# Patient Record
Sex: MALE | Race: WHITE | NOT HISPANIC OR LATINO | Employment: FULL TIME | URBAN - METROPOLITAN AREA
[De-identification: names, ages, dates, MRNs, and addresses within clinical notes are randomized per-mention and may not be internally consistent; named-entity substitution may affect disease eponyms.]

---

## 2017-11-13 ENCOUNTER — OFFICE VISIT (OUTPATIENT)
Dept: FAMILY MEDICINE | Facility: CLINIC | Age: 32
End: 2017-11-13
Payer: COMMERCIAL

## 2017-11-13 VITALS
BODY MASS INDEX: 28.65 KG/M2 | DIASTOLIC BLOOD PRESSURE: 80 MMHG | HEIGHT: 72 IN | TEMPERATURE: 99 F | RESPIRATION RATE: 16 BRPM | HEART RATE: 80 BPM | SYSTOLIC BLOOD PRESSURE: 112 MMHG | WEIGHT: 211.5 LBS

## 2017-11-13 DIAGNOSIS — E78.2 MIXED HYPERLIPIDEMIA: ICD-10-CM

## 2017-11-13 PROCEDURE — 99395 PREV VISIT EST AGE 18-39: CPT | Mod: ,,, | Performed by: FAMILY MEDICINE

## 2017-11-13 NOTE — PROGRESS NOTES
Subjective:       Patient ID: Iván Amaral is a 32 y.o. male.    Chief Complaint: Follow-up (labs done at work triglycerides high)    Had wellness screening at work, found to have high triglycerides- 519.  Does eat a lot of sugar. No fx diabetes.    tlcp=972, ldl=92, hdl=27  Review of Systems   Constitutional: Negative for activity change, appetite change, chills, diaphoresis, fatigue, fever and unexpected weight change.   HENT: Negative for congestion, dental problem, drooling, ear discharge, ear pain, facial swelling, hearing loss, mouth sores, nosebleeds, postnasal drip, rhinorrhea, sinus pain, sinus pressure, sneezing, sore throat, tinnitus, trouble swallowing and voice change.    Eyes: Negative for photophobia, pain, discharge, redness, itching and visual disturbance.   Respiratory: Negative for apnea, cough, choking, chest tightness, shortness of breath, wheezing and stridor.    Cardiovascular: Negative for chest pain, palpitations and leg swelling.   Gastrointestinal: Negative for abdominal distention, abdominal pain, anal bleeding, blood in stool, constipation, diarrhea, nausea, rectal pain and vomiting.   Endocrine: Negative for cold intolerance, heat intolerance, polydipsia, polyphagia and polyuria.   Genitourinary: Negative for decreased urine volume, difficulty urinating, discharge, dysuria, enuresis, flank pain, frequency, genital sores, hematuria, penile pain, penile swelling, scrotal swelling, testicular pain and urgency.   Musculoskeletal: Negative for arthralgias, back pain, gait problem, joint swelling, myalgias, neck pain and neck stiffness.   Skin: Negative for color change, pallor, rash and wound.   Allergic/Immunologic: Negative for environmental allergies, food allergies and immunocompromised state.   Neurological: Positive for headaches (rare.). Negative for dizziness, tremors, seizures, syncope, facial asymmetry, speech difficulty, weakness, light-headedness and numbness.    Hematological: Negative for adenopathy. Does not bruise/bleed easily.   Psychiatric/Behavioral: Negative for agitation, behavioral problems, confusion, decreased concentration, dysphoric mood, hallucinations, self-injury, sleep disturbance and suicidal ideas. The patient is not nervous/anxious and is not hyperactive.        Objective:      Physical Exam   Constitutional: He appears well-developed and well-nourished.   HENT:   Head: Normocephalic and atraumatic.   Eyes: Conjunctivae and EOM are normal. Pupils are equal, round, and reactive to light.   Neck: Normal range of motion. Neck supple.   Cardiovascular: Normal rate, regular rhythm, normal heart sounds and intact distal pulses.    Abdominal: Soft. Bowel sounds are normal. He exhibits no distension and no mass. There is no tenderness. There is no rebound and no guarding. No hernia.       Assessment:       1. Mixed hyperlipidemia        Plan:       Iván was seen today for follow-up.    Diagnoses and all orders for this visit:    Mixed hyperlipidemia  -     Hepatic function panel; Future  -     Lipid panel; Future  -     Hemoglobin A1c; Future  -     Lipase; Future  -     Hepatic function panel  -     Lipid panel  -     Hemoglobin A1c  -     Lipase         Return in about 6 months (around 5/13/2018).

## 2017-11-14 ENCOUNTER — TELEPHONE (OUTPATIENT)
Dept: FAMILY MEDICINE | Facility: CLINIC | Age: 32
End: 2017-11-14

## 2017-11-15 ENCOUNTER — TELEPHONE (OUTPATIENT)
Dept: FAMILY MEDICINE | Facility: CLINIC | Age: 32
End: 2017-11-15

## 2017-11-15 LAB
ALBUMIN SERPL-MCNC: 4.5 G/DL (ref 3.5–5.5)
ALP SERPL-CCNC: 127 IU/L (ref 39–117)
ALT SERPL-CCNC: 47 IU/L (ref 0–32)
AST SERPL-CCNC: 24 IU/L (ref 0–40)
BILIRUB DIRECT SERPL-MCNC: 0.09 MG/DL (ref 0–0.4)
BILIRUB SERPL-MCNC: 0.4 MG/DL (ref 0–1.2)
CHOLEST SERPL-MCNC: 208 MG/DL (ref 100–199)
HBA1C MFR BLD: 5.1 % (ref 4.8–5.6)
HDLC SERPL-MCNC: 35 MG/DL
LDLC SERPL CALC-MCNC: 126 MG/DL (ref 0–99)
LIPASE SERPL-CCNC: 25 U/L (ref 14–72)
PROT SERPL-MCNC: 7.1 G/DL (ref 6–8.5)
TRIGL SERPL-MCNC: 234 MG/DL (ref 0–149)
VLDLC SERPL CALC-MCNC: 47 MG/DL (ref 5–40)

## 2017-11-15 NOTE — TELEPHONE ENCOUNTER
----- Message from Hugo Moran MD sent at 11/15/2017  8:49 AM CST -----  Please notify patient:Cholesterol is high at 208 with high LDL is well. Considering family history and risk factors would recommend starting Pravachol 10 mg per day and recheckin 3 months. If this is okay with you at me know and I will send in to your pharmacy.  ----- Message -----  From: Dorina Anderson  Sent: 11/15/2017   8:18 AM  To: [facility] Staff    Lab 11/14/17

## 2017-12-06 ENCOUNTER — TELEPHONE (OUTPATIENT)
Dept: FAMILY MEDICINE | Facility: CLINIC | Age: 32
End: 2017-12-06

## 2017-12-06 NOTE — TELEPHONE ENCOUNTER
----- Message from Hugo Moran MD sent at 12/6/2017  4:57 PM CST -----  High cholesterol and HDL. Low-cholesterol diet and recheck in 6 weeks

## 2017-12-07 ENCOUNTER — TELEPHONE (OUTPATIENT)
Dept: FAMILY MEDICINE | Facility: CLINIC | Age: 32
End: 2017-12-07

## 2017-12-07 NOTE — TELEPHONE ENCOUNTER
Patient called in describing some muscle aches that started after he started Pravachol 10 mg per day he described in the right upper arm but is also having mild pain in both legs. He was advised this could be mild myalgias and notatrial myopathyfrom the medicine but was advised to come Q10 twice a day for 1 week and then once a day thereafter and to notify me if it doesn't improve over 2 weeks.

## 2018-01-02 ENCOUNTER — OFFICE VISIT (OUTPATIENT)
Dept: FAMILY MEDICINE | Facility: CLINIC | Age: 33
End: 2018-01-02
Payer: COMMERCIAL

## 2018-01-02 VITALS
SYSTOLIC BLOOD PRESSURE: 112 MMHG | DIASTOLIC BLOOD PRESSURE: 80 MMHG | HEIGHT: 72 IN | WEIGHT: 213 LBS | BODY MASS INDEX: 28.85 KG/M2 | HEART RATE: 76 BPM | TEMPERATURE: 98 F

## 2018-01-02 DIAGNOSIS — R10.11 RIGHT UPPER QUADRANT ABDOMINAL PAIN: ICD-10-CM

## 2018-01-02 PROCEDURE — 99213 OFFICE O/P EST LOW 20 MIN: CPT | Mod: ,,, | Performed by: FAMILY MEDICINE

## 2018-01-02 RX ORDER — UBIDECARENONE 30 MG
30 CAPSULE ORAL 3 TIMES DAILY
COMMUNITY
End: 2018-01-25

## 2018-01-02 RX ORDER — NAPROXEN 500 MG/1
TABLET ORAL
COMMUNITY
Start: 2017-12-27 | End: 2018-01-25

## 2018-01-02 RX ORDER — ONDANSETRON 4 MG/1
TABLET, FILM COATED ORAL
COMMUNITY
Start: 2017-12-11 | End: 2018-01-02

## 2018-01-02 RX ORDER — PRAVASTATIN SODIUM 10 MG/1
TABLET ORAL
COMMUNITY
Start: 2017-12-18 | End: 2018-01-02

## 2018-01-02 RX ORDER — MULTIVITAMIN
1 TABLET ORAL DAILY
COMMUNITY

## 2018-01-02 RX ORDER — OMEPRAZOLE 40 MG/1
40 CAPSULE, DELAYED RELEASE ORAL DAILY
Qty: 90 CAPSULE | Refills: 3 | Status: SHIPPED | OUTPATIENT
Start: 2018-01-02 | End: 2018-03-02 | Stop reason: SDUPTHER

## 2018-01-02 RX ORDER — HYDROCODONE BITARTRATE AND ACETAMINOPHEN 5; 325 MG/1; MG/1
1 TABLET ORAL EVERY 6 HOURS PRN
Qty: 40 TABLET | Refills: 0 | Status: SHIPPED | OUTPATIENT
Start: 2018-01-02 | End: 2018-01-16

## 2018-01-02 NOTE — PROGRESS NOTES
Subjective:       Patient ID: Iván Amaral is a 32 y.o. male.    Chief Complaint: Abdominal Pain (RUQ ) and Nausea (VOMITING OCCASIONALLY)      Suspected gallstones: Abdominal pain bloating and nausea after eating. Especially fatty foods , pizza .  Seen at Urgent care for right chest pain, given Naproxen. Has pain into the right scapula when he and shoulder  Wt Readings from Last 3 Encounters:  01/02/18 : 96.6 kg (213 lb)  11/13/17 : 95.9 kg (211 lb 8 oz)    Temp Readings from Last 3 Encounters:  01/02/18 : 97.7 °F (36.5 °C) (Tympanic)  11/13/17 : 98.5 °F (36.9 °C) (Tympanic)    BP Readings from Last 3 Encounters:  01/02/18 : 112/80  11/13/17 : 112/80    Pulse Readings from Last 3 Encounters:  01/02/18 : 76  11/13/17 : 80        Abdominal Pain   This is a new problem. The current episode started 1 to 4 weeks ago. The onset quality is gradual. The problem occurs intermittently. The problem has been gradually worsening. The pain is located in the RUQ and epigastric region. The pain is at a severity of 5/10. The pain is moderate. The quality of the pain is aching and dull. Associated symptoms include belching and diarrhea. Pertinent negatives include no hematochezia, hematuria or melena. The pain is aggravated by eating ( gaviscon). He has tried antacids for the symptoms.       Allergies and Medications:   Review of patient's allergies indicates:   Allergen Reactions    Pcn [penicillins]      Current Outpatient Prescriptions   Medication Sig Dispense Refill    co-enzyme Q-10 30 mg capsule Take 30 mg by mouth 3 (three) times daily.      multivitamin (ONE DAILY MULTIVITAMIN) per tablet Take 1 tablet by mouth once daily.      naproxen (NAPROSYN) 500 MG tablet       omeprazole (PRILOSEC) 40 MG capsule Take 1 capsule (40 mg total) by mouth once daily. 90 capsule 3     No current facility-administered medications for this visit.        Family History:   Family History   Problem Relation Age of Onset     Diabetes Mother     Hypothyroidism Mother     Diabetes Father     Gout Father     Heart failure Father     Heart disease Father     Hypoglycemic Brother     Cancer Maternal Grandfather        Social History:   Social History     Social History    Marital status: Single     Spouse name: N/A    Number of children: N/A    Years of education: N/A     Occupational History    Not on file.     Social History Main Topics    Smoking status: Never Smoker    Smokeless tobacco: Never Used    Alcohol use No    Drug use: No    Sexual activity: Not on file     Other Topics Concern    Not on file     Social History Narrative    No narrative on file       Review of Systems   Gastrointestinal: Positive for diarrhea. Negative for hematochezia and melena.   Genitourinary: Negative for hematuria.       Objective:     Vitals:    01/02/18 0924   BP: 112/80   Pulse: 76   Temp: 97.7 °F (36.5 °C)        Physical Exam   Pulmonary/Chest: Effort normal and breath sounds normal. No respiratory distress. He has no wheezes. He has no rales. He exhibits no tenderness.   Abdominal: Soft. Bowel sounds are normal. He exhibits distension. There is tenderness (tenderness right upper quadrant with posi).       Assessment:       1. Right upper quadrant abdominal pain        Plan:       Iván was seen today for abdominal pain and nausea.    Diagnoses and all orders for this visit:    Right upper quadrant abdominal pain  -     CBC auto differential; Future  -     Comprehensive metabolic panel; Future  -     Lipase; Future  -     US Abdomen Complete; Future  -     CBC auto differential  -     Comprehensive metabolic panel  -     Lipase    Other orders  -     omeprazole (PRILOSEC) 40 MG capsule; Take 1 capsule (40 mg total) by mouth once daily.         Return if symptoms worsen or fail to improve.

## 2018-01-03 ENCOUNTER — TELEPHONE (OUTPATIENT)
Dept: FAMILY MEDICINE | Facility: CLINIC | Age: 33
End: 2018-01-03

## 2018-01-03 LAB
ALBUMIN SERPL-MCNC: 4.5 G/DL (ref 3.5–5.5)
ALBUMIN/GLOB SERPL: 1.4 {RATIO} (ref 1.2–2.2)
ALP SERPL-CCNC: 135 IU/L (ref 39–117)
ALT SERPL-CCNC: 43 IU/L (ref 0–44)
AMBIG ABBREV CMP 14 DEFAULT: NORMAL
AST SERPL-CCNC: 21 IU/L (ref 0–40)
BASOPHILS # BLD AUTO: 0 X10E3/UL (ref 0–0.2)
BASOPHILS NFR BLD AUTO: 1 %
BILIRUB SERPL-MCNC: 0.3 MG/DL (ref 0–1.2)
BUN SERPL-MCNC: 16 MG/DL (ref 6–20)
BUN/CREAT SERPL: 15 (ref 9–20)
CALCIUM SERPL-MCNC: 10 MG/DL (ref 8.7–10.2)
CHLORIDE SERPL-SCNC: 100 MMOL/L (ref 96–106)
CO2 SERPL-SCNC: 24 MMOL/L (ref 18–29)
CREAT SERPL-MCNC: 1.06 MG/DL (ref 0.76–1.27)
EGFR IF AFRICAN AMERICAN: 106 ML/MIN/1.73
EOSINOPHIL # BLD AUTO: 0.4 X10E3/UL (ref 0–0.4)
EOSINOPHIL NFR BLD AUTO: 6 %
ERYTHROCYTE [DISTWIDTH] IN BLOOD BY AUTOMATED COUNT: 13.4 % (ref 12.3–15.4)
EST. GFR  (NON AFRICAN AMERICAN): 92 ML/MIN/1.73
GLOBULIN SER CALC-MCNC: 3.2 G/DL (ref 1.5–4.5)
GLUCOSE SERPL-MCNC: 105 MG/DL (ref 65–99)
HCT VFR BLD AUTO: 46.9 % (ref 37.5–51)
HGB BLD-MCNC: 16.6 G/DL (ref 13–17.7)
IMM GRANULOCYTES # BLD: 0 X10E3/UL (ref 0–0.1)
IMM GRANULOCYTES NFR BLD: 0 %
LIPASE SERPL-CCNC: 27 U/L (ref 13–78)
LYMPHOCYTES # BLD AUTO: 2.4 X10E3/UL (ref 0.7–3.1)
LYMPHOCYTES NFR BLD AUTO: 36 %
MCH RBC QN AUTO: 30.7 PG (ref 26.6–33)
MCHC RBC AUTO-ENTMCNC: 35.4 G/DL (ref 31.5–35.7)
MCV RBC AUTO: 87 FL (ref 79–97)
MONOCYTES # BLD AUTO: 0.5 X10E3/UL (ref 0.1–0.9)
MONOCYTES NFR BLD AUTO: 8 %
NEUTROPHILS # BLD AUTO: 3.4 X10E3/UL (ref 1.4–7)
NEUTROPHILS NFR BLD AUTO: 49 %
PLATELET # BLD AUTO: 368 X10E3/UL (ref 150–379)
POTASSIUM SERPL-SCNC: 4.8 MMOL/L (ref 3.5–5.2)
PROT SERPL-MCNC: 7.7 G/DL (ref 6–8.5)
RBC # BLD AUTO: 5.41 X10E6/UL (ref 4.14–5.8)
SODIUM SERPL-SCNC: 141 MMOL/L (ref 134–144)
WBC # BLD AUTO: 6.7 X10E3/UL (ref 3.4–10.8)

## 2018-01-03 NOTE — TELEPHONE ENCOUNTER
Returns patient call left a message on his antrum stain with extensive instructions. Also advised him he has no evidence of gallstones.

## 2018-01-03 NOTE — TELEPHONE ENCOUNTER
Advised patient of normal results of ultrasound Dr. Moran awaiting lab results continue Omeprazole

## 2018-01-03 NOTE — TELEPHONE ENCOUNTER
----- Message from Hugo Moran MD sent at 1/2/2018 12:42 PM CST -----  No evidence of gallstones. Probable duodenal this versus duodenal ulcer. Will await lab work and treat with the acid blocker as previously prescribed.

## 2018-01-03 NOTE — TELEPHONE ENCOUNTER
----- Message from Francesca Gallagher sent at 1/3/2018  3:10 PM CST -----  NEEDS TO KNOW IF HIS RESULTS TO HIS ULTRA SOUND CAME IN YET,PLEASE CALL

## 2018-01-11 ENCOUNTER — TELEPHONE (OUTPATIENT)
Dept: FAMILY MEDICINE | Facility: CLINIC | Age: 33
End: 2018-01-11

## 2018-01-15 ENCOUNTER — TELEPHONE (OUTPATIENT)
Dept: FAMILY MEDICINE | Facility: CLINIC | Age: 33
End: 2018-01-15

## 2018-01-15 NOTE — TELEPHONE ENCOUNTER
Patient called in regards to Pravastatin. You had stopped medication at his last visit pending lab results.  Should patient restart the Pravastatin.  His stomach issues are improving with Omeprazole but he is still having chest pressure Please advise .  [phone]

## 2018-01-15 NOTE — TELEPHONE ENCOUNTER
Patient notified to restart cholesterol medication and to rtc if the chest pressure continues RTC or to ER if shortness of breath

## 2018-01-24 ENCOUNTER — TELEPHONE (OUTPATIENT)
Dept: FAMILY MEDICINE | Facility: CLINIC | Age: 33
End: 2018-01-24

## 2018-01-24 NOTE — TELEPHONE ENCOUNTER
Patient had an appointment for Friday that had to be rescheduled series having chest pain because he has a father with congestive heart failure he's concerned about a cardiac problem is 32 years old and has no cardiac history personally does have a history of ulcers with reflux he is taking simethicone without relief in his omeprazole on a daily basis. We will try given appointment for an EKG and evaluation.

## 2018-01-24 NOTE — TELEPHONE ENCOUNTER
----- Message from Olivia Worthington sent at 2018  9:58 AM CST -----  Contact: We moved pt's appt from Friday due to  to tomorrow, but....see below  Pt asking to speak to Dr Moran today, says he's having pressure in his chest.

## 2018-01-25 ENCOUNTER — OFFICE VISIT (OUTPATIENT)
Dept: FAMILY MEDICINE | Facility: CLINIC | Age: 33
End: 2018-01-25
Payer: COMMERCIAL

## 2018-01-25 VITALS
HEART RATE: 76 BPM | TEMPERATURE: 98 F | SYSTOLIC BLOOD PRESSURE: 112 MMHG | RESPIRATION RATE: 16 BRPM | WEIGHT: 215 LBS | DIASTOLIC BLOOD PRESSURE: 70 MMHG | BODY MASS INDEX: 29.12 KG/M2 | HEIGHT: 72 IN

## 2018-01-25 DIAGNOSIS — R07.9 CHEST PAIN, UNSPECIFIED TYPE: Primary | ICD-10-CM

## 2018-01-25 DIAGNOSIS — R07.9 CHEST PAIN AT REST: ICD-10-CM

## 2018-01-25 DIAGNOSIS — F41.9 ANXIETY: ICD-10-CM

## 2018-01-25 PROCEDURE — 93000 ELECTROCARDIOGRAM COMPLETE: CPT | Mod: ,,, | Performed by: FAMILY MEDICINE

## 2018-01-25 PROCEDURE — 99214 OFFICE O/P EST MOD 30 MIN: CPT | Mod: 25,,, | Performed by: FAMILY MEDICINE

## 2018-01-25 NOTE — PATIENT INSTRUCTIONS
Rebreathing for anxiety attack:  Keep a #12 white paper bag in desk bedside, and glove compartment. During an attack hold the bag up to mouth and nose and rebreathe slowly until attack breaks.

## 2018-03-02 RX ORDER — OMEPRAZOLE 40 MG/1
40 CAPSULE, DELAYED RELEASE ORAL DAILY
Qty: 90 CAPSULE | Refills: 3 | Status: SHIPPED | OUTPATIENT
Start: 2018-03-02 | End: 2018-04-04 | Stop reason: SDUPTHER

## 2018-03-02 RX ORDER — PRAVASTATIN SODIUM 10 MG/1
10 TABLET ORAL DAILY
Qty: 90 TABLET | Refills: 3 | Status: SHIPPED | OUTPATIENT
Start: 2018-03-02 | End: 2018-04-04 | Stop reason: SDUPTHER

## 2018-04-04 RX ORDER — OMEPRAZOLE 40 MG/1
40 CAPSULE, DELAYED RELEASE ORAL DAILY
Qty: 90 CAPSULE | Refills: 3 | Status: SHIPPED | OUTPATIENT
Start: 2018-04-04 | End: 2018-05-14 | Stop reason: ALTCHOICE

## 2018-04-04 RX ORDER — PRAVASTATIN SODIUM 10 MG/1
10 TABLET ORAL DAILY
Qty: 90 TABLET | Refills: 3 | Status: SHIPPED | OUTPATIENT
Start: 2018-04-04 | End: 2019-10-23 | Stop reason: SDUPTHER

## 2018-05-14 ENCOUNTER — OFFICE VISIT (OUTPATIENT)
Dept: FAMILY MEDICINE | Facility: CLINIC | Age: 33
End: 2018-05-14
Payer: COMMERCIAL

## 2018-05-14 VITALS
OXYGEN SATURATION: 99 % | DIASTOLIC BLOOD PRESSURE: 82 MMHG | HEART RATE: 78 BPM | WEIGHT: 219 LBS | HEIGHT: 72 IN | RESPIRATION RATE: 16 BRPM | TEMPERATURE: 97 F | BODY MASS INDEX: 29.66 KG/M2 | SYSTOLIC BLOOD PRESSURE: 118 MMHG

## 2018-05-14 DIAGNOSIS — R10.12 LEFT UPPER QUADRANT PAIN: ICD-10-CM

## 2018-05-14 DIAGNOSIS — K25.9 GASTRIC ULCER, UNSPECIFIED CHRONICITY, UNSPECIFIED WHETHER GASTRIC ULCER HEMORRHAGE OR PERFORATION PRESENT: ICD-10-CM

## 2018-05-14 PROCEDURE — 99213 OFFICE O/P EST LOW 20 MIN: CPT | Mod: ,,, | Performed by: FAMILY MEDICINE

## 2018-05-14 PROCEDURE — 3008F BODY MASS INDEX DOCD: CPT | Mod: ,,, | Performed by: FAMILY MEDICINE

## 2018-05-14 RX ORDER — ESOMEPRAZOLE MAGNESIUM 40 MG/1
40 CAPSULE, DELAYED RELEASE ORAL
Qty: 30 CAPSULE | Refills: 6 | Status: SHIPPED | OUTPATIENT
Start: 2018-05-14 | End: 2020-12-07

## 2018-05-14 NOTE — PROGRESS NOTES
Subjective:       Patient ID: Iván Amaral is a 33 y.o. male.    Chief Complaint: Follow-up (with ulcer) and Abdominal Pain      Abdominal Pain   This is a new problem. Episode onset: 6 months. The onset quality is gradual. The problem has been unchanged. The pain is located in the LUQ. The pain is at a severity of 6/10. The pain is moderate. The quality of the pain is dull and aching. The abdominal pain does not radiate. Pertinent negatives include no anorexia, arthralgias, constipation, hematuria, melena, nausea, vomiting or weight loss. Nothing aggravates the pain. The pain is relieved by nothing. He has tried antacids and proton pump inhibitors for the symptoms. Prior workup: None yet. His past medical history is significant for PUD (6 months).       Allergies and Medications:   Review of patient's allergies indicates:   Allergen Reactions    Pcn [penicillins]      Current Outpatient Prescriptions   Medication Sig Dispense Refill    multivitamin (ONE DAILY MULTIVITAMIN) per tablet Take 1 tablet by mouth once daily.      pravastatin (PRAVACHOL) 10 MG tablet Take 1 tablet (10 mg total) by mouth once daily. 90 tablet 3    esomeprazole (NEXIUM) 40 MG capsule Take 1 capsule (40 mg total) by mouth before breakfast. Patient failed a six-month course of omeprazole 30 capsule 6     No current facility-administered medications for this visit.        Family History:   Family History   Problem Relation Age of Onset    Diabetes Mother     Hypothyroidism Mother     Diabetes Father     Gout Father     Heart failure Father     Heart disease Father     Hypoglycemic Brother     Cancer Maternal Grandfather        Social History:   Social History     Social History    Marital status: Single     Spouse name: N/A    Number of children: N/A    Years of education: N/A     Occupational History    Not on file.     Social History Main Topics    Smoking status: Never Smoker    Smokeless tobacco: Never Used     Alcohol use No    Drug use: No    Sexual activity: Not on file     Other Topics Concern    Not on file     Social History Narrative    No narrative on file       Review of Systems   Constitutional: Negative for weight loss.   Gastrointestinal: Positive for abdominal pain. Negative for anorexia, constipation, melena, nausea and vomiting.   Genitourinary: Negative for hematuria.   Musculoskeletal: Negative for arthralgias.       Objective:     Vitals:    05/14/18 0946   BP: 118/82   Pulse: 78   Resp: 16   Temp: 97.3 °F (36.3 °C)        Physical Exam   Abdominal: He exhibits no shifting dullness, no distension, no pulsatile liver, no fluid wave, no abdominal bruit, no ascites, no pulsatile midline mass and no mass. There is no hepatosplenomegaly, splenomegaly or hepatomegaly. There is tenderness in the epigastric area and left upper quadrant. There is no rebound, no guarding, no CVA tenderness, no tenderness at McBurney's point and negative Rankin's sign. No hernia. Hernia confirmed negative in the ventral area, confirmed negative in the right inguinal area and confirmed negative in the left inguinal area.           Assessment:       1. Left upper quadrant pain    2. Gastric ulcer, unspecified chronicity, unspecified whether gastric ulcer hemorrhage or perforation present        Plan:       Iván was seen today for follow-up and abdominal pain.    Diagnoses and all orders for this visit:    Left upper quadrant pain  -     esomeprazole (NEXIUM) 40 MG capsule; Take 1 capsule (40 mg total) by mouth before breakfast. Patient failed a six-month course of omeprazole  -     CBC auto differential; Future  -     Hepatic function panel; Future  -     Lipase; Future  -     Ambulatory referral to Gastroenterology  -     CBC auto differential  -     Hepatic function panel  -     Lipase    Gastric ulcer, unspecified chronicity, unspecified whether gastric ulcer hemorrhage or perforation present         Follow-up if symptoms  worsen or fail to improve.

## 2018-05-15 LAB
ALBUMIN SERPL-MCNC: 4.5 G/DL (ref 3.5–5.5)
ALP SERPL-CCNC: 186 IU/L (ref 39–117)
ALT SERPL-CCNC: 45 IU/L (ref 0–44)
AMBIG ABBREV LP DEFAULT: NORMAL
AST SERPL-CCNC: 21 IU/L (ref 0–40)
BASOPHILS # BLD AUTO: 0 X10E3/UL (ref 0–0.2)
BASOPHILS NFR BLD AUTO: 0 %
BILIRUB DIRECT SERPL-MCNC: 0.06 MG/DL (ref 0–0.4)
BILIRUB SERPL-MCNC: <0.2 MG/DL (ref 0–1.2)
EOSINOPHIL # BLD AUTO: 0.4 X10E3/UL (ref 0–0.4)
EOSINOPHIL NFR BLD AUTO: 4 %
ERYTHROCYTE [DISTWIDTH] IN BLOOD BY AUTOMATED COUNT: 13.8 % (ref 12.3–15.4)
HCT VFR BLD AUTO: 45.4 % (ref 37.5–51)
HGB BLD-MCNC: 15.2 G/DL (ref 13–17.7)
IMM GRANULOCYTES # BLD: 0 X10E3/UL (ref 0–0.1)
IMM GRANULOCYTES NFR BLD: 0 %
LIPASE SERPL-CCNC: 23 U/L (ref 13–78)
LYMPHOCYTES # BLD AUTO: 3 X10E3/UL (ref 0.7–3.1)
LYMPHOCYTES NFR BLD AUTO: 37 %
MCH RBC QN AUTO: 30.1 PG (ref 26.6–33)
MCHC RBC AUTO-ENTMCNC: 33.5 G/DL (ref 31.5–35.7)
MCV RBC AUTO: 90 FL (ref 79–97)
MONOCYTES # BLD AUTO: 0.7 X10E3/UL (ref 0.1–0.9)
MONOCYTES NFR BLD AUTO: 8 %
NEUTROPHILS # BLD AUTO: 4.1 X10E3/UL (ref 1.4–7)
NEUTROPHILS NFR BLD AUTO: 51 %
PROT SERPL-MCNC: 7.2 G/DL (ref 6–8.5)
RBC # BLD AUTO: 5.05 X10E6/UL (ref 4.14–5.8)
WBC # BLD AUTO: 8.2 X10E3/UL (ref 3.4–10.8)

## 2018-05-17 ENCOUNTER — TELEPHONE (OUTPATIENT)
Dept: FAMILY MEDICINE | Facility: CLINIC | Age: 33
End: 2018-05-17

## 2018-05-17 NOTE — TELEPHONE ENCOUNTER
----- Message from Hugo Moran MD sent at 5/16/2018  4:39 PM CDT -----  Results Ok, notify patient.

## 2018-07-26 ENCOUNTER — TELEPHONE (OUTPATIENT)
Dept: FAMILY MEDICINE | Facility: CLINIC | Age: 33
End: 2018-07-26

## 2018-07-26 NOTE — TELEPHONE ENCOUNTER
Patient called in regards to medication for an ulcer he wanted to speak with you about this Please call him

## 2019-01-14 NOTE — PROGRESS NOTES
Subjective:       Patient ID: Iván Amaral is a 32 y.o. male.    Chief Complaint: Bloated and Chest Pain      Has a long history of gastroesophageal reflux and gastritis but has had chest pains develop which has persisted over the last week or so.      Chest Pain    This is a new problem. The current episode started in the past 7 days. The quality of the pain is described as pressure and squeezing. The pain does not radiate. Associated symptoms include orthopnea and shortness of breath. Pertinent negatives include no cough, diaphoresis, dizziness, exertional chest pressure, nausea, near-syncope, palpitations or vomiting. The pain is aggravated by nothing. He has tried antacids for the symptoms. The treatment provided no relief.   His past medical history is significant for anxiety/panic attacks. Past medical history comments: GERD       Allergies and Medications:   Review of patient's allergies indicates:   Allergen Reactions    Pcn [penicillins]      Current Outpatient Prescriptions   Medication Sig Dispense Refill    multivitamin (ONE DAILY MULTIVITAMIN) per tablet Take 1 tablet by mouth once daily.      omeprazole (PRILOSEC) 40 MG capsule Take 1 capsule (40 mg total) by mouth once daily. 90 capsule 3     No current facility-administered medications for this visit.        Family History:   Family History   Problem Relation Age of Onset    Diabetes Mother     Hypothyroidism Mother     Diabetes Father     Gout Father     Heart failure Father     Heart disease Father     Hypoglycemic Brother     Cancer Maternal Grandfather        Social History:   Social History     Social History    Marital status: Single     Spouse name: N/A    Number of children: N/A    Years of education: N/A     Occupational History    Not on file.     Social History Main Topics    Smoking status: Never Smoker    Smokeless tobacco: Never Used    Alcohol use No    Drug use: No    Sexual activity: Not on file     Other  Topics Concern    Not on file     Social History Narrative    No narrative on file       Review of Systems   Constitutional: Negative for diaphoresis.   Respiratory: Positive for chest tightness and shortness of breath. Negative for apnea, cough, choking, wheezing and stridor.    Cardiovascular: Positive for chest pain and orthopnea. Negative for palpitations, leg swelling and near-syncope.   Gastrointestinal: Negative for nausea and vomiting.   Neurological: Negative for dizziness.       Objective:     Vitals:    01/25/18 1001   BP: 112/70   Pulse: 76   Resp: 16   Temp: 97.8 °F (36.6 °C)        Physical Exam   Cardiovascular: Normal rate, regular rhythm, normal heart sounds and intact distal pulses.  Exam reveals no gallop and no friction rub.    No murmur heard.  Pulmonary/Chest: Effort normal and breath sounds normal. No respiratory distress. He has no wheezes. He has no rales. He exhibits no tenderness.     EKG: normal EKG, normal sinus rhythm, unchanged from previous tracings, normal sinus rhythm.    Assessment:       1. Chest pain, unspecified type    2. Anxiety    3. Chest pain at rest        Plan:       Iván was seen today for bloated and chest pain.    Diagnoses and all orders for this visit:    Chest pain, unspecified type  -     EKG 12-lead    Anxiety    Chest pain at rest         Follow-up if symptoms worsen or fail to improve.   95

## 2019-04-03 ENCOUNTER — TELEPHONE (OUTPATIENT)
Dept: FAMILY MEDICINE | Facility: CLINIC | Age: 34
End: 2019-04-03

## 2019-04-17 ENCOUNTER — OFFICE VISIT (OUTPATIENT)
Dept: FAMILY MEDICINE | Facility: CLINIC | Age: 34
End: 2019-04-17
Payer: COMMERCIAL

## 2019-04-17 VITALS
HEIGHT: 72 IN | WEIGHT: 205.13 LBS | OXYGEN SATURATION: 99 % | HEART RATE: 89 BPM | BODY MASS INDEX: 27.79 KG/M2 | SYSTOLIC BLOOD PRESSURE: 128 MMHG | DIASTOLIC BLOOD PRESSURE: 88 MMHG | TEMPERATURE: 98 F

## 2019-04-17 DIAGNOSIS — F41.8 DEPRESSION WITH ANXIETY: ICD-10-CM

## 2019-04-17 DIAGNOSIS — E78.2 MIXED HYPERLIPIDEMIA: Primary | ICD-10-CM

## 2019-04-17 PROCEDURE — 99213 OFFICE O/P EST LOW 20 MIN: CPT | Mod: ,,, | Performed by: FAMILY MEDICINE

## 2019-04-17 PROCEDURE — 99213 PR OFFICE/OUTPT VISIT, EST, LEVL III, 20-29 MIN: ICD-10-PCS | Mod: ,,, | Performed by: FAMILY MEDICINE

## 2019-04-17 PROCEDURE — 3008F BODY MASS INDEX DOCD: CPT | Mod: ,,, | Performed by: FAMILY MEDICINE

## 2019-04-17 PROCEDURE — 3008F PR BODY MASS INDEX (BMI) DOCUMENTED: ICD-10-PCS | Mod: ,,, | Performed by: FAMILY MEDICINE

## 2019-04-17 RX ORDER — CITALOPRAM 20 MG/1
20 TABLET, FILM COATED ORAL DAILY
Qty: 30 TABLET | Refills: 11 | Status: SHIPPED | OUTPATIENT
Start: 2019-04-17 | End: 2019-07-09 | Stop reason: SDUPTHER

## 2019-04-17 NOTE — PROGRESS NOTES
Subjective:       Patient ID: Iván Amaral is a 34 y.o. male.    Chief Complaint: Anxiety (needs to discuss meds) and Hyperlipidemia      Patient comes in today for treatment of anxiety disorder. he was seen and has been seeing a clinical  and has been diagnosed with generalized anxiety disorder. He has rare panic attacks and does not have avoidance disorder. Has had some sleep problems.  Wt Readings from Last 3 Encounters:  04/17/19 : 93 kg (205 lb 1.6 oz)  05/14/18 : 99.3 kg (219 lb)  01/25/18 : 97.5 kg (215 lb) weight has been up and down since last year.        Anxiety   Presents for initial visit. Onset was 1 to 5 years ago. The problem has been gradually worsening. Symptoms include decreased concentration, depressed mood, excessive worry and nervous/anxious behavior. The symptoms are aggravated by family issues and social activities. The quality of sleep is poor. Nighttime awakenings: several.     His past medical history is significant for anxiety/panic attacks and depression. Past treatments include nothing. The treatment provided no relief.       Allergies and Medications:   Review of patient's allergies indicates:   Allergen Reactions    Pcn [penicillins]      Current Outpatient Medications   Medication Sig Dispense Refill    esomeprazole (NEXIUM) 40 MG capsule Take 1 capsule (40 mg total) by mouth before breakfast. Patient failed a six-month course of omeprazole 30 capsule 6    multivitamin (ONE DAILY MULTIVITAMIN) per tablet Take 1 tablet by mouth once daily.      pravastatin (PRAVACHOL) 10 MG tablet Take 1 tablet (10 mg total) by mouth once daily. 90 tablet 3    citalopram (CELEXA) 20 MG tablet Take 1 tablet (20 mg total) by mouth once daily. 30 tablet 11     No current facility-administered medications for this visit.        Family History:   Family History   Problem Relation Age of Onset    Diabetes Mother     Hypothyroidism Mother     Diabetes Father     Gout Father      Heart failure Father     Heart disease Father     Hypoglycemic Brother     Cancer Maternal Grandfather        Social History:   Social History     Socioeconomic History    Marital status: Single     Spouse name: Not on file    Number of children: Not on file    Years of education: Not on file    Highest education level: Not on file   Occupational History    Not on file   Social Needs    Financial resource strain: Not on file    Food insecurity:     Worry: Not on file     Inability: Not on file    Transportation needs:     Medical: Not on file     Non-medical: Not on file   Tobacco Use    Smoking status: Never Smoker    Smokeless tobacco: Never Used   Substance and Sexual Activity    Alcohol use: No    Drug use: No    Sexual activity: Not Currently   Lifestyle    Physical activity:     Days per week: Not on file     Minutes per session: Not on file    Stress: Not on file   Relationships    Social connections:     Talks on phone: Not on file     Gets together: Not on file     Attends [employer] service: Not on file     Active member of club or organization: Not on file     Attends meetings of clubs or organizations: Not on file     Relationship status: Not on file   Other Topics Concern    Not on file   Social History Narrative    Not on file       Review of Systems   Psychiatric/Behavioral: Positive for decreased concentration. The patient is nervous/anxious.        Objective:     Vitals:    04/17/19 0946   BP: 128/88   Pulse: 89   Temp: 97.9 °F (36.6 °C)        Physical Exam   Constitutional: He appears well-developed and well-nourished.   HENT:   Head: Normocephalic.   Eyes: Pupils are equal, round, and reactive to light. Conjunctivae and EOM are normal.   Neck: Normal range of motion. Neck supple. No thyromegaly present.   Cardiovascular: Normal rate, regular rhythm, normal heart sounds and intact distal pulses. Exam reveals no friction rub.   No murmur heard.  Nursing note and vitals  reviewed.      Assessment:       1. Mixed hyperlipidemia    2. Depression with anxiety        Plan:       Iván was seen today for anxiety and hyperlipidemia.    Diagnoses and all orders for this visit:    Mixed hyperlipidemia  -     Lipid panel; Future  -     Lipid panel    Depression with anxiety  -     Comprehensive metabolic panel; Future  -     TSH; Future  -     citalopram (CELEXA) 20 MG tablet; Take 1 tablet (20 mg total) by mouth once daily.  -     Comprehensive metabolic panel  -     TSH         Follow up in about 1 month (around 5/17/2019) for follow up, depression anxiety.

## 2019-04-18 LAB
ALBUMIN SERPL-MCNC: 5 G/DL (ref 3.5–5.5)
ALBUMIN/GLOB SERPL: 1.9 {RATIO} (ref 1.2–2.2)
ALP SERPL-CCNC: 115 IU/L (ref 39–117)
ALT SERPL-CCNC: 25 IU/L (ref 0–44)
AST SERPL-CCNC: 22 IU/L (ref 0–40)
BILIRUB SERPL-MCNC: 0.4 MG/DL (ref 0–1.2)
BUN SERPL-MCNC: 11 MG/DL (ref 6–20)
BUN/CREAT SERPL: 9 (ref 9–20)
CALCIUM SERPL-MCNC: 9.7 MG/DL (ref 8.7–10.2)
CHLORIDE SERPL-SCNC: 102 MMOL/L (ref 96–106)
CHOLEST SERPL-MCNC: 184 MG/DL (ref 100–199)
CO2 SERPL-SCNC: 25 MMOL/L (ref 20–29)
CREAT SERPL-MCNC: 1.16 MG/DL (ref 0.76–1.27)
GLOBULIN SER CALC-MCNC: 2.6 G/DL (ref 1.5–4.5)
GLUCOSE SERPL-MCNC: 96 MG/DL (ref 65–99)
HDLC SERPL-MCNC: 32 MG/DL
LDLC SERPL CALC-MCNC: 106 MG/DL (ref 0–99)
POTASSIUM SERPL-SCNC: 4.4 MMOL/L (ref 3.5–5.2)
PROT SERPL-MCNC: 7.6 G/DL (ref 6–8.5)
SODIUM SERPL-SCNC: 141 MMOL/L (ref 134–144)
TRIGL SERPL-MCNC: 230 MG/DL (ref 0–149)
TSH SERPL DL<=0.005 MIU/L-ACNC: 3.6 UIU/ML (ref 0.45–4.5)
VLDLC SERPL CALC-MCNC: 46 MG/DL (ref 5–40)

## 2019-05-06 ENCOUNTER — TELEPHONE (OUTPATIENT)
Dept: FAMILY MEDICINE | Facility: CLINIC | Age: 34
End: 2019-05-06

## 2019-05-06 NOTE — TELEPHONE ENCOUNTER
Celexa is one of the cleanest SSRIs there is these side effects may be transient and we should wait at least a month before changes but I'm willing to decrease the dose to 10 mg or change to Lexapro 10 mg on May 17

## 2019-05-06 NOTE — TELEPHONE ENCOUNTER
Celexa 20 mg daily.    Helping with mental state but S/E are frustrating.    Sleepy and difficulty reaching orgasm.    Is there an alternate med??????

## 2019-05-16 ENCOUNTER — TELEPHONE (OUTPATIENT)
Dept: FAMILY MEDICINE | Facility: CLINIC | Age: 34
End: 2019-05-16

## 2019-05-16 DIAGNOSIS — F41.8 DEPRESSION WITH ANXIETY: ICD-10-CM

## 2019-05-16 NOTE — TELEPHONE ENCOUNTER
Pt called.  30 days since starting anxiety med.  S/E are more tolerable.  Helped with anxiety a little.      Celexa 20 mg.    Should he continue same or change dosage?

## 2019-07-09 DIAGNOSIS — F41.8 DEPRESSION WITH ANXIETY: ICD-10-CM

## 2019-07-09 RX ORDER — CITALOPRAM 20 MG/1
20 TABLET, FILM COATED ORAL DAILY
Qty: 90 TABLET | Refills: 1 | Status: SHIPPED | OUTPATIENT
Start: 2019-07-09 | End: 2019-07-11 | Stop reason: SDUPTHER

## 2019-07-11 DIAGNOSIS — F41.8 DEPRESSION WITH ANXIETY: ICD-10-CM

## 2019-07-11 RX ORDER — CITALOPRAM 20 MG/1
20 TABLET, FILM COATED ORAL DAILY
Qty: 90 TABLET | Refills: 1 | Status: SHIPPED | OUTPATIENT
Start: 2019-07-11 | End: 2020-01-07

## 2019-07-19 ENCOUNTER — OFFICE VISIT (OUTPATIENT)
Dept: FAMILY MEDICINE | Facility: CLINIC | Age: 34
End: 2019-07-19
Payer: COMMERCIAL

## 2019-07-19 VITALS
RESPIRATION RATE: 16 BRPM | OXYGEN SATURATION: 97 % | WEIGHT: 204.81 LBS | SYSTOLIC BLOOD PRESSURE: 112 MMHG | TEMPERATURE: 98 F | HEIGHT: 72 IN | BODY MASS INDEX: 27.74 KG/M2 | HEART RATE: 72 BPM | DIASTOLIC BLOOD PRESSURE: 80 MMHG

## 2019-07-19 DIAGNOSIS — F41.8 DEPRESSION WITH ANXIETY: ICD-10-CM

## 2019-07-19 DIAGNOSIS — T56.0X1A ACUTE LEAD-INDUCED GOUT INVOLVING TOE OF LEFT FOOT, INITIAL ENCOUNTER: Primary | ICD-10-CM

## 2019-07-19 DIAGNOSIS — M10.172 ACUTE LEAD-INDUCED GOUT INVOLVING TOE OF LEFT FOOT, INITIAL ENCOUNTER: Primary | ICD-10-CM

## 2019-07-19 PROCEDURE — 3008F PR BODY MASS INDEX (BMI) DOCUMENTED: ICD-10-PCS | Mod: ,,, | Performed by: FAMILY MEDICINE

## 2019-07-19 PROCEDURE — 99213 OFFICE O/P EST LOW 20 MIN: CPT | Mod: ,,, | Performed by: FAMILY MEDICINE

## 2019-07-19 PROCEDURE — 99213 PR OFFICE/OUTPT VISIT, EST, LEVL III, 20-29 MIN: ICD-10-PCS | Mod: ,,, | Performed by: FAMILY MEDICINE

## 2019-07-19 PROCEDURE — 1111F PR DISCHARGE MEDS RECONCILED W/ CURRENT OUTPATIENT MED LIST: ICD-10-PCS | Mod: ,,, | Performed by: FAMILY MEDICINE

## 2019-07-19 PROCEDURE — 3008F BODY MASS INDEX DOCD: CPT | Mod: ,,, | Performed by: FAMILY MEDICINE

## 2019-07-19 PROCEDURE — 1111F DSCHRG MED/CURRENT MED MERGE: CPT | Mod: ,,, | Performed by: FAMILY MEDICINE

## 2019-07-19 RX ORDER — ALLOPURINOL 300 MG/1
300 TABLET ORAL DAILY
Qty: 90 TABLET | Refills: 3 | Status: SHIPPED | OUTPATIENT
Start: 2019-07-19 | End: 2019-10-09 | Stop reason: SDUPTHER

## 2019-07-19 RX ORDER — CITALOPRAM 40 MG/1
40 TABLET, FILM COATED ORAL DAILY
Qty: 90 TABLET | Refills: 3 | Status: SHIPPED | OUTPATIENT
Start: 2019-07-19 | End: 2019-10-23

## 2019-07-19 RX ORDER — COLCHICINE 0.6 MG/1
0.6 TABLET, FILM COATED ORAL
Refills: 0 | COMMUNITY
Start: 2019-07-11 | End: 2019-07-19 | Stop reason: ALTCHOICE

## 2019-07-19 RX ORDER — INDOMETHACIN 50 MG/1
50 CAPSULE ORAL DAILY
Refills: 0 | COMMUNITY
Start: 2019-07-11 | End: 2020-12-07

## 2019-10-09 DIAGNOSIS — T56.0X1A ACUTE LEAD-INDUCED GOUT INVOLVING TOE OF LEFT FOOT, INITIAL ENCOUNTER: ICD-10-CM

## 2019-10-09 DIAGNOSIS — M10.172 ACUTE LEAD-INDUCED GOUT INVOLVING TOE OF LEFT FOOT, INITIAL ENCOUNTER: ICD-10-CM

## 2019-10-09 RX ORDER — ALLOPURINOL 300 MG/1
300 TABLET ORAL DAILY
Qty: 90 TABLET | Refills: 1 | Status: SHIPPED | OUTPATIENT
Start: 2019-10-09 | End: 2020-10-21

## 2019-10-23 ENCOUNTER — OFFICE VISIT (OUTPATIENT)
Dept: FAMILY MEDICINE | Facility: CLINIC | Age: 34
End: 2019-10-23
Payer: COMMERCIAL

## 2019-10-23 VITALS
WEIGHT: 207.13 LBS | HEIGHT: 72 IN | HEART RATE: 68 BPM | DIASTOLIC BLOOD PRESSURE: 86 MMHG | OXYGEN SATURATION: 98 % | TEMPERATURE: 98 F | SYSTOLIC BLOOD PRESSURE: 128 MMHG | BODY MASS INDEX: 28.05 KG/M2

## 2019-10-23 DIAGNOSIS — E78.2 MIXED HYPERLIPIDEMIA: Primary | ICD-10-CM

## 2019-10-23 DIAGNOSIS — F41.8 DEPRESSION WITH ANXIETY: ICD-10-CM

## 2019-10-23 DIAGNOSIS — Z23 IMMUNIZATION DUE: ICD-10-CM

## 2019-10-23 PROCEDURE — 99213 PR OFFICE/OUTPT VISIT, EST, LEVL III, 20-29 MIN: ICD-10-PCS | Mod: S$GLB,,, | Performed by: FAMILY MEDICINE

## 2019-10-23 PROCEDURE — 3008F BODY MASS INDEX DOCD: CPT | Mod: S$GLB,,, | Performed by: FAMILY MEDICINE

## 2019-10-23 PROCEDURE — 3008F PR BODY MASS INDEX (BMI) DOCUMENTED: ICD-10-PCS | Mod: S$GLB,,, | Performed by: FAMILY MEDICINE

## 2019-10-23 PROCEDURE — 99213 OFFICE O/P EST LOW 20 MIN: CPT | Mod: S$GLB,,, | Performed by: FAMILY MEDICINE

## 2019-10-23 RX ORDER — CITALOPRAM 20 MG/1
20 TABLET, FILM COATED ORAL DAILY
Qty: 30 TABLET | Refills: 11 | Status: SHIPPED | OUTPATIENT
Start: 2019-10-23 | End: 2020-05-19 | Stop reason: DRUGHIGH

## 2019-10-23 RX ORDER — LEVOMEFOLATE CALCIUM 15 MG
1 TABLET ORAL DAILY
Qty: 30 TABLET | Refills: 11 | Status: SHIPPED | OUTPATIENT
Start: 2019-10-23 | End: 2020-12-07

## 2019-10-23 RX ORDER — PRAVASTATIN SODIUM 10 MG/1
10 TABLET ORAL DAILY
Qty: 90 TABLET | Refills: 3 | Status: SHIPPED | OUTPATIENT
Start: 2019-10-23 | End: 2020-02-11

## 2019-10-23 RX ORDER — PRAVASTATIN SODIUM 10 MG/1
10 TABLET ORAL DAILY
Qty: 90 TABLET | Refills: 1 | Status: SHIPPED | OUTPATIENT
Start: 2019-10-23 | End: 2019-10-23

## 2019-10-23 NOTE — PROGRESS NOTES
Subjective:       Patient ID: Iván Amaral is a 34 y.o. male.    Chief Complaint: Anxiety (3 month follow up medication) and Hyperlipidemia      Patient is here to follow-up on anxiety.  He did have lab work done through work total cholesterol was 199 .  Glucose was 99.  On Celexa and seems to be helping his anxiety issues.  He takes the Celexa at night but finds he is sleepy in the morning since he went up to 40 mg.      Anxiety   Presents for follow-up visit. Symptoms include nervous/anxious behavior ( decreased). Patient reports no chest pain, compulsions, confusion, decreased concentration, depressed mood, dizziness, dry mouth, excessive worry, palpitations, panic ( almost none), restlessness, shortness of breath or suicidal ideas. Symptoms occur rarely. The severity of symptoms is mild.       Hyperlipidemia   This is a chronic problem. The problem is controlled. Recent lipid tests were reviewed and are low. Pertinent negatives include no chest pain or shortness of breath.       Allergies and Medications:   Review of patient's allergies indicates:   Allergen Reactions    Pcn [penicillins]      Current Outpatient Medications   Medication Sig Dispense Refill    allopurinol (ZYLOPRIM) 300 MG tablet Take 1 tablet (300 mg total) by mouth once daily. 90 tablet 1    esomeprazole (NEXIUM) 40 MG capsule Take 1 capsule (40 mg total) by mouth before breakfast. Patient failed a six-month course of omeprazole 30 capsule 6    multivitamin (ONE DAILY MULTIVITAMIN) per tablet Take 1 tablet by mouth once daily.      citalopram (CELEXA) 20 MG tablet Take 1 tablet (20 mg total) by mouth once daily. (Patient not taking: Reported on 10/23/2019) 90 tablet 1    citalopram (CELEXA) 20 MG tablet Take 1 tablet (20 mg total) by mouth once daily. 30 tablet 11    indomethacin (INDOCIN) 50 MG capsule Take 50 mg by mouth once daily.   0    levomefolate calcium (ELFOLATE) 15 mg Tab Take 15 mg by mouth once daily. 30  tablet 11    pravastatin (PRAVACHOL) 10 MG tablet Take 1 tablet (10 mg total) by mouth once daily. 90 tablet 3     No current facility-administered medications for this visit.        Family History:   Family History   Problem Relation Age of Onset    Diabetes Mother     Hypothyroidism Mother     Diabetes Father     Gout Father     Heart failure Father     Heart disease Father     Hypoglycemic Brother     Cancer Maternal Grandfather        Social History:   Social History     Socioeconomic History    Marital status: Single     Spouse name: Not on file    Number of children: Not on file    Years of education: Not on file    Highest education level: Not on file   Occupational History    Not on file   Social Needs    Financial resource strain: Not on file    Food insecurity:     Worry: Not on file     Inability: Not on file    Transportation needs:     Medical: Not on file     Non-medical: Not on file   Tobacco Use    Smoking status: Never Smoker    Smokeless tobacco: Never Used   Substance and Sexual Activity    Alcohol use: No    Drug use: No    Sexual activity: Not Currently   Lifestyle    Physical activity:     Days per week: Not on file     Minutes per session: Not on file    Stress: To some extent   Relationships    Social connections:     Talks on phone: Not on file     Gets together: Not on file     Attends Orthodoxy service: Not on file     Active member of club or organization: Not on file     Attends meetings of clubs or organizations: Not on file     Relationship status: Not on file   Other Topics Concern    Not on file   Social History Narrative    Not on file       Review of Systems   Respiratory: Negative for shortness of breath.    Cardiovascular: Negative for chest pain and palpitations.   Neurological: Negative for dizziness.   Psychiatric/Behavioral: Negative for confusion, decreased concentration and suicidal ideas. The patient is nervous/anxious ( decreased).         Objective:     Vitals:    10/23/19 0939   BP: 128/86   Pulse: 68   Temp: 97.7 °F (36.5 °C)        Physical Exam    Assessment:       1. Mixed hyperlipidemia    2. Immunization due    3. Depression with anxiety        Plan:       Iván was seen today for anxiety and hyperlipidemia.    Diagnoses and all orders for this visit:    Mixed hyperlipidemia  -     Discontinue: pravastatin (PRAVACHOL) 10 MG tablet; Take 1 tablet (10 mg total) by mouth once daily.    Immunization due    Depression with anxiety  -     levomefolate calcium (ELFOLATE) 15 mg Tab; Take 15 mg by mouth once daily.  -     citalopram (CELEXA) 20 MG tablet; Take 1 tablet (20 mg total) by mouth once daily.  -     pravastatin (PRAVACHOL) 10 MG tablet; Take 1 tablet (10 mg total) by mouth once daily.    Other orders  -     Cancel: Influenza - Quadrivalent (PF)         Follow up in about 3 months (around 1/23/2020) for follow up cholesterol, follow up anxiety.

## 2019-11-01 ENCOUNTER — TELEPHONE (OUTPATIENT)
Dept: FAMILY MEDICINE | Facility: CLINIC | Age: 34
End: 2019-11-01

## 2019-11-01 NOTE — TELEPHONE ENCOUNTER
Patient called in regards to the Levomefolate Calcium cost 100.00.  Can you recommend another supplement that he can take with the Celexa to help increase energy.  The Celexa causes him to have low energy

## 2019-11-04 NOTE — TELEPHONE ENCOUNTER
Left message for patient to call.  Dr. Moran stated patients Options are plain  folic acid 1 mg per day, or gingko biloba to help with energy and focus 1 per day

## 2020-02-04 ENCOUNTER — OFFICE VISIT (OUTPATIENT)
Dept: FAMILY MEDICINE | Facility: CLINIC | Age: 35
End: 2020-02-04
Payer: COMMERCIAL

## 2020-02-04 VITALS
DIASTOLIC BLOOD PRESSURE: 82 MMHG | HEIGHT: 72 IN | OXYGEN SATURATION: 99 % | BODY MASS INDEX: 29.46 KG/M2 | HEART RATE: 84 BPM | TEMPERATURE: 98 F | WEIGHT: 217.5 LBS | SYSTOLIC BLOOD PRESSURE: 120 MMHG

## 2020-02-04 DIAGNOSIS — F41.8 DEPRESSION WITH ANXIETY: Primary | ICD-10-CM

## 2020-02-04 DIAGNOSIS — E78.2 MIXED HYPERLIPIDEMIA: ICD-10-CM

## 2020-02-04 DIAGNOSIS — E79.0 HYPERURICEMIA: ICD-10-CM

## 2020-02-04 PROBLEM — E78.5 HYPERLIPIDEMIA: Status: ACTIVE | Noted: 2020-02-04

## 2020-02-04 PROCEDURE — 3008F BODY MASS INDEX DOCD: CPT | Mod: S$GLB,,, | Performed by: FAMILY MEDICINE

## 2020-02-04 PROCEDURE — 99213 PR OFFICE/OUTPT VISIT, EST, LEVL III, 20-29 MIN: ICD-10-PCS | Mod: S$GLB,,, | Performed by: FAMILY MEDICINE

## 2020-02-04 PROCEDURE — 99213 OFFICE O/P EST LOW 20 MIN: CPT | Mod: S$GLB,,, | Performed by: FAMILY MEDICINE

## 2020-02-04 PROCEDURE — 3008F PR BODY MASS INDEX (BMI) DOCUMENTED: ICD-10-PCS | Mod: S$GLB,,, | Performed by: FAMILY MEDICINE

## 2020-02-04 RX ORDER — CITALOPRAM 40 MG/1
40 TABLET, FILM COATED ORAL DAILY
COMMUNITY
End: 2020-10-13

## 2020-02-04 NOTE — PROGRESS NOTES
Subjective:       Patient ID: Iván Amaral is a 34 y.o. male.    Chief Complaint: Hyperlipidemia (3 month follow up ) and Anxiety (3 month follow up )      Patient is here to follow-up on anxiety and hyperlipidemia.  Lab Results       Component                Value               Date                       WBC                      8.2                 05/14/2018                 HGB                      15.2                05/14/2018                 HCT                      45.4                05/14/2018                 PLT                      368                 01/02/2018                 CHOL                     184                 04/17/2019                 TRIG                     230 (H)             04/17/2019                 HDL                      32 (L)              04/17/2019                 ALT                      25                  04/17/2019                 AST                      22                  04/17/2019                 NA                       141                 04/17/2019                 K                        4.4                 04/17/2019                 CL                       102                 04/17/2019                 CREATININE               1.16                04/17/2019                 BUN                      11                  04/17/2019                 CO2                      25                  04/17/2019                 TSH                      3.600               04/17/2019                 HGBA1C                   5.1                 11/14/2017            Lab Results       Component                Value               Date                       CHOL                     184                 04/17/2019                 CHOL                     208 (H)             11/14/2017            Lab Results       Component                Value               Date                       HDL                      32 (L)              04/17/2019                 HDL                       35 (L)              11/14/2017            Lab Results       Component                Value               Date                       LDLCALC                  106 (H)             04/17/2019                 LDLCALC                  126 (H)             11/14/2017            Lab Results       Component                Value               Date                       TRIG                     230 (H)             04/17/2019                 TRIG                     234 (H)             11/14/2017            No results found for: AUSTYN  Continues to take pravastatin every day.    Hyperlipidemia   This is a chronic problem. The problem is controlled. Pertinent negatives include no chest pain, focal sensory loss, focal weakness, leg pain or shortness of breath.   Anxiety   Presents for follow-up visit. Patient reports no chest pain, compulsions, confusion, decreased concentration, depressed mood, insomnia ( celexa helping with sleep), malaise, muscle tension, obsessions, palpitations, panic, restlessness, shortness of breath or suicidal ideas.        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    Allergies and Medications:   Review of patient's allergies indicates:   Allergen Reactions    Pcn [penicillins]      Current Outpatient Medications   Medication Sig Dispense Refill    allopurinol (ZYLOPRIM) 300 MG tablet Take 1 tablet (300 mg total) by mouth once daily. 90 tablet 1    citalopram (CELEXA) 40 MG tablet Take 40 mg by mouth once daily.      pravastatin (PRAVACHOL) 10 MG tablet Take 1 tablet (10 mg total) by mouth once daily. 90 tablet 3    citalopram (CELEXA) 20 MG tablet Take 1 tablet (20 mg total) by mouth once daily. (Patient not taking: Reported on 2/4/2020) 30 tablet 11    esomeprazole (NEXIUM) 40 MG capsule Take 1 capsule (40 mg total) by mouth before breakfast. Patient failed a six-month course of omeprazole (Patient not taking: Reported on  2/4/2020) 30 capsule 6    indomethacin (INDOCIN) 50 MG capsule Take 50 mg by mouth once daily.   0    levomefolate calcium (ELFOLATE) 15 mg Tab Take 15 mg by mouth once daily. (Patient not taking: Reported on 2/4/2020) 30 tablet 11    multivitamin (ONE DAILY MULTIVITAMIN) per tablet Take 1 tablet by mouth once daily.       No current facility-administered medications for this visit.        Family History:   Family History   Problem Relation Age of Onset    Diabetes Mother     Hypothyroidism Mother     Diabetes Father     Gout Father     Heart failure Father     Heart disease Father     Hypoglycemic Brother     Cancer Maternal Grandfather        Social History:   Social History     Socioeconomic History    Marital status: Single     Spouse name: Not on file    Number of children: Not on file    Years of education: Not on file    Highest education level: Not on file   Occupational History    Not on file   Social Needs    Financial resource strain: Not on file    Food insecurity:     Worry: Not on file     Inability: Not on file    Transportation needs:     Medical: Not on file     Non-medical: Not on file   Tobacco Use    Smoking status: Never Smoker    Smokeless tobacco: Never Used   Substance and Sexual Activity    Alcohol use: No    Drug use: No    Sexual activity: Not Currently   Lifestyle    Physical activity:     Days per week: Not on file     Minutes per session: Not on file    Stress: To some extent   Relationships    Social connections:     Talks on phone: Not on file     Gets together: Not on file     Attends Sikhism service: Not on file     Active member of club or organization: Not on file     Attends meetings of clubs or organizations: Not on file     Relationship status: Not on file   Other Topics Concern    Not on file   Social History Narrative    Not on file       Review of Systems   Respiratory: Negative for shortness of breath.    Cardiovascular: Negative for chest  pain and palpitations.   Neurological: Negative for focal weakness.   Psychiatric/Behavioral: Negative for confusion, decreased concentration and suicidal ideas. The patient does not have insomnia ( celexa helping with sleep).        Objective:     Vitals:    02/04/20 1029   BP: 120/82   Pulse: 84   Temp: 98.4 °F (36.9 °C)        Physical Exam   Cardiovascular: Normal rate, regular rhythm, normal heart sounds and intact distal pulses. Exam reveals no gallop and no friction rub.   No murmur heard.      Assessment:       1. Depression with anxiety    2. Mixed hyperlipidemia    3. Hyperuricemia        Plan:       Iván was seen today for hyperlipidemia and anxiety.    Diagnoses and all orders for this visit:    Depression with anxiety  -     TSH; Future  -     TSH    Mixed hyperlipidemia  -     Comprehensive metabolic panel; Future  -     Lipid panel; Future  -     Comprehensive metabolic panel  -     Lipid panel    Hyperuricemia  -     Uric acid; Future  -     Uric acid         Follow up in about 6 months (around 8/4/2020) for follow up cholesterol.

## 2020-02-11 ENCOUNTER — TELEPHONE (OUTPATIENT)
Dept: FAMILY MEDICINE | Facility: CLINIC | Age: 35
End: 2020-02-11

## 2020-02-11 LAB
ALBUMIN SERPL-MCNC: 4.6 G/DL (ref 4–5)
ALBUMIN/GLOB SERPL: 1.8 {RATIO} (ref 1.2–2.2)
ALP SERPL-CCNC: 139 IU/L (ref 39–117)
ALT SERPL-CCNC: 34 IU/L (ref 0–44)
AST SERPL-CCNC: 20 IU/L (ref 0–40)
BILIRUB SERPL-MCNC: 0.4 MG/DL (ref 0–1.2)
BUN SERPL-MCNC: 13 MG/DL (ref 6–20)
BUN/CREAT SERPL: 12 (ref 9–20)
CALCIUM SERPL-MCNC: 9.2 MG/DL (ref 8.7–10.2)
CHLORIDE SERPL-SCNC: 106 MMOL/L (ref 96–106)
CHOLEST SERPL-MCNC: 218 MG/DL (ref 100–199)
CO2 SERPL-SCNC: 20 MMOL/L (ref 20–29)
CREAT SERPL-MCNC: 1.08 MG/DL (ref 0.76–1.27)
GLOBULIN SER CALC-MCNC: 2.5 G/DL (ref 1.5–4.5)
GLUCOSE SERPL-MCNC: 106 MG/DL (ref 65–99)
HDLC SERPL-MCNC: 34 MG/DL
LDLC SERPL CALC-MCNC: 143 MG/DL (ref 0–99)
POTASSIUM SERPL-SCNC: 4.5 MMOL/L (ref 3.5–5.2)
PROT SERPL-MCNC: 7.1 G/DL (ref 6–8.5)
SODIUM SERPL-SCNC: 141 MMOL/L (ref 134–144)
TRIGL SERPL-MCNC: 203 MG/DL (ref 0–149)
TSH SERPL DL<=0.005 MIU/L-ACNC: 3.63 UIU/ML (ref 0.45–4.5)
URATE SERPL-MCNC: 7.4 MG/DL (ref 3.7–8.6)
VLDLC SERPL CALC-MCNC: 41 MG/DL (ref 5–40)

## 2020-02-11 RX ORDER — PRAVASTATIN SODIUM 20 MG/1
20 TABLET ORAL DAILY
Qty: 90 TABLET | Refills: 3 | Status: SHIPPED | OUTPATIENT
Start: 2020-02-11 | End: 2021-02-10

## 2020-02-11 NOTE — PROGRESS NOTES
The cholesterol has risen back up to 214, LDL to 143.  Recommend increasing pravastatin to 20 mg more aggressive low-cholesterol diet recheck in 3 months.

## 2020-02-12 ENCOUNTER — TELEPHONE (OUTPATIENT)
Dept: FAMILY MEDICINE | Facility: CLINIC | Age: 35
End: 2020-02-12

## 2020-02-12 NOTE — TELEPHONE ENCOUNTER
----- Message from Hugo Moran MD sent at 2/11/2020  8:14 AM CST -----  The cholesterol has risen back up to 214, LDL to 143.  Recommend increasing pravastatin to 20 mg more aggressive low-cholesterol diet recheck in 3 months.

## 2020-03-23 ENCOUNTER — NURSE TRIAGE (OUTPATIENT)
Dept: ADMINISTRATIVE | Facility: CLINIC | Age: 35
End: 2020-03-23

## 2020-03-23 NOTE — TELEPHONE ENCOUNTER
The patient complains of dry cough, congestion, and intermittent sinus headaches onset 8 days ago, which has worsened to a productive cough with yellow/green sputum onset 1 day ago. The patient reports he has had post-nasal drip, increased salivation, sweats and body aches for the last 2 days. The patient denies fever, runny nose, shortness of breath, and chest pain.    The patient has been taking Dayquil for cold symptoms with mild relief and ibuprofen for headache with moderate relief.    Temperature was last taken prior to calling and was 97.9 F    Patient needs excuse note for work.    PMHx sinus infections     Reason for Disposition   Patient wants to be seen    Additional Information   Negative: Severe difficulty breathing (e.g., struggling for each breath, speaks in single words)   Negative: Very weak (can't stand)   Negative: Sounds like a life-threatening emergency to the triager   Negative: Runny nose is caused by pollen or other allergies   Negative: Sore throat is the main symptom   Negative: Patient sounds very sick or weak to the triager   Negative: Fever > 103 F (39.4 C)   Negative: Fever > 101 F (38.3 C) and over 60 years of age   Negative: Fever > 100.0 F (37.8 C) and has diabetes mellitus or a weak immune system (e.g., HIV positive, cancer chemotherapy, organ transplant, splenectomy, chronic steroids)   Negative: Fever > 100.0 F (37.8 C) and bedridden (e.g., nursing home patient, stroke, chronic illness, recovering from surgery)   Negative: Fever present > 3 days (72 hours)   Negative: Fever returns after gone for over 24 hours and symptoms worse or not improved   Negative: Sinus pain (not just congestion) and fever   Negative: Earache   Negative: Sinus congestion (pressure, fullness) present > 10 days   Negative: Nasal discharge present > 10 days   Negative: Using nasal washes and pain medicine > 24 hours and sinus pain (lower forehead, cheekbone, or eye) persists   Negative:  Sore throat present > 5 days    Protocols used: COMMON COLD-A-[place]

## 2020-05-19 ENCOUNTER — OFFICE VISIT (OUTPATIENT)
Dept: FAMILY MEDICINE | Facility: CLINIC | Age: 35
End: 2020-05-19
Payer: COMMERCIAL

## 2020-05-19 VITALS
SYSTOLIC BLOOD PRESSURE: 118 MMHG | HEIGHT: 72 IN | WEIGHT: 218.31 LBS | RESPIRATION RATE: 16 BRPM | OXYGEN SATURATION: 99 % | TEMPERATURE: 98 F | BODY MASS INDEX: 29.57 KG/M2 | DIASTOLIC BLOOD PRESSURE: 80 MMHG | HEART RATE: 84 BPM

## 2020-05-19 DIAGNOSIS — R19.5 CHANGE IN CONSISTENCY OF STOOL: ICD-10-CM

## 2020-05-19 DIAGNOSIS — K64.4 EXTERNAL HEMORRHOIDS: ICD-10-CM

## 2020-05-19 DIAGNOSIS — Z11.4 SCREENING FOR HIV (HUMAN IMMUNODEFICIENCY VIRUS): ICD-10-CM

## 2020-05-19 DIAGNOSIS — Z00.00 PREVENTATIVE HEALTH CARE: Primary | ICD-10-CM

## 2020-05-19 PROCEDURE — 99213 PR OFFICE/OUTPT VISIT, EST, LEVL III, 20-29 MIN: ICD-10-PCS | Mod: S$GLB,,, | Performed by: FAMILY MEDICINE

## 2020-05-19 PROCEDURE — 3008F PR BODY MASS INDEX (BMI) DOCUMENTED: ICD-10-PCS | Mod: S$GLB,,, | Performed by: FAMILY MEDICINE

## 2020-05-19 PROCEDURE — 99213 OFFICE O/P EST LOW 20 MIN: CPT | Mod: S$GLB,,, | Performed by: FAMILY MEDICINE

## 2020-05-19 PROCEDURE — 3008F BODY MASS INDEX DOCD: CPT | Mod: S$GLB,,, | Performed by: FAMILY MEDICINE

## 2020-05-19 NOTE — PROGRESS NOTES
Subjective:       Patient ID: Iván Amaral is a 35 y.o. male.    Chief Complaint: Fatigue (patient also concerned about bowel movement shape )      Patient originally had appointment for follow-up on anxiety depression he says that is stable were every he has new issues with fatigue and concerned about bowel movements.  Bowel movements S approximately 1 month ago started to become flat and ribbon shaped subsequent to that the become very dark.  No recent alcohol consumption no smoking.  Now that have become quite soft.  Wt Readings from Last 3 Encounters:  05/19/20 : 99 kg (218 lb 4.8 oz)  02/04/20 : 98.7 kg (217 lb 8 oz)  10/23/19 : 93.9 kg (207 lb 1.6 oz)  Appetite has been good thinking a lot of water.        Fatigue   This is a new problem. The current episode started more than 1 month ago. Associated symptoms include fatigue. Pertinent negatives include no arthralgias, change in bowel habit, chest pain, chills, congestion, coughing, diaphoresis, headaches, joint swelling, myalgias, neck pain, numbness, rash, sore throat, swollen glands, urinary symptoms, vertigo, visual change or weakness. Associated symptoms comments: Change in bowel movements- flat. Nothing aggravates the symptoms. He has tried nothing for the symptoms. The treatment provided moderate relief.       Allergies and Medications:   Review of patient's allergies indicates:   Allergen Reactions    Pcn [penicillins]      Current Outpatient Medications   Medication Sig Dispense Refill    allopurinol (ZYLOPRIM) 300 MG tablet Take 1 tablet (300 mg total) by mouth once daily. 90 tablet 1    citalopram (CELEXA) 40 MG tablet Take 40 mg by mouth once daily.      esomeprazole (NEXIUM) 40 MG capsule Take 1 capsule (40 mg total) by mouth before breakfast. Patient failed a six-month course of omeprazole 30 capsule 6    multivitamin (ONE DAILY MULTIVITAMIN) per tablet Take 1 tablet by mouth once daily.      pravastatin (PRAVACHOL) 20 MG tablet  Take 1 tablet (20 mg total) by mouth once daily. 90 tablet 3    indomethacin (INDOCIN) 50 MG capsule Take 50 mg by mouth once daily.   0    levomefolate calcium (ELFOLATE) 15 mg Tab Take 15 mg by mouth once daily. (Patient not taking: Reported on 2/4/2020) 30 tablet 11     No current facility-administered medications for this visit.        Family History:   Family History   Problem Relation Age of Onset    Diabetes Mother     Hypothyroidism Mother     Diabetes Father     Gout Father     Heart failure Father     Heart disease Father     Hypoglycemic Brother     Cancer Maternal Grandfather        Social History:   Social History     Socioeconomic History    Marital status: Single     Spouse name: Not on file    Number of children: Not on file    Years of education: Not on file    Highest education level: Not on file   Occupational History    Not on file   Social Needs    Financial resource strain: Not on file    Food insecurity:     Worry: Not on file     Inability: Not on file    Transportation needs:     Medical: Not on file     Non-medical: Not on file   Tobacco Use    Smoking status: Never Smoker    Smokeless tobacco: Never Used   Substance and Sexual Activity    Alcohol use: No    Drug use: No    Sexual activity: Not Currently   Lifestyle    Physical activity:     Days per week: Not on file     Minutes per session: Not on file    Stress: Only a little   Relationships    Social connections:     Talks on phone: Not on file     Gets together: Not on file     Attends Yazdanism service: Not on file     Active member of club or organization: Not on file     Attends meetings of clubs or organizations: Not on file     Relationship status: Not on file   Other Topics Concern    Not on file   Social History Narrative    Not on file       Review of Systems   Constitutional: Positive for fatigue. Negative for chills and diaphoresis.   HENT: Negative for congestion and sore throat.    Respiratory:  Negative for cough.    Cardiovascular: Negative for chest pain.   Gastrointestinal: Negative for change in bowel habit.   Musculoskeletal: Negative for arthralgias, joint swelling, myalgias and neck pain.   Skin: Negative for rash.   Neurological: Negative for vertigo, weakness, numbness and headaches.       Objective:     Vitals:    05/19/20 0948   BP: 118/80   Pulse: 84   Resp: 16   Temp: 98.4 °F (36.9 °C)        Physical Exam   Constitutional: He appears well-developed and well-nourished.   Abdominal: Soft. Bowel sounds are normal. He exhibits no distension and no mass. There is no tenderness. There is no rebound and no guarding. No hernia.   Genitourinary: Prostate normal, testes normal and penis normal. Rectal exam shows external hemorrhoid. Rectal exam shows no internal hemorrhoid, no fissure, no mass and no tenderness.       Cremasteric reflex is present. Right testis shows no mass, no swelling and no tenderness. Right testis is descended. Cremasteric reflex is not absent on the right side. Left testis shows no mass, no swelling and no tenderness. Left testis is descended. Cremasteric reflex is not absent on the left side. Circumcised. No phimosis, paraphimosis, hypospadias, penile erythema or penile tenderness. No discharge found.   Nursing note and vitals reviewed.      Assessment:       1. stool changes.  There has some been some dark stools suggestive of a high GI bleed at some point in the past       Plan:       Iván was seen today for fatigue.    Diagnoses and all orders for this visit:    Preventative health care    stool  -     Occult blood x 1, stool; Future  -     Stool Exam-Ova,Cysts,Parasites; Future  -     WBC, Stool; Future  -     Ambulatory referral/consult to Gastroenterology; Future  -     CBC auto differential; Future  -     C-Reactive Protein; Future    External hemorrhoids    Screening for HIV (human immunodeficiency virus)  -     HIV 1/2 Ag/Ab (4th Gen); Future         Follow up in  about 6 months (around 11/19/2020) for follow up anxiety.

## 2020-05-20 ENCOUNTER — TELEPHONE (OUTPATIENT)
Dept: FAMILY MEDICINE | Facility: CLINIC | Age: 35
End: 2020-05-20

## 2020-05-20 LAB
BASOPHILS # BLD AUTO: 0 X10E3/UL (ref 0–0.2)
BASOPHILS NFR BLD AUTO: 0 %
CRP SERPL-MCNC: 2 MG/L (ref 0–10)
EOSINOPHIL # BLD AUTO: 0.3 X10E3/UL (ref 0–0.4)
EOSINOPHIL NFR BLD AUTO: 5 %
ERYTHROCYTE [DISTWIDTH] IN BLOOD BY AUTOMATED COUNT: 14.1 % (ref 11.6–15.4)
HCT VFR BLD AUTO: 45.7 % (ref 37.5–51)
HGB BLD-MCNC: 15.6 G/DL (ref 13–17.7)
HIV 1+2 AB+HIV1 P24 AG SERPL QL IA: NON REACTIVE
IMM GRANULOCYTES # BLD AUTO: 0 X10E3/UL (ref 0–0.1)
IMM GRANULOCYTES NFR BLD AUTO: 0 %
LYMPHOCYTES # BLD AUTO: 2.8 X10E3/UL (ref 0.7–3.1)
LYMPHOCYTES NFR BLD AUTO: 42 %
MCH RBC QN AUTO: 30.2 PG (ref 26.6–33)
MCHC RBC AUTO-ENTMCNC: 34.1 G/DL (ref 31.5–35.7)
MCV RBC AUTO: 89 FL (ref 79–97)
MONOCYTES # BLD AUTO: 0.4 X10E3/UL (ref 0.1–0.9)
MONOCYTES NFR BLD AUTO: 6 %
NEUTROPHILS # BLD AUTO: 3.2 X10E3/UL (ref 1.4–7)
NEUTROPHILS NFR BLD AUTO: 47 %
PLATELET # BLD AUTO: 296 X10E3/UL (ref 150–450)
RBC # BLD AUTO: 5.16 X10E6/UL (ref 4.14–5.8)
WBC # BLD AUTO: 6.7 X10E3/UL (ref 3.4–10.8)

## 2020-05-20 NOTE — TELEPHONE ENCOUNTER
Left VM about results    ----- Message from Hugo Moran MD sent at 5/20/2020  8:08 AM CDT -----  Results Ok, notify patient.

## 2020-05-24 LAB
WBC STL QL MICRO: NORMAL
WBC STL QL MICRO: NORMAL

## 2020-05-26 LAB
O+P SPEC MICRO: NORMAL
O+P STL CONC: NORMAL

## 2020-11-03 ENCOUNTER — TELEMEDICINE (OUTPATIENT)
Dept: TELEMEDICINE | Facility: CLINIC | Age: 35
End: 2020-11-03

## 2020-11-03 NOTE — OUTSIDE NOTE - [employer]
Visit Summary for JOHN SORIA - Gender: Male - Date of Birth: 1985  Date: [id number] - Duration: 11 minutes  Patient: JOHN SORIA  Provider: Teresa Chance    Patient Contact Information  Address  [address] [place] [address]  [phone]    Visit Topics  flu symptoms with extreme nausea [Added By: Self - 2020-11-03]  Health History  Diastolic Blood Pressure: N/A  Systolic Blood Pressure: N/A  Body Weight: 215.0 [lb_av] [Added By: Self - 2020-11-03]  Body Temperature: N/A  Allergies: [Penicillins] [Added By: Self - 2020-11-03]    Conversation Transcripts       [Notification] You are connected with Asha Robinson-Parks, [facility].  [Notification] JOHN SORIA is located in [place].  [Notification] JOHN SORIA has shared health history...  [Notification] Teresa Chance has extended this visit by 120 minutes, to   continue to address your care  [Notification] Teresa Chance has added a diagnosis/procedure code.    Diagnosis  Other specified viral diseases  Value: B33.8  Code: ICD-10-CM    Procedures  Value: [id number] Code: CPT-4 OL DIG E/M SVC 11-20 MIN    Medications Prescribed  ondansetron HCl  Dose : 2 tablets  Strength : 4 mg  Route : oral  Frequency : 3 times a day. As needed.   Patient Instructions : As needed for nausea and vomiting  Refills : 0  Instructions to the Pharmacist : Substitutions allowed    Medications Entered by the Patient during intake  citalopram [Added By: Self - 2020-11-03]  allopurinol [Added By: Self - 2020-11-03]  pravastatin [Added By: Self - 2020-11-03]    Provider Notes         Contact phone number: As noted        Mode of Communication: Phone, Video does not connect        HPI:34 yo male with flu like symptoms with extreme nausea. Is requesting   promethazine. Experienced dizziness earlier today. His Flu swab today was   negative.    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  Denies any recent international or domestic travel.  Got a rapid COVID19 test today at an urgent care center that was negative.        PMH: HTN, Gout, Anxiety, Hypercholesterolemia    PSH: Denies    Meds: allopurinol, citalopram, pravastatin    Allergies: Penicillin  ROS:  Positive for chills  Positive for nausea and vomiting  Positive for diarrhea  Positive for myalgias  Positive for fatigue  Positive for malaise  Positive for cough  Positive for feeling winded  Positive for fatigue  Negative for chest pain  Negative for abdominal pain        Exam: Temp: Patient denies, says it is normal    Gen: Patient sounds ill, video does not connect  Patient sounds congested  HEENT:  Denies neck swelilng or tenderness  Pulmonary: Witnessed cough on interview                  No wheezing audible    Assessment: Viral syndrome        Plan:      1. I am prescribing Ondansetron for your nausea which is Ondansetron. Your   symptoms are suggestive of a viral illness such as COVID19. You should obtain a   standard test for COVID19.       2. Discussed precautions. If you develop shortness of breath, chest pain,   confusion, dizziness, change in vision, or worsening fatigue/weakness you are   advised to report to your nearest emergency department immediately for   evaluaion.        Follow up:    1. If there are any questions or problems with the prescription, call   [phone] anytime for assistance.       2. Please re-connect for another online visit or see an in-person provider   should your symptoms worsen or persist.      3. Taking a probiotic (either in pill form or by eating yogurt that contains   probiotics) while using antibiotics can help prevent some of the troublesome   side effects that antibiotics can sometimes cause.    4. Please print a copy of this note and send it to your regular doctor, or take   it to your next visit so it may be included in your medical record.         Patient  voiced understanding and agrees to plan.        Please see your PCP on an annual basis.    Electronically signed by: Teresa Chance(NPI [id number])

## 2020-11-05 ENCOUNTER — TELEPHONE (OUTPATIENT)
Dept: FAMILY MEDICINE | Facility: CLINIC | Age: 35
End: 2020-11-05

## 2020-11-05 NOTE — TELEPHONE ENCOUNTER
Patient had visit at [facility] for nausea covid like symptoms.  See visit in chart. Patient stated he is still having the symptoms just waiting on covid test results.  Advised in symptoms worsen and his covid test is negative he needs to return to clinic for a follow up per Dr. Moran see note in chart

## 2020-12-03 ENCOUNTER — PATIENT MESSAGE (OUTPATIENT)
Dept: FAMILY MEDICINE | Facility: CLINIC | Age: 35
End: 2020-12-03

## 2020-12-03 DIAGNOSIS — M10.9 GOUT OF ANKLE, UNSPECIFIED CAUSE, UNSPECIFIED CHRONICITY, UNSPECIFIED LATERALITY: Primary | ICD-10-CM

## 2020-12-04 ENCOUNTER — PATIENT MESSAGE (OUTPATIENT)
Dept: FAMILY MEDICINE | Facility: CLINIC | Age: 35
End: 2020-12-04

## 2020-12-04 DIAGNOSIS — M10.9 GOUT OF ANKLE, UNSPECIFIED CAUSE, UNSPECIFIED CHRONICITY, UNSPECIFIED LATERALITY: ICD-10-CM

## 2020-12-04 RX ORDER — COLCHICINE 0.6 MG/1
0.6 TABLET ORAL 2 TIMES DAILY
Qty: 28 TABLET | Refills: 0 | Status: SHIPPED | OUTPATIENT
Start: 2020-12-04 | End: 2020-12-04 | Stop reason: SDUPTHER

## 2020-12-04 RX ORDER — COLCHICINE 0.6 MG/1
0.6 TABLET ORAL 2 TIMES DAILY
Qty: 28 TABLET | Refills: 0 | Status: SHIPPED | OUTPATIENT
Start: 2020-12-04 | End: 2022-01-19 | Stop reason: SDUPTHER

## 2020-12-07 ENCOUNTER — OFFICE VISIT (OUTPATIENT)
Dept: FAMILY MEDICINE | Facility: CLINIC | Age: 35
End: 2020-12-07
Payer: COMMERCIAL

## 2020-12-07 VITALS
WEIGHT: 214.5 LBS | OXYGEN SATURATION: 98 % | RESPIRATION RATE: 16 BRPM | SYSTOLIC BLOOD PRESSURE: 130 MMHG | HEIGHT: 72 IN | HEART RATE: 64 BPM | DIASTOLIC BLOOD PRESSURE: 80 MMHG | TEMPERATURE: 97 F | BODY MASS INDEX: 29.05 KG/M2

## 2020-12-07 DIAGNOSIS — F41.8 DEPRESSION WITH ANXIETY: Primary | ICD-10-CM

## 2020-12-07 DIAGNOSIS — M10.9 ACUTE GOUT OF LEFT FOOT, UNSPECIFIED CAUSE: ICD-10-CM

## 2020-12-07 PROCEDURE — 99214 OFFICE O/P EST MOD 30 MIN: CPT | Mod: S$GLB,,, | Performed by: FAMILY MEDICINE

## 2020-12-07 PROCEDURE — 3008F PR BODY MASS INDEX (BMI) DOCUMENTED: ICD-10-PCS | Mod: S$GLB,,, | Performed by: FAMILY MEDICINE

## 2020-12-07 PROCEDURE — 3008F BODY MASS INDEX DOCD: CPT | Mod: S$GLB,,, | Performed by: FAMILY MEDICINE

## 2020-12-07 PROCEDURE — 1125F AMNT PAIN NOTED PAIN PRSNT: CPT | Mod: S$GLB,,, | Performed by: FAMILY MEDICINE

## 2020-12-07 PROCEDURE — 99214 PR OFFICE/OUTPT VISIT, EST, LEVL IV, 30-39 MIN: ICD-10-PCS | Mod: S$GLB,,, | Performed by: FAMILY MEDICINE

## 2020-12-07 PROCEDURE — 1125F PR PAIN SEVERITY QUANTIFIED, PAIN PRESENT: ICD-10-PCS | Mod: S$GLB,,, | Performed by: FAMILY MEDICINE

## 2020-12-07 NOTE — PROGRESS NOTES
Subjective:       Patient ID: Iván Amaral is a 35 y.o. male.    Chief Complaint: Anxiety (6 month follow up ) and Gout      Patient is here to follow-up on anxiety from last visit.  Patient has in the interim had a gout flare up, was started on colchicine b.i.d. and is getting some relief.  Patient is definitely getting some relief with the Celexa.  Lab Results       Component                Value               Date                       WBC                      6.7                 05/19/2020                 HGB                      15.6                05/19/2020                 HCT                      45.7                05/19/2020                 PLT                      296                 05/19/2020                 CHOL                     218 (H)             02/10/2020                 TRIG                     203 (H)             02/10/2020                 HDL                      34 (L)              02/10/2020                 ALT                      34                  02/10/2020                 AST                      20                  02/10/2020                 NA                       141                 02/10/2020                 K                        4.5                 02/10/2020                 CL                       106                 02/10/2020                 CREATININE               1.08                02/10/2020                 BUN                      13                  02/10/2020                 CO2                      20                  02/10/2020                 TSH                      3.630               02/10/2020                 HGBA1C                   5.1                 11/14/2017                Anxiety  Presents for follow-up visit. Patient reports no chest pain, compulsions, confusion, decreased concentration, depressed mood, dizziness, dry mouth, excessive worry, feeling of choking, hyperventilation, impotence, insomnia, irritability, malaise, muscle tension,  nausea, nervous/anxious behavior, obsessions, palpitations, panic, shortness of breath or suicidal ideas. Primary symptoms comment: Doing his it considerably better did lose a PET recently but is getting through it.. Symptoms occur most days. The quality of sleep is good. Nighttime awakenings: occasional, one to two.           Allergies and Medications:   Review of patient's allergies indicates:   Allergen Reactions    Pcn [penicillins]      Current Outpatient Medications   Medication Sig Dispense Refill    allopurinoL (ZYLOPRIM) 300 MG tablet TAKE 1 TABLET DAILY 90 tablet 3    citalopram (CELEXA) 40 MG tablet TAKE 1 TABLET DAILY 90 tablet 1    colchicine (COLCRYS) 0.6 mg tablet Take 1 tablet (0.6 mg total) by mouth 2 (two) times daily. for 14 days 28 tablet 0    multivitamin (ONE DAILY MULTIVITAMIN) per tablet Take 1 tablet by mouth once daily.      pravastatin (PRAVACHOL) 20 MG tablet Take 1 tablet (20 mg total) by mouth once daily. 90 tablet 3     No current facility-administered medications for this visit.        Family History:   Family History   Problem Relation Age of Onset    Diabetes Mother     Hypothyroidism Mother     Diabetes Father     Gout Father     Heart failure Father     Heart disease Father     Hypoglycemic Brother     Cancer Maternal Grandfather        Social History:   Social History     Socioeconomic History    Marital status: Single     Spouse name: Not on file    Number of children: Not on file    Years of education: Not on file    Highest education level: Not on file   Occupational History    Not on file   Social Needs    Financial resource strain: Not hard at all    Food insecurity     Worry: Never true     Inability: Never true    Transportation needs     Medical: No     Non-medical: No   Tobacco Use    Smoking status: Never Smoker    Smokeless tobacco: Never Used   Substance and Sexual Activity    Alcohol use: No     Frequency: Monthly or less     Drinks per  session: 1 or 2     Binge frequency: Never    Drug use: No    Sexual activity: Not Currently   Lifestyle    Physical activity     Days per week: 5 days     Minutes per session: 150+ min    Stress: Rather much   Relationships    Social connections     Talks on phone: Twice a week     Gets together: Once a week     Attends Yazdanism service: Not on file     Active member of club or organization: No     Attends meetings of clubs or organizations: Never     Relationship status: Never    Other Topics Concern    Not on file   Social History Narrative    Not on file       Review of Systems   Constitutional: Negative for irritability.   Respiratory: Negative for shortness of breath.    Cardiovascular: Negative for chest pain and palpitations.   Gastrointestinal: Negative for nausea.   Genitourinary: Negative for impotence.   Neurological: Negative for dizziness.   Psychiatric/Behavioral: Negative for confusion, decreased concentration and suicidal ideas. The patient is not nervous/anxious and does not have insomnia.        Objective:     Vitals:    12/07/20 1105   BP: 130/80   Pulse: 64   Resp: 16   Temp: 97.3 °F (36.3 °C)        Physical Exam  Vitals signs and nursing note reviewed.   Constitutional:       Appearance: He is well-developed.   HENT:      Head: Normocephalic and atraumatic.   Eyes:      Conjunctiva/sclera: Conjunctivae normal.      Pupils: Pupils are equal, round, and reactive to light.   Neck:      Musculoskeletal: Normal range of motion.   Pulmonary:      Effort: Pulmonary effort is normal.      Breath sounds: Normal breath sounds.   Musculoskeletal: Normal range of motion.      Comments: There is almost no tenderness to the 2nd through 4th toes of the left foot at the previous site of gout.   Skin:     General: Skin is dry.      Findings: No erythema.   Psychiatric:         Mood and Affect: Mood normal.         Behavior: Behavior normal.         Thought Content: Thought content normal.          Judgment: Judgment normal.         Assessment:       1. Depression with anxiety    2. Acute gout of left foot, unspecified cause        Plan:       Iván was seen today for anxiety and gout.    Diagnoses and all orders for this visit:    Depression with anxiety    Acute gout of left foot, unspecified cause         Follow up in about 6 months (around 6/7/2021) for annual.

## 2021-03-01 ENCOUNTER — OFFICE VISIT (OUTPATIENT)
Dept: URGENT CARE | Facility: CLINIC | Age: 36
End: 2021-03-01
Payer: COMMERCIAL

## 2021-03-01 VITALS
DIASTOLIC BLOOD PRESSURE: 89 MMHG | RESPIRATION RATE: 19 BRPM | OXYGEN SATURATION: 99 % | BODY MASS INDEX: 30.38 KG/M2 | WEIGHT: 217 LBS | TEMPERATURE: 98 F | HEART RATE: 110 BPM | HEIGHT: 71 IN | SYSTOLIC BLOOD PRESSURE: 129 MMHG

## 2021-03-01 DIAGNOSIS — R11.10 VOMITING AND DIARRHEA: ICD-10-CM

## 2021-03-01 DIAGNOSIS — K52.9 GASTROENTERITIS: Primary | ICD-10-CM

## 2021-03-01 DIAGNOSIS — R19.7 VOMITING AND DIARRHEA: ICD-10-CM

## 2021-03-01 LAB
CTP QC/QA: YES
SARS-COV-2 RDRP RESP QL NAA+PROBE: NEGATIVE

## 2021-03-01 PROCEDURE — U0002 COVID-19 LAB TEST NON-CDC: HCPCS | Mod: QW,S$GLB,, | Performed by: NURSE PRACTITIONER

## 2021-03-01 PROCEDURE — 3008F BODY MASS INDEX DOCD: CPT | Mod: CPTII,S$GLB,, | Performed by: NURSE PRACTITIONER

## 2021-03-01 PROCEDURE — 99204 PR OFFICE/OUTPT VISIT, NEW, LEVL IV, 45-59 MIN: ICD-10-PCS | Mod: S$GLB,,, | Performed by: NURSE PRACTITIONER

## 2021-03-01 PROCEDURE — 3008F PR BODY MASS INDEX (BMI) DOCUMENTED: ICD-10-PCS | Mod: CPTII,S$GLB,, | Performed by: NURSE PRACTITIONER

## 2021-03-01 PROCEDURE — U0002: ICD-10-PCS | Mod: QW,S$GLB,, | Performed by: NURSE PRACTITIONER

## 2021-03-01 PROCEDURE — 99204 OFFICE O/P NEW MOD 45 MIN: CPT | Mod: S$GLB,,, | Performed by: NURSE PRACTITIONER

## 2021-03-01 RX ORDER — ONDANSETRON 4 MG/1
4 TABLET, ORALLY DISINTEGRATING ORAL EVERY 8 HOURS PRN
Qty: 15 TABLET | Refills: 0 | Status: SHIPPED | OUTPATIENT
Start: 2021-03-01 | End: 2021-06-11

## 2021-03-01 RX ORDER — ONDANSETRON 4 MG/1
4 TABLET, ORALLY DISINTEGRATING ORAL
Status: COMPLETED | OUTPATIENT
Start: 2021-03-01 | End: 2021-03-01

## 2021-03-01 RX ADMIN — ONDANSETRON 4 MG: 4 TABLET, ORALLY DISINTEGRATING ORAL at 09:03

## 2021-06-11 ENCOUNTER — OFFICE VISIT (OUTPATIENT)
Dept: FAMILY MEDICINE | Facility: CLINIC | Age: 36
End: 2021-06-11
Payer: COMMERCIAL

## 2021-06-11 VITALS
DIASTOLIC BLOOD PRESSURE: 88 MMHG | BODY MASS INDEX: 30.56 KG/M2 | OXYGEN SATURATION: 98 % | HEART RATE: 93 BPM | TEMPERATURE: 98 F | HEIGHT: 71 IN | RESPIRATION RATE: 16 BRPM | WEIGHT: 218.31 LBS | SYSTOLIC BLOOD PRESSURE: 118 MMHG

## 2021-06-11 DIAGNOSIS — G25.81 RESTLESS LEG: Primary | ICD-10-CM

## 2021-06-11 DIAGNOSIS — E66.9 CLASS 1 OBESITY WITH BODY MASS INDEX (BMI) OF 30.0 TO 30.9 IN ADULT, UNSPECIFIED OBESITY TYPE, UNSPECIFIED WHETHER SERIOUS COMORBIDITY PRESENT: ICD-10-CM

## 2021-06-11 DIAGNOSIS — E78.2 MIXED HYPERLIPIDEMIA: ICD-10-CM

## 2021-06-11 PROCEDURE — 1126F PR PAIN SEVERITY QUANTIFIED, NO PAIN PRESENT: ICD-10-PCS | Mod: S$GLB,,, | Performed by: FAMILY MEDICINE

## 2021-06-11 PROCEDURE — 1126F AMNT PAIN NOTED NONE PRSNT: CPT | Mod: S$GLB,,, | Performed by: FAMILY MEDICINE

## 2021-06-11 PROCEDURE — 3008F BODY MASS INDEX DOCD: CPT | Mod: S$GLB,,, | Performed by: FAMILY MEDICINE

## 2021-06-11 PROCEDURE — 99214 PR OFFICE/OUTPT VISIT, EST, LEVL IV, 30-39 MIN: ICD-10-PCS | Mod: S$GLB,,, | Performed by: FAMILY MEDICINE

## 2021-06-11 PROCEDURE — 99214 OFFICE O/P EST MOD 30 MIN: CPT | Mod: S$GLB,,, | Performed by: FAMILY MEDICINE

## 2021-06-11 PROCEDURE — 3008F PR BODY MASS INDEX (BMI) DOCUMENTED: ICD-10-PCS | Mod: S$GLB,,, | Performed by: FAMILY MEDICINE

## 2021-06-11 RX ORDER — GABAPENTIN 300 MG/1
300 CAPSULE ORAL NIGHTLY
Qty: 30 CAPSULE | Refills: 11 | Status: SHIPPED | OUTPATIENT
Start: 2021-06-11 | End: 2021-07-07 | Stop reason: SDUPTHER

## 2021-07-02 ENCOUNTER — PATIENT MESSAGE (OUTPATIENT)
Dept: FAMILY MEDICINE | Facility: CLINIC | Age: 36
End: 2021-07-02

## 2021-07-07 DIAGNOSIS — G25.81 RESTLESS LEG: ICD-10-CM

## 2021-07-07 RX ORDER — GABAPENTIN 300 MG/1
300 CAPSULE ORAL NIGHTLY
Qty: 30 CAPSULE | Refills: 5 | Status: SHIPPED | OUTPATIENT
Start: 2021-07-07 | End: 2022-03-22

## 2021-07-08 ENCOUNTER — TELEPHONE (OUTPATIENT)
Dept: FAMILY MEDICINE | Facility: CLINIC | Age: 36
End: 2021-07-08

## 2021-07-08 LAB
25(OH)D3+25(OH)D2 SERPL-MCNC: 6.2 NG/ML (ref 30–100)
ALBUMIN SERPL-MCNC: 4.7 G/DL (ref 4–5)
ALBUMIN/GLOB SERPL: 1.9 {RATIO} (ref 1.2–2.2)
ALP SERPL-CCNC: 166 IU/L (ref 48–121)
ALT SERPL-CCNC: 30 IU/L (ref 0–44)
AST SERPL-CCNC: 24 IU/L (ref 0–40)
BILIRUB SERPL-MCNC: 0.5 MG/DL (ref 0–1.2)
BUN SERPL-MCNC: 17 MG/DL (ref 6–20)
BUN/CREAT SERPL: 16 (ref 9–20)
CALCIUM SERPL-MCNC: 9.3 MG/DL (ref 8.7–10.2)
CHLORIDE SERPL-SCNC: 105 MMOL/L (ref 96–106)
CHOLEST SERPL-MCNC: 192 MG/DL (ref 100–199)
CO2 SERPL-SCNC: 22 MMOL/L (ref 20–29)
CREAT SERPL-MCNC: 1.07 MG/DL (ref 0.76–1.27)
GLOBULIN SER CALC-MCNC: 2.5 G/DL (ref 1.5–4.5)
GLUCOSE SERPL-MCNC: 98 MG/DL (ref 65–99)
HDLC SERPL-MCNC: 30 MG/DL
LDLC SERPL CALC-MCNC: 110 MG/DL (ref 0–99)
POTASSIUM SERPL-SCNC: 4.3 MMOL/L (ref 3.5–5.2)
PROT SERPL-MCNC: 7.2 G/DL (ref 6–8.5)
SODIUM SERPL-SCNC: 138 MMOL/L (ref 134–144)
TRIGL SERPL-MCNC: 303 MG/DL (ref 0–149)
VLDLC SERPL CALC-MCNC: 52 MG/DL (ref 5–40)

## 2021-09-16 ENCOUNTER — OCCUPATIONAL HEALTH (OUTPATIENT)
Dept: URGENT CARE | Facility: CLINIC | Age: 36
End: 2021-09-16

## 2021-09-16 DIAGNOSIS — Z02.89 ENCOUNTER FOR PHYSICAL EXAMINATION RELATED TO EMPLOYMENT: ICD-10-CM

## 2021-09-16 PROCEDURE — 97750 PHYSICAL PERFORMANCE TEST: CPT | Mod: S$GLB,,, | Performed by: PREVENTIVE MEDICINE

## 2021-09-16 PROCEDURE — 97750 PR IPCS BIODEX: ICD-10-PCS | Mod: S$GLB,,, | Performed by: PREVENTIVE MEDICINE

## 2021-11-09 ENCOUNTER — OCCUPATIONAL HEALTH (OUTPATIENT)
Dept: URGENT CARE | Facility: CLINIC | Age: 36
End: 2021-11-09

## 2021-11-09 PROCEDURE — 80305 PR DRUG SCREEN - 1: ICD-10-PCS | Mod: S$GLB,,, | Performed by: EMERGENCY MEDICINE

## 2021-11-09 PROCEDURE — 80305 DRUG TEST PRSMV DIR OPT OBS: CPT | Mod: S$GLB,,, | Performed by: EMERGENCY MEDICINE

## 2021-12-30 ENCOUNTER — IMMUNIZATION (OUTPATIENT)
Dept: FAMILY MEDICINE | Facility: CLINIC | Age: 36
End: 2021-12-30

## 2021-12-30 DIAGNOSIS — Z23 NEED FOR VACCINATION: Primary | ICD-10-CM

## 2021-12-30 PROCEDURE — 0004A COVID-19, MRNA, LNP-S, PF, 30 MCG/0.3 ML DOSE VACCINE: ICD-10-PCS | Mod: ,,, | Performed by: FAMILY MEDICINE

## 2021-12-30 PROCEDURE — 91300 COVID-19, MRNA, LNP-S, PF, 30 MCG/0.3 ML DOSE VACCINE: ICD-10-PCS | Mod: ,,, | Performed by: FAMILY MEDICINE

## 2021-12-30 PROCEDURE — 0004A COVID-19, MRNA, LNP-S, PF, 30 MCG/0.3 ML DOSE VACCINE: CPT | Mod: ,,, | Performed by: FAMILY MEDICINE

## 2021-12-30 PROCEDURE — 91300 COVID-19, MRNA, LNP-S, PF, 30 MCG/0.3 ML DOSE VACCINE: CPT | Mod: ,,, | Performed by: FAMILY MEDICINE

## 2022-01-14 ENCOUNTER — OFFICE VISIT (OUTPATIENT)
Dept: FAMILY MEDICINE | Facility: CLINIC | Age: 37
End: 2022-01-14
Payer: COMMERCIAL

## 2022-01-14 VITALS
BODY MASS INDEX: 32.73 KG/M2 | HEIGHT: 71 IN | TEMPERATURE: 98 F | HEART RATE: 81 BPM | OXYGEN SATURATION: 96 % | SYSTOLIC BLOOD PRESSURE: 126 MMHG | DIASTOLIC BLOOD PRESSURE: 78 MMHG | RESPIRATION RATE: 16 BRPM | WEIGHT: 233.81 LBS

## 2022-01-14 DIAGNOSIS — E78.2 MIXED HYPERLIPIDEMIA: ICD-10-CM

## 2022-01-14 DIAGNOSIS — F41.8 DEPRESSION WITH ANXIETY: ICD-10-CM

## 2022-01-14 DIAGNOSIS — M10.9 ACUTE GOUT OF LEFT FOOT, UNSPECIFIED CAUSE: ICD-10-CM

## 2022-01-14 DIAGNOSIS — H69.92 DYSFUNCTION OF LEFT EUSTACHIAN TUBE: ICD-10-CM

## 2022-01-14 DIAGNOSIS — T56.0X1A ACUTE LEAD-INDUCED GOUT INVOLVING TOE OF LEFT FOOT, INITIAL ENCOUNTER: ICD-10-CM

## 2022-01-14 DIAGNOSIS — Z23 NEED FOR IMMUNIZATION AGAINST INFLUENZA: Primary | ICD-10-CM

## 2022-01-14 DIAGNOSIS — Z00.00 PREVENTATIVE HEALTH CARE: ICD-10-CM

## 2022-01-14 DIAGNOSIS — M10.172 ACUTE LEAD-INDUCED GOUT INVOLVING TOE OF LEFT FOOT, INITIAL ENCOUNTER: ICD-10-CM

## 2022-01-14 DIAGNOSIS — E79.0 HYPERURICEMIA: ICD-10-CM

## 2022-01-14 PROCEDURE — 90471 FLU VACCINE (QUAD) GREATER THAN OR EQUAL TO 3YO PRESERVATIVE FREE IM: ICD-10-PCS | Mod: S$GLB,,, | Performed by: FAMILY MEDICINE

## 2022-01-14 PROCEDURE — 99214 OFFICE O/P EST MOD 30 MIN: CPT | Mod: 25,S$GLB,, | Performed by: FAMILY MEDICINE

## 2022-01-14 PROCEDURE — 3008F BODY MASS INDEX DOCD: CPT | Mod: S$GLB,,, | Performed by: FAMILY MEDICINE

## 2022-01-14 PROCEDURE — 90686 IIV4 VACC NO PRSV 0.5 ML IM: CPT | Mod: S$GLB,,, | Performed by: FAMILY MEDICINE

## 2022-01-14 PROCEDURE — 90471 IMMUNIZATION ADMIN: CPT | Mod: S$GLB,,, | Performed by: FAMILY MEDICINE

## 2022-01-14 PROCEDURE — 90686 FLU VACCINE (QUAD) GREATER THAN OR EQUAL TO 3YO PRESERVATIVE FREE IM: ICD-10-PCS | Mod: S$GLB,,, | Performed by: FAMILY MEDICINE

## 2022-01-14 PROCEDURE — 99214 PR OFFICE/OUTPT VISIT, EST, LEVL IV, 30-39 MIN: ICD-10-PCS | Mod: 25,S$GLB,, | Performed by: FAMILY MEDICINE

## 2022-01-14 PROCEDURE — 3008F PR BODY MASS INDEX (BMI) DOCUMENTED: ICD-10-PCS | Mod: S$GLB,,, | Performed by: FAMILY MEDICINE

## 2022-01-14 RX ORDER — BUPROPION HYDROCHLORIDE 150 MG/1
150 TABLET, EXTENDED RELEASE ORAL 2 TIMES DAILY
Qty: 60 TABLET | Refills: 11 | Status: ON HOLD | OUTPATIENT
Start: 2022-01-14 | End: 2022-04-29 | Stop reason: HOSPADM

## 2022-01-14 RX ORDER — CITALOPRAM 40 MG/1
40 TABLET, FILM COATED ORAL DAILY
Qty: 90 TABLET | Refills: 1 | Status: SHIPPED | OUTPATIENT
Start: 2022-01-14 | End: 2022-01-14

## 2022-01-14 RX ORDER — BUPROPION HYDROCHLORIDE 150 MG/1
150 TABLET, EXTENDED RELEASE ORAL 2 TIMES DAILY
Qty: 60 TABLET | Refills: 11 | Status: SHIPPED | OUTPATIENT
Start: 2022-01-14 | End: 2022-01-14 | Stop reason: SDUPTHER

## 2022-01-14 RX ORDER — PRAVASTATIN SODIUM 10 MG/1
10 TABLET ORAL DAILY
Qty: 90 TABLET | Refills: 3 | Status: SHIPPED | OUTPATIENT
Start: 2022-01-14 | End: 2022-01-18

## 2022-01-14 RX ORDER — ALLOPURINOL 300 MG/1
300 TABLET ORAL DAILY
Qty: 90 TABLET | Refills: 3 | Status: ON HOLD | OUTPATIENT
Start: 2022-01-14 | End: 2022-04-29 | Stop reason: HOSPADM

## 2022-01-14 RX ORDER — PRAVASTATIN SODIUM 10 MG/1
10 TABLET ORAL DAILY
Qty: 90 TABLET | Refills: 3 | Status: SHIPPED | OUTPATIENT
Start: 2022-01-14 | End: 2022-01-14

## 2022-01-14 NOTE — PROGRESS NOTES
Subjective:       Patient ID: Iván Amaral is a 36 y.o. male.    Chief Complaint: Anxiety, Hyperlipidemia, Gout, Otitis Media, and genetic testing (Alpha 1 antitrypsin/)      Patient is here for re-evaluation of his citalopram he is having breakthrough anxiety, he is here is experiencing some occasional episodes of withdrawal symptoms although he is not stop taking the medicine.      Anxiety  Presents for follow-up visit. Symptoms include dizziness (episodic.) and restlessness. Patient reports no chest pain, compulsions, confusion, decreased concentration, depressed mood, dry mouth, excessive worry, feeling of choking, hyperventilation, impotence or insomnia (Rare).       Hyperlipidemia  Pertinent negatives include no chest pain.   Otitis Media:   Chronicity:  New  Onset:  In the past 7 days  Progression since onset:  Gradually worsening  Frequency:  Every few hours  Pain scale:  0/10  Severity:  Mild  Duration:  1 minute   Associated symptoms: dizziness (episodic.), ear congestion and congestion.        Allergies and Medications:   Review of patient's allergies indicates:   Allergen Reactions    Pcn [penicillins]      Current Outpatient Medications   Medication Sig Dispense Refill    allopurinoL (ZYLOPRIM) 300 MG tablet Take 1 tablet (300 mg total) by mouth once daily. 90 tablet 3    buPROPion (WELLBUTRIN SR) 150 MG TBSR 12 hr tablet Take 1 tablet (150 mg total) by mouth 2 (two) times daily. 60 tablet 11    colchicine (COLCRYS) 0.6 mg tablet Take 1 tablet (0.6 mg total) by mouth 2 (two) times daily. for 14 days 28 tablet 0    gabapentin (NEURONTIN) 300 MG capsule Take 1 capsule (300 mg total) by mouth every evening. (Patient not taking: Reported on 1/14/2022) 30 capsule 5    multivitamin (THERAGRAN) per tablet Take 1 tablet by mouth once daily.      pravastatin (PRAVACHOL) 10 MG tablet Take 1 tablet (10 mg total) by mouth once daily. 90 tablet 3     No current facility-administered medications for  this visit.       Family History:   Family History   Problem Relation Age of Onset    Diabetes Mother     Hypothyroidism Mother     Diabetes Father     Gout Father     Heart failure Father     Heart disease Father     Hypoglycemic Brother     Cancer Maternal Grandfather        Social History:   Social History     Socioeconomic History    Marital status: Single   Tobacco Use    Smoking status: Never Smoker    Smokeless tobacco: Never Used   Substance and Sexual Activity    Alcohol use: No    Drug use: No    Sexual activity: Not Currently       Review of Systems   HENT: Positive for congestion.    Cardiovascular: Negative for chest pain.   Genitourinary: Negative for impotence.   Neurological: Positive for dizziness (episodic.).   Psychiatric/Behavioral: Negative for confusion and decreased concentration. The patient does not have insomnia (Rare).        Objective:     Vitals:    01/14/22 1436   BP: 126/78   Pulse: 81   Resp: 16   Temp: 98.4 °F (36.9 °C)        Physical Exam  Vitals and nursing note reviewed.   Constitutional:       Appearance: He is well-developed and well-nourished. He is not diaphoretic.   HENT:      Head: Normocephalic.      Right Ear: Tympanic membrane and external ear normal. There is no impacted cerumen.      Left Ear: Tympanic membrane, ear canal and external ear normal. There is no impacted cerumen.   Eyes:      Extraocular Movements: EOM normal.      Conjunctiva/sclera: Conjunctivae normal.      Pupils: Pupils are equal, round, and reactive to light.   Cardiovascular:      Rate and Rhythm: Normal rate and regular rhythm.      Pulses: Intact distal pulses.      Heart sounds: Normal heart sounds. No murmur heard.  No friction rub. No gallop.    Pulmonary:      Effort: Pulmonary effort is normal. No respiratory distress.      Breath sounds: Normal breath sounds. No stridor. No wheezing, rhonchi or rales.   Chest:      Chest wall: No tenderness.   Skin:     General: Skin is warm  and dry.   Psychiatric:         Mood and Affect: Mood and affect normal.         Behavior: Behavior normal.         Thought Content: Thought content normal.         Judgment: Judgment normal.         Assessment:       1. Need for immunization against influenza    2. Acute lead-induced gout involving toe of left foot, initial encounter    3. Hyperuricemia    4. Acute gout of left foot, unspecified cause    5. Depression with anxiety    6. Mixed hyperlipidemia    7. Preventative health care    8. Dysfunction of left eustachian tube        Plan:       Iván was seen today for anxiety, hyperlipidemia, gout, otitis media and genetic testing.    Diagnoses and all orders for this visit:    Need for immunization against influenza  -     Influenza - Quadrivalent *Preferred* (6 months+) (PF)    Acute lead-induced gout involving toe of left foot, initial encounter  -     allopurinoL (ZYLOPRIM) 300 MG tablet; Take 1 tablet (300 mg total) by mouth once daily.    Hyperuricemia  -     Uric acid; Future    Acute gout of left foot, unspecified cause  -     Uric acid; Future  -     CBC Auto Differential; Future  -     X-Ray Foot Complete Left; Future    Depression with anxiety  -     Discontinue: citalopram (CELEXA) 40 MG tablet; Take 1 tablet (40 mg total) by mouth once daily.  -     buPROPion (WELLBUTRIN SR) 150 MG TBSR 12 hr tablet; Take 1 tablet (150 mg total) by mouth 2 (two) times daily.    Mixed hyperlipidemia  -     pravastatin (PRAVACHOL) 10 MG tablet; Take 1 tablet (10 mg total) by mouth once daily.  -     Lipid Panel; Future  -     Comprehensive Metabolic Panel; Future    Preventative health care  -     Hepatitis C Antibody; Future    Dysfunction of left eustachian tube         Follow up in about 6 months (around 7/14/2022).

## 2022-01-17 ENCOUNTER — HOSPITAL ENCOUNTER (OUTPATIENT)
Dept: RADIOLOGY | Facility: HOSPITAL | Age: 37
Discharge: HOME | End: 2022-01-17
Attending: FAMILY MEDICINE
Payer: COMMERCIAL

## 2022-01-17 ENCOUNTER — PATIENT MESSAGE (OUTPATIENT)
Dept: FAMILY MEDICINE | Facility: CLINIC | Age: 37
End: 2022-01-17
Payer: COMMERCIAL

## 2022-01-17 DIAGNOSIS — M10.9 ACUTE GOUT OF LEFT FOOT, UNSPECIFIED CAUSE: ICD-10-CM

## 2022-01-17 PROCEDURE — 73630 X-RAY EXAM OF FOOT: CPT | Mod: TC,LT

## 2022-01-18 DIAGNOSIS — E78.2 MIXED HYPERLIPIDEMIA: Primary | ICD-10-CM

## 2022-01-18 RX ORDER — PRAVASTATIN SODIUM 20 MG/1
20 TABLET ORAL DAILY
Qty: 90 TABLET | Refills: 3 | Status: ON HOLD | OUTPATIENT
Start: 2022-01-18 | End: 2022-04-19 | Stop reason: HOSPADM

## 2022-01-18 RX ORDER — INDOMETHACIN 50 MG/1
50 CAPSULE ORAL 2 TIMES DAILY WITH MEALS
Qty: 28 CAPSULE | OUTPATIENT
Start: 2022-01-18 | End: 2022-02-01

## 2022-01-18 NOTE — TELEPHONE ENCOUNTER
Is withdrawal effects are usually transient, but you can try taking the Celexa 1/2 tablet a day for 3 days and then stop should not last longer than 2 weeks.  The Wellbutrin has and also tear defect to compensate for this but will have to see how it does.

## 2022-01-19 ENCOUNTER — PATIENT MESSAGE (OUTPATIENT)
Dept: FAMILY MEDICINE | Facility: CLINIC | Age: 37
End: 2022-01-19
Payer: COMMERCIAL

## 2022-03-22 ENCOUNTER — OFFICE VISIT (OUTPATIENT)
Dept: FAMILY MEDICINE | Facility: CLINIC | Age: 37
End: 2022-03-22
Payer: COMMERCIAL

## 2022-03-22 VITALS
RESPIRATION RATE: 20 BRPM | HEIGHT: 71 IN | WEIGHT: 234.13 LBS | TEMPERATURE: 99 F | OXYGEN SATURATION: 98 % | SYSTOLIC BLOOD PRESSURE: 128 MMHG | HEART RATE: 81 BPM | DIASTOLIC BLOOD PRESSURE: 93 MMHG | BODY MASS INDEX: 32.78 KG/M2

## 2022-03-22 DIAGNOSIS — J30.1 SEASONAL ALLERGIC RHINITIS DUE TO POLLEN: Primary | ICD-10-CM

## 2022-03-22 PROCEDURE — 3074F PR MOST RECENT SYSTOLIC BLOOD PRESSURE < 130 MM HG: ICD-10-PCS | Mod: S$GLB,,, | Performed by: FAMILY MEDICINE

## 2022-03-22 PROCEDURE — 3008F BODY MASS INDEX DOCD: CPT | Mod: S$GLB,,, | Performed by: FAMILY MEDICINE

## 2022-03-22 PROCEDURE — 3080F DIAST BP >= 90 MM HG: CPT | Mod: S$GLB,,, | Performed by: FAMILY MEDICINE

## 2022-03-22 PROCEDURE — 99213 OFFICE O/P EST LOW 20 MIN: CPT | Mod: 25,S$GLB,, | Performed by: FAMILY MEDICINE

## 2022-03-22 PROCEDURE — 99213 PR OFFICE/OUTPT VISIT, EST, LEVL III, 20-29 MIN: ICD-10-PCS | Mod: 25,S$GLB,, | Performed by: FAMILY MEDICINE

## 2022-03-22 PROCEDURE — 3074F SYST BP LT 130 MM HG: CPT | Mod: S$GLB,,, | Performed by: FAMILY MEDICINE

## 2022-03-22 PROCEDURE — 3080F PR MOST RECENT DIASTOLIC BLOOD PRESSURE >= 90 MM HG: ICD-10-PCS | Mod: S$GLB,,, | Performed by: FAMILY MEDICINE

## 2022-03-22 PROCEDURE — 3008F PR BODY MASS INDEX (BMI) DOCUMENTED: ICD-10-PCS | Mod: S$GLB,,, | Performed by: FAMILY MEDICINE

## 2022-03-22 RX ORDER — METHYLPREDNISOLONE 4 MG/1
TABLET ORAL
Qty: 1 TABLET | Refills: 0 | Status: ON HOLD | OUTPATIENT
Start: 2022-03-22 | End: 2022-04-19 | Stop reason: HOSPADM

## 2022-03-22 RX ORDER — FLUTICASONE PROPIONATE 50 MCG
1 SPRAY, SUSPENSION (ML) NASAL DAILY
Qty: 16 G | Refills: 3 | Status: SHIPPED | OUTPATIENT
Start: 2022-03-22 | End: 2022-09-28

## 2022-03-22 NOTE — PROGRESS NOTES
Subjective:       Patient ID: Iván Amaral is a 37 y.o. male.    Chief Complaint: Cough      Patient had a mild upper respiratory infection a month ago all of his symptoms resolved except he has a persistent cough which has been mild but neck Ng.  Worse at night.  Very minimally productive in mostly dry. Had negative COVID test.    Cough  This is a recurrent problem. The current episode started more than 1 month ago. The problem has been waxing and waning. The problem occurs constantly. The cough is productive of blood-tinged sputum and productive of sputum (minimally). Associated symptoms include nasal congestion and postnasal drip. Pertinent negatives include no chest pain, chills, ear congestion, ear pain, fever, headaches, heartburn, hemoptysis, myalgias, rash, rhinorrhea, sore throat, shortness of breath, sweats, weight loss or wheezing. Nothing aggravates the symptoms. He has tried OTC cough suppressant, body position changes, cool air, prescription cough suppressant and rest for the symptoms. His past medical history is significant for bronchitis. There is no history of asthma, bronchiectasis, COPD, emphysema, environmental allergies or pneumonia.       Allergies and Medications:   Review of patient's allergies indicates:   Allergen Reactions    Pcn [penicillins]      Current Outpatient Medications   Medication Sig Dispense Refill    allopurinoL (ZYLOPRIM) 300 MG tablet Take 1 tablet (300 mg total) by mouth once daily. 90 tablet 3    buPROPion (WELLBUTRIN SR) 150 MG TBSR 12 hr tablet Take 1 tablet (150 mg total) by mouth 2 (two) times daily. 60 tablet 11    multivitamin (THERAGRAN) per tablet Take 1 tablet by mouth once daily.      pravastatin (PRAVACHOL) 20 MG tablet Take 1 tablet (20 mg total) by mouth once daily. 90 tablet 3    colchicine (COLCRYS) 0.6 mg tablet Take 1 tablet (0.6 mg total) by mouth 2 (two) times daily. for 14 days 28 tablet 3    fluticasone propionate (FLONASE) 50  mcg/actuation nasal spray 1 spray (50 mcg total) by Each Nostril route once daily. 16 g 3    gabapentin (NEURONTIN) 300 MG capsule Take 1 capsule (300 mg total) by mouth every evening. (Patient not taking: Reported on 1/14/2022) 30 capsule 5    methylPREDNISolone (MEDROL DOSEPACK) 4 mg tablet use as directed 1 tablet 0     No current facility-administered medications for this visit.       Family History:   Family History   Problem Relation Age of Onset    Diabetes Mother     Hypothyroidism Mother     Diabetes Father     Gout Father     Heart failure Father     Heart disease Father     Hypoglycemic Brother     Cancer Maternal Grandfather        Social History:   Social History     Socioeconomic History    Marital status: Single   Tobacco Use    Smoking status: Never Smoker    Smokeless tobacco: Never Used   Substance and Sexual Activity    Alcohol use: No    Drug use: No    Sexual activity: Not Currently     Social Determinants of Health     Financial Resource Strain: Low Risk     Difficulty of Paying Living Expenses: Not hard at all   Food Insecurity: No Food Insecurity    Worried About Running Out of Food in the Last Year: Never true    Ran Out of Food in the Last Year: Never true   Transportation Needs: No Transportation Needs    Lack of Transportation (Medical): No    Lack of Transportation (Non-Medical): No   Physical Activity: Sufficiently Active    Days of Exercise per Week: 5 days    Minutes of Exercise per Session: 60 min   Stress: Stress Concern Present    Feeling of Stress : To some extent   Social Connections: Unknown    Frequency of Communication with Friends and Family: Twice a week    Frequency of Social Gatherings with Friends and Family: Once a week    Active Member of Clubs or Organizations: No    Attends Club or Organization Meetings: Never    Marital Status: Never    Housing Stability: Low Risk     Unable to Pay for Housing in the Last Year: No    Number of  Places Lived in the Last Year: 1    Unstable Housing in the Last Year: No       Review of Systems   Constitutional: Negative for chills, fever and weight loss.   HENT: Positive for postnasal drip. Negative for ear pain, rhinorrhea and sore throat.    Respiratory: Positive for cough. Negative for hemoptysis, shortness of breath and wheezing.    Cardiovascular: Negative for chest pain.   Gastrointestinal: Negative for heartburn.   Musculoskeletal: Negative for myalgias.   Skin: Negative for rash.   Allergic/Immunologic: Negative for environmental allergies.   Neurological: Negative for headaches.       Objective:     Vitals:    03/22/22 1035   BP: (!) 128/93   Pulse: 81   Resp: 20   Temp: 98.5 °F (36.9 °C)        Physical Exam  Vitals and nursing note reviewed.   Constitutional:       General: He is not in acute distress.     Appearance: He is well-developed. He is not diaphoretic.   HENT:      Head: Normocephalic and atraumatic.      Right Ear: Hearing, tympanic membrane, ear canal and external ear normal. No decreased hearing noted. No drainage, swelling or tenderness. No middle ear effusion. No foreign body. No hemotympanum. Tympanic membrane is not injected, scarred, perforated, erythematous, retracted or bulging. Tympanic membrane has normal mobility.      Left Ear: Hearing, tympanic membrane, ear canal and external ear normal. No decreased hearing noted. No drainage, swelling or tenderness.  No middle ear effusion. No foreign body. No hemotympanum. Tympanic membrane is not injected, scarred, perforated, erythematous, retracted or bulging. Tympanic membrane has normal mobility.      Nose: Nose normal. No nasal deformity, septal deviation, laceration, mucosal edema or rhinorrhea.      Right Sinus: No maxillary sinus tenderness or frontal sinus tenderness.      Left Sinus: No maxillary sinus tenderness or frontal sinus tenderness.      Mouth/Throat:      Dentition: Normal dentition.      Pharynx: Uvula midline. No  oropharyngeal exudate or posterior oropharyngeal erythema.      Tonsils: No tonsillar abscesses.   Eyes:      General: No scleral icterus.        Right eye: No discharge.         Left eye: No discharge.      Conjunctiva/sclera: Conjunctivae normal.      Pupils: Pupils are equal, round, and reactive to light.   Neck:      Thyroid: No thyromegaly.   Cardiovascular:      Rate and Rhythm: Normal rate and regular rhythm.      Heart sounds: Normal heart sounds. No murmur heard.    No friction rub. No gallop.   Pulmonary:      Effort: Pulmonary effort is normal. No respiratory distress.      Breath sounds: Normal breath sounds. No stridor. No wheezing, rhonchi or rales.   Chest:      Chest wall: No tenderness.   Musculoskeletal:      Cervical back: Normal range of motion and neck supple.   Lymphadenopathy:      Cervical: No cervical adenopathy.         Assessment:       1. Seasonal allergic rhinitis due to pollen        Plan:       Iván was seen today for cough.    Diagnoses and all orders for this visit:    Seasonal allergic rhinitis due to pollen  -     methylPREDNISolone (MEDROL DOSEPACK) 4 mg tablet; use as directed  -     fluticasone propionate (FLONASE) 50 mcg/actuation nasal spray; 1 spray (50 mcg total) by Each Nostril route once daily.  -     X-Ray Chest PA And Lateral; Future         Follow up if symptoms worsen or fail to improve.  If no improvement would consider ENT referral as well.

## 2022-03-28 ENCOUNTER — PATIENT MESSAGE (OUTPATIENT)
Dept: FAMILY MEDICINE | Facility: CLINIC | Age: 37
End: 2022-03-28

## 2022-03-28 ENCOUNTER — PATIENT MESSAGE (OUTPATIENT)
Dept: FAMILY MEDICINE | Facility: CLINIC | Age: 37
End: 2022-03-28
Payer: COMMERCIAL

## 2022-03-28 DIAGNOSIS — J30.1 SEASONAL ALLERGIC RHINITIS DUE TO POLLEN: Primary | ICD-10-CM

## 2022-03-28 RX ORDER — BENZONATATE 100 MG/1
100 CAPSULE ORAL 3 TIMES DAILY PRN
Qty: 40 CAPSULE | Refills: 3 | Status: SHIPPED | OUTPATIENT
Start: 2022-03-28 | End: 2022-04-07

## 2022-04-18 ENCOUNTER — HOSPITAL ENCOUNTER (INPATIENT)
Facility: HOSPITAL | Age: 37
LOS: 1 days | Discharge: HOME | DRG: 069 | End: 2022-04-19
Attending: EMERGENCY MEDICINE | Admitting: HOSPITALIST
Payer: COMMERCIAL

## 2022-04-18 DIAGNOSIS — I60.9: ICD-10-CM

## 2022-04-18 DIAGNOSIS — I63.9 STROKE: ICD-10-CM

## 2022-04-18 DIAGNOSIS — I60.9 SUBARACHNOID HEMORRHAGE: ICD-10-CM

## 2022-04-18 DIAGNOSIS — G45.9 TIA (TRANSIENT ISCHEMIC ATTACK): Primary | ICD-10-CM

## 2022-04-18 LAB
ALBUMIN SERPL BCP-MCNC: 4.5 G/DL (ref 3.5–5.2)
ALP SERPL-CCNC: 182 U/L (ref 55–135)
ALT SERPL W/O P-5'-P-CCNC: 57 U/L (ref 10–44)
ANION GAP SERPL CALC-SCNC: 13 MMOL/L (ref 8–16)
AST SERPL-CCNC: 27 U/L (ref 10–40)
BASOPHILS # BLD AUTO: 0.03 K/UL (ref 0–0.2)
BASOPHILS NFR BLD: 0.5 % (ref 0–1.9)
BILIRUB SERPL-MCNC: 0.4 MG/DL (ref 0.1–1)
BNP SERPL-MCNC: 6 PG/ML (ref 0–99)
BUN SERPL-MCNC: 15 MG/DL (ref 6–20)
CALCIUM SERPL-MCNC: 9.6 MG/DL (ref 8.7–10.5)
CHLORIDE SERPL-SCNC: 104 MMOL/L (ref 95–110)
CHOLEST SERPL-MCNC: 189 MG/DL (ref 120–199)
CHOLEST/HDLC SERPL: 7 {RATIO} (ref 2–5)
CO2 SERPL-SCNC: 22 MMOL/L (ref 23–29)
CREAT SERPL-MCNC: 1.3 MG/DL (ref 0.5–1.4)
CREAT SERPL-MCNC: 1.3 MG/DL (ref 0.5–1.4)
DIFFERENTIAL METHOD: NORMAL
EOSINOPHIL # BLD AUTO: 0.4 K/UL (ref 0–0.5)
EOSINOPHIL NFR BLD: 6.7 % (ref 0–8)
ERYTHROCYTE [DISTWIDTH] IN BLOOD BY AUTOMATED COUNT: 12.7 % (ref 11.5–14.5)
EST. GFR  (AFRICAN AMERICAN): >60 ML/MIN/1.73 M^2
EST. GFR  (NON AFRICAN AMERICAN): >60 ML/MIN/1.73 M^2
GLUCOSE SERPL-MCNC: 103 MG/DL (ref 70–110)
GLUCOSE SERPL-MCNC: 104 MG/DL (ref 70–110)
HCT VFR BLD AUTO: 46 % (ref 40–54)
HDLC SERPL-MCNC: 27 MG/DL (ref 40–75)
HDLC SERPL: 14.3 % (ref 20–50)
HGB BLD-MCNC: 16.2 G/DL (ref 14–18)
IMM GRANULOCYTES # BLD AUTO: 0.01 K/UL (ref 0–0.04)
IMM GRANULOCYTES NFR BLD AUTO: 0.2 % (ref 0–0.5)
INR PPP: 1
LDLC SERPL CALC-MCNC: 106 MG/DL (ref 63–159)
LYMPHOCYTES # BLD AUTO: 2.2 K/UL (ref 1–4.8)
LYMPHOCYTES NFR BLD: 39.3 % (ref 18–48)
MAGNESIUM SERPL-MCNC: 2.3 MG/DL (ref 1.6–2.6)
MCH RBC QN AUTO: 29.2 PG (ref 27–31)
MCHC RBC AUTO-ENTMCNC: 35.2 G/DL (ref 32–36)
MCV RBC AUTO: 83 FL (ref 82–98)
MONOCYTES # BLD AUTO: 0.6 K/UL (ref 0.3–1)
MONOCYTES NFR BLD: 10.3 % (ref 4–15)
NEUTROPHILS # BLD AUTO: 2.4 K/UL (ref 1.8–7.7)
NEUTROPHILS NFR BLD: 43 % (ref 38–73)
NONHDLC SERPL-MCNC: 162 MG/DL
NRBC BLD-RTO: 0 /100 WBC
PLATELET # BLD AUTO: 354 K/UL (ref 150–450)
PMV BLD AUTO: 9.4 FL (ref 9.2–12.9)
POTASSIUM SERPL-SCNC: 3.9 MMOL/L (ref 3.5–5.1)
PROT SERPL-MCNC: 8.2 G/DL (ref 6–8.4)
PROTHROMBIN TIME: 12.7 SEC (ref 11.4–13.7)
RBC # BLD AUTO: 5.54 M/UL (ref 4.6–6.2)
SAMPLE: NORMAL
SARS-COV-2 RDRP RESP QL NAA+PROBE: NEGATIVE
SODIUM SERPL-SCNC: 139 MMOL/L (ref 136–145)
TRIGL SERPL-MCNC: 280 MG/DL (ref 30–150)
TROPONIN I SERPL DL<=0.01 NG/ML-MCNC: <0.03 NG/ML
TSH SERPL DL<=0.005 MIU/L-ACNC: 4.41 UIU/ML (ref 0.34–5.6)
WBC # BLD AUTO: 5.63 K/UL (ref 3.9–12.7)

## 2022-04-18 PROCEDURE — 80053 COMPREHEN METABOLIC PANEL: CPT | Performed by: EMERGENCY MEDICINE

## 2022-04-18 PROCEDURE — A9585 GADOBUTROL INJECTION: HCPCS | Performed by: EMERGENCY MEDICINE

## 2022-04-18 PROCEDURE — 83880 ASSAY OF NATRIURETIC PEPTIDE: CPT | Performed by: EMERGENCY MEDICINE

## 2022-04-18 PROCEDURE — 83735 ASSAY OF MAGNESIUM: CPT | Performed by: EMERGENCY MEDICINE

## 2022-04-18 PROCEDURE — 85025 COMPLETE CBC W/AUTO DIFF WBC: CPT | Performed by: EMERGENCY MEDICINE

## 2022-04-18 PROCEDURE — U0002 COVID-19 LAB TEST NON-CDC: HCPCS | Performed by: EMERGENCY MEDICINE

## 2022-04-18 PROCEDURE — 93010 EKG 12-LEAD: ICD-10-PCS | Mod: ,,, | Performed by: SPECIALIST

## 2022-04-18 PROCEDURE — 93005 ELECTROCARDIOGRAM TRACING: CPT | Performed by: SPECIALIST

## 2022-04-18 PROCEDURE — 84484 ASSAY OF TROPONIN QUANT: CPT | Performed by: EMERGENCY MEDICINE

## 2022-04-18 PROCEDURE — 85610 PROTHROMBIN TIME: CPT | Performed by: EMERGENCY MEDICINE

## 2022-04-18 PROCEDURE — 25500020 PHARM REV CODE 255: Performed by: EMERGENCY MEDICINE

## 2022-04-18 PROCEDURE — 20000000 HC ICU ROOM

## 2022-04-18 PROCEDURE — 80061 LIPID PANEL: CPT | Performed by: EMERGENCY MEDICINE

## 2022-04-18 PROCEDURE — 25000003 PHARM REV CODE 250: Performed by: NURSE PRACTITIONER

## 2022-04-18 PROCEDURE — 84443 ASSAY THYROID STIM HORMONE: CPT | Performed by: EMERGENCY MEDICINE

## 2022-04-18 PROCEDURE — 93010 ELECTROCARDIOGRAM REPORT: CPT | Mod: ,,, | Performed by: SPECIALIST

## 2022-04-18 PROCEDURE — 99285 EMERGENCY DEPT VISIT HI MDM: CPT | Mod: 25

## 2022-04-18 RX ORDER — SODIUM,POTASSIUM PHOSPHATES 280-250MG
2 POWDER IN PACKET (EA) ORAL
Status: DISCONTINUED | OUTPATIENT
Start: 2022-04-18 | End: 2022-04-19 | Stop reason: HOSPADM

## 2022-04-18 RX ORDER — BUPROPION HYDROCHLORIDE 150 MG/1
150 TABLET, EXTENDED RELEASE ORAL 2 TIMES DAILY
Status: DISCONTINUED | OUTPATIENT
Start: 2022-04-18 | End: 2022-04-19 | Stop reason: HOSPADM

## 2022-04-18 RX ORDER — PRAVASTATIN SODIUM 10 MG/1
10 TABLET ORAL DAILY
Status: ON HOLD | COMMUNITY
End: 2022-04-19 | Stop reason: HOSPADM

## 2022-04-18 RX ORDER — ONDANSETRON 2 MG/ML
4 INJECTION INTRAMUSCULAR; INTRAVENOUS EVERY 8 HOURS PRN
Status: DISCONTINUED | OUTPATIENT
Start: 2022-04-18 | End: 2022-04-19 | Stop reason: HOSPADM

## 2022-04-18 RX ORDER — PRAVASTATIN SODIUM 10 MG/1
10 TABLET ORAL DAILY
Status: DISCONTINUED | OUTPATIENT
Start: 2022-04-19 | End: 2022-04-18

## 2022-04-18 RX ORDER — ACETAMINOPHEN 325 MG/1
650 TABLET ORAL EVERY 6 HOURS PRN
Status: DISCONTINUED | OUTPATIENT
Start: 2022-04-18 | End: 2022-04-19 | Stop reason: HOSPADM

## 2022-04-18 RX ORDER — GADOBUTROL 604.72 MG/ML
9.5 INJECTION INTRAVENOUS
Status: COMPLETED | OUTPATIENT
Start: 2022-04-18 | End: 2022-04-18

## 2022-04-18 RX ORDER — LANOLIN ALCOHOL/MO/W.PET/CERES
800 CREAM (GRAM) TOPICAL
Status: DISCONTINUED | OUTPATIENT
Start: 2022-04-18 | End: 2022-04-19 | Stop reason: HOSPADM

## 2022-04-18 RX ORDER — SODIUM CHLORIDE 0.9 % (FLUSH) 0.9 %
10 SYRINGE (ML) INJECTION
Status: DISCONTINUED | OUTPATIENT
Start: 2022-04-18 | End: 2022-04-19 | Stop reason: HOSPADM

## 2022-04-18 RX ORDER — ATORVASTATIN CALCIUM 40 MG/1
40 TABLET, FILM COATED ORAL DAILY
Status: DISCONTINUED | OUTPATIENT
Start: 2022-04-19 | End: 2022-04-19 | Stop reason: HOSPADM

## 2022-04-18 RX ORDER — BENZONATATE 100 MG/1
100 CAPSULE ORAL 3 TIMES DAILY PRN
COMMUNITY
End: 2022-09-28

## 2022-04-18 RX ADMIN — BUPROPION HYDROCHLORIDE 150 MG: 150 TABLET, EXTENDED RELEASE ORAL at 09:04

## 2022-04-18 RX ADMIN — IOHEXOL 100 ML: 350 INJECTION, SOLUTION INTRAVENOUS at 03:04

## 2022-04-18 RX ADMIN — GADOBUTROL 9.5 ML: 604.72 INJECTION INTRAVENOUS at 05:04

## 2022-04-18 NOTE — ED PROVIDER NOTES
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 today began having some numbness in his right handed forearm and clumsiness of the right hand while typing.  Patient states that the numbness became more diffuse involving the entire right side of his body.  All the symptoms lasted approximately 15 minutes.  The patient denies any gait disturbance or actual motor weakness in his extremities.  There was no associated headache or visual changes.  No nausea vomiting.  No chest pain or palpitations.  No abdominal pain, vomiting, diarrhea.  No trouble urinating.  No similar past problems.  Upon presentation the patient's blood pressure was in the mid 140s systolic and upper 90s diastolic.  At the time of my assessment the patient states he essentially felt back to normal.        Review of patient's allergies indicates:   Allergen Reactions    Pcn [penicillins]      Past Medical History:   Diagnosis Date    Gout 2019    Hyperlipidemia     Stomach ulcer      Past Surgical History:   Procedure Laterality Date    NASAL SEPTUM SURGERY       Family History   Problem Relation Age of Onset    Diabetes Mother     Hypothyroidism Mother     Diabetes Father     Gout Father     Heart failure Father     Heart disease Father     Hypoglycemic Brother     Cancer Maternal Grandfather      Social History     Tobacco Use    Smoking status: Never Smoker    Smokeless tobacco: Never Used   Substance Use Topics    Alcohol use: No    Drug use: No     Review of Systems   Constitutional: Negative for chills, diaphoresis and fever.   HENT: Negative for congestion, rhinorrhea, sinus pain and sore throat.    Eyes: Negative for visual disturbance. "   Respiratory: Negative for cough and shortness of breath.    Cardiovascular: Negative for chest pain and palpitations.   Gastrointestinal: Negative for abdominal pain, nausea and vomiting.   Genitourinary: Negative for difficulty urinating, flank pain, frequency and hematuria.   Musculoskeletal: Negative for arthralgias, gait problem, myalgias and neck pain.   Skin: Negative.  Negative for pallor, rash and wound.   Neurological: Positive for numbness. Negative for dizziness, syncope, weakness and headaches.       Physical Exam     Initial Vitals [04/18/22 1433]   BP Pulse Resp Temp SpO2   (!) 144/88 92 18 98.1 °F (36.7 °C) 99 %      MAP       --         Physical Exam    Vitals reviewed.  Constitutional: He appears well-developed and well-nourished. He is not diaphoretic. No distress.   HENT:   Head: Normocephalic and atraumatic.   Right Ear: External ear normal.   Left Ear: External ear normal.   Nose: Nose normal.   Mouth/Throat: Oropharynx is clear and moist.   Eyes: Conjunctivae and EOM are normal. Pupils are equal, round, and reactive to light.   Neck: Neck supple. No JVD present.   Normal range of motion.  Cardiovascular: Normal rate, regular rhythm, normal heart sounds and intact distal pulses. Exam reveals no gallop and no friction rub.    No murmur heard.  Pulmonary/Chest: Breath sounds normal. No respiratory distress. He has no wheezes. He has no rhonchi. He has no rales. He exhibits no tenderness.   Abdominal: Abdomen is soft. Bowel sounds are normal. He exhibits no distension. There is no abdominal tenderness. There is no rebound and no guarding.   Musculoskeletal:         General: No tenderness or edema. Normal range of motion.      Cervical back: Normal range of motion and neck supple.     Lymphadenopathy:     He has no cervical adenopathy.   Neurological: He is alert and oriented to person, place, and time. He has normal strength. No cranial nerve deficit or sensory deficit. GCS score is 15. GCS eye  subscore is 4. GCS verbal subscore is 5. GCS motor subscore is 6.   Skin: Skin is warm and dry. Capillary refill takes less than 2 seconds. No rash noted. No erythema. No pallor.   Psychiatric: He has a normal mood and affect. His behavior is normal. Judgment and thought content normal.         ED Course   Procedures  Labs Reviewed   COMPREHENSIVE METABOLIC PANEL - Abnormal; Notable for the following components:       Result Value    CO2 22 (*)     Alkaline Phosphatase 182 (*)     ALT 57 (*)     All other components within normal limits   CBC W/ AUTO DIFFERENTIAL   MAGNESIUM   URINALYSIS, REFLEX TO URINE CULTURE   PROTIME-INR   TSH   LIPID PANEL   TROPONIN I   B-TYPE NATRIURETIC PEPTIDE   SARS-COV-2 RNA AMPLIFICATION, QUAL   POCT GLUCOSE MONITORING CONTINUOUS          Imaging Results          CTA Head and Neck (xpd) (Final result)  Result time 04/18/22 16:03:47    Final result by Clemencia Reyes MD (04/18/22 16:03:47)                 Narrative:    EXAMINATION:  CTA HEAD AND NECK (XPD)    CLINICAL INDICATION: Male, 37 years old. TIA, right-sided numbness    TECHNIQUE: Axial CT angiogram of the head and neck was performed with contrast. Multiplanar reformations as well as 3-D volume rendered imaging were reviewed at the workstation. Degree of stenosis was measured utilizing the NASCET method.    CONTRAST: 100 mL Omnipaque 350 mL of IV.    COMPARISON: None    FINDINGS:  CTA brain: The distal vertebral arteries, basilar artery and cavernous portion of the internal carotid arteries are patent.    The anterior cerebral arteries, middle cerebral arteries and posterior cerebral arteries are widely patent without evidence of large vessel occlusion, stenosis, AVM or aneurysm.    Aortic Arch and Great Vessel Origins: Three-vessel aortic arch. The visualized portion of the great vessels are widely patent.  Subclavian Arteries: Widely patent.  Right Common Carotid Artery: Widely patent.  Right Internal Carotid Artery: Widely  patent.  Right External Carotid Artery: Widely patent.  Left Common Carotid Artery: Widely patent.  Left Internal Carotid Artery: Widely patent.  Left External Carotid Artery: Widely patent.  Right Vertebral Artery: Widely patent.  Left Vertebral Artery: Widely patent.    Dural Venous Sinuses: No evidence of thrombosis or occlusion.  There is mucosal thickening in the right maxillary sinus.    Neck Soft Tissues: Paraspinous soft tissues are normal. There are no acute osseous abnormalities. The visualized portion of the lung apices are clear.    IMPRESSION:  Negative CTA of the head and neck.    This exam was performed according to our departmental dose-optimization program which includes automated exposure control, adjustment of the mA and/or kV according to patient size and/or use of iterative reconstruction technique.    Electronically signed by:  Clemencai Reyes MD  4/18/2022 4:03 PM CDT Workstation: [id number]                             CT Head Without Contrast (Final result)  Result time 04/18/22 15:54:25    Final result by Iván Anthony MD (04/18/22 15:54:25)                 Narrative:    CMS MANDATED QUALITY DATA - CT RADIATION 436    All CT scans at this facility utilize dose modulation, iterative reconstruction, and/or weight based dosing when appropriate to reduce radiation dose to as low as reasonably achievable.    CLINICAL HISTORY:  37 years (1985) Male Right-sided numbness    TECHNIQUE:  CT HEAD WITHOUT IV CONTRAST. 121 images obtained. Axial CT of the brain without contrast using soft tissue and bone algorithm. Please note in the acute setting if there is a clinical concern for an acute stroke MRI would be more sensitive/specific for evaluation of ischemia.    COMPARISON:  None available.    FINDINGS:  Small linear hyperattenuating blush within a sulcus of the left temporal lobe (image 29), possibly an irregular vessel but suspicious for a tiny subarachnoid bleed.    There is no  hydrocephalus, herniation or midline shift and the basal/suprasellar cisterns are patent. No acute osseous abnormality is identified.    The ventricles and sulci are normal. There is normal gray white differentiation.  Orbital contents appear within normal limits. There is soft tissue attenuation within the left external auditory canal most consistent with cerumen (consider correlation with direct visualization).    The visualized paranasal sinuses show mucosal thickening in the right greater than left maxillary sinus, mid ethmoid air cells, anterior sphenoid and frontal ethmoid recesses. There is heterogeneous foamy debris inferiorly on the right. The visualized mastoid air cells are clear.    IMPRESSION:  1. Findings suspicious for a tiny subarachnoid bleed in a sulcus of the left temporal lobe. Consider correlation with either short-term follow-up or a contrast-enhanced MRI of the brain.  2. No hydrocephalus, herniation or midline shift.  3. Mucoperiosteal paranasal sinus disease most pronounced in the right maxillary sinus, consider correlation for acute sinusitis.          RESULT NOTIFICATION: These observations were discussed by the dictating physician, by phone and acknowledged by the ordering provider BRI RODRIGUEZ JR at 4/18/2022 3:50 PM CDT            .    Electronically signed by:  Iván Anthony MD  4/18/2022 3:54 PM CDT Workstation: [id number]                             X-Ray Chest AP Portable (Final result)  Result time 04/18/22 15:08:45    Final result by Clemencia Reyes MD (04/18/22 15:08:45)                 Narrative:    XR CHEST 1 VIEW    CLINICAL HISTORY:  37 years Male TIA    COMPARISON: None    FINDINGS: Cardiomediastinal silhouette is within normal limits. Lungs are normally expanded with no airspace consolidation. No pleural effusion or pneumothorax. No acute osseous abnormality.    IMPRESSION: No acute pulmonary process.    Electronically signed by:  Clemencia Reyes MD  4/18/2022  3:08 PM CDT Workstation: [id number]                               Medications   iohexoL (OMNIPAQUE 350) injection 100 mL (100 mLs Intravenous Given 4/18/22 1540)                Attending Attestation:             Attending ED Notes:   The patient's presentation with transient right-sided numbness, has had his plain CT scan of the head read by Dr. Duggan, radiologist as having a tiny subarachnoid bleed in left temporal lobe.  The patient is pending CTA and will also require an MRI of the brain with contrast.  At this time care is transitioned to Dr. Solorzano.    This patient's care was delayed secondary to nursing miscommunication.      ED Course as of 04/18/22 1606   Mon Apr 18, 2022   1544 Comprehensive metabolic panel [LG]      ED Course User Index  [LG] Pete Rodarte Jr., MD             Clinical Impression:   Final diagnoses:  [G45.9] TIA (transient ischemic attack)  [I63.9] Stroke                 Pete Rodarte Jr., MD  04/18/22 1606

## 2022-04-18 NOTE — H&P
[facility] History & Physical Examination   Patient Name: Iván Amaral  MRN: [id number]  Patient Class: Emergency   Admission Date: 4/18/2022  2:40 PM  Length of Stay: 0  Attending Physician:   Primary Care Provider: Hugo Moran MD  Face-to-Face encounter date: 04/18/2022  Code Status: Full Code  MPOA:  Chief Complaint: Numbness (Loss function to r hand and numbness-also numbness to r side of face and body)        Patient information was obtained from patient, past medical records and ER records.   HISTORY OF PRESENT ILLNESS:   Iván Amaral is a 37 y.o. old male who  has a past medical history of Gout (2019), Hyperlipidemia, and Stomach ulcer.. The patient presented to [facility] on 4/18/2022 with a primary complaint of Numbness (Loss function to r hand and numbness-also numbness to r side of face and body)  .   37 year old  male presents to the emergency room with complaints right-sided weakness    The patient states that around 2 p.m. today while he was sitting he had onset of numbness and tingling to his right hand and was some associated right hand clumsiness while he was typing.  The patient states he got up and walked around and he noted right leg weakness and right-sided facial weakness.  He denied any headache no blurry vision no slurred speech no falling he denied any recent head trauma.  There was no nausea vomiting or diarrhea.      He describes his symptoms as moderate to severe severity with no alleviating or exacerbating factors    Past medical history significant hyperlipidemia, gout and stomach ulcers    In the emergency room a CT of the head was performed revealing a possible small subarachnoid hemorrhage    Neurosurgery and Neurology was consulted.    The patient subsequently had MRI of his brain suggesting no subarachnoid hemorrhage hemorrhage    On my exam the patient's NIH scale was within normal limits he had no neural  vascular deficits his speech was clear and coherent and he denied any pain    I discussed this case with my attending Dr. Travis Chow and the patient will be admitted to the ICU for further management.  Neurology and neurosurgery is aware of the patient and agreed to see him in consult    REVIEW OF SYSTEMS:   10 Point Review of System was performed and was found to be negative except for that mentioned already in the HPI and   Review of Systems (Negative unless checked off)  Review of Systems   Constitutional: Negative.    HENT: Negative.    Eyes: Negative.    Respiratory: Negative.    Cardiovascular: Negative.    Gastrointestinal: Negative.    Genitourinary: Negative.    Musculoskeletal: Negative.    Skin: Negative.    Neurological: Positive for tingling and focal weakness.   Psychiatric/Behavioral: Negative.            PAST MEDICAL HISTORY:     Past Medical History:   Diagnosis Date    Gout 2019    Hyperlipidemia     Stomach ulcer        PAST SURGICAL HISTORY:     Past Surgical History:   Procedure Laterality Date    NASAL SEPTUM SURGERY         ALLERGIES:   Pcn [penicillins]    FAMILY HISTORY:     Family History   Problem Relation Age of Onset    Diabetes Mother     Hypothyroidism Mother     Diabetes Father     Gout Father     Heart failure Father     Heart disease Father     Hypoglycemic Brother     Cancer Maternal Grandfather        SOCIAL HISTORY:     Social History     Tobacco Use    Smoking status: Never Smoker    Smokeless tobacco: Never Used   Substance Use Topics    Alcohol use: No        Social History     Substance and Sexual Activity   Sexual Activity Not Currently        HOME MEDICATIONS:     Prior to Admission medications    Medication Sig Start Date End Date Taking? Authorizing Provider   allopurinoL (ZYLOPRIM) 300 MG tablet Take 1 tablet (300 mg total) by mouth once daily. 1/14/22  Yes Hugo Moran MD   benzonatate (TESSALON) 100 MG capsule Take 100 mg by mouth 3 (three) times daily as needed  for Cough.   Yes Historical Provider   buPROPion (WELLBUTRIN SR) 150 MG TBSR 12 hr tablet Take 1 tablet (150 mg total) by mouth 2 (two) times daily. 1/14/22 1/14/23 Yes Hugo Moran MD   fluticasone propionate (FLONASE) 50 mcg/actuation nasal spray 1 spray (50 mcg total) by Each Nostril route once daily. 3/22/22  Yes Hugo Moran MD   pravastatin (PRAVACHOL) 10 MG tablet Take 10 mg by mouth once daily.   Yes Historical Provider   methylPREDNISolone (MEDROL DOSEPACK) 4 mg tablet use as directed 3/22/22   Hugo Moran MD   multivitamin (THERAGRAN) per tablet Take 1 tablet by mouth once daily.    Historical Provider   pravastatin (PRAVACHOL) 20 MG tablet Take 1 tablet (20 mg total) by mouth once daily. 1/18/22 1/18/23  Hugo Moran MD         PHYSICAL EXAM:   BP (!) 150/98   Pulse 98   Temp 98.1 °F (36.7 °C) (Oral)   Resp (!) 35   Wt 97.5 kg (215 lb)   SpO2 (!) 94%   BMI 29.99 kg/m²   Vitals Reviewed  General appearance: Well-developed, well-nourished male in no apparent distress.  Skin: No Rash.   Neuro: Motor and sensory exams grossly intact. Good tone. Power in all 4 extremities 5/5.   HENT: Atraumatic head. Moist mucous membranes of oral cavity.  Eyes: Normal extraocular movements.   Neck: Supple. No evidence of lymphadenopathy. No thyroidomegaly.  Lungs: Clear to auscultation bilaterally. No wheezing present.   Heart: Regular rate and rhythm. S1 and S2 present with no murmurs/gallop/rub. No pedal edema. No JVD present.   Abdomen: Soft, non-distended, non-tender. No rebound tenderness/guarding. No masses or organomegaly. Bowel sounds are normal. Bladder is not palpable.   Extremities: No cyanosis, clubbing, or edema.  Psych/mental status: Alert and oriented. Cooperative. Responds appropriately to questions.   EMERGENCY DEPARTMENT LABS AND IMAGING:   Following labs were Reviewed   Recent Labs   Lab 04/18/22  1445   WBC 5.63   HGB 16.2   HCT 46.0      CALCIUM 9.6   ALBUMIN 4.5   PROT 8.2       K 3.9   CO2 22*      BUN 15   CREATININE 1.3   ALKPHOS 182*   ALT 57*   AST 27   BILITOT 0.4         BMP:   Recent Labs   Lab 04/18/22  1445         K 3.9      CO2 22*   BUN 15   CREATININE 1.3   CALCIUM 9.6   MG 2.3   , CMP   Recent Labs   Lab 04/18/22  1445      K 3.9      CO2 22*      BUN 15   CREATININE 1.3   CALCIUM 9.6   PROT 8.2   ALBUMIN 4.5   BILITOT 0.4   ALKPHOS 182*   AST 27   ALT 57*   ANIONGAP 13   ESTGFRAFRICA >60.0   EGFRNONAA >60.0   , CBC   Recent Labs   Lab 04/18/22  1445   WBC 5.63   HGB 16.2   HCT 46.0      , INR   Lab Results   Component Value Date    INR 1.0 04/18/2022   , Lipid Panel   Lab Results   Component Value Date    CHOL 189 04/18/2022    HDL 27 (L) 04/18/2022    LDLCALC 106.0 04/18/2022    TRIG 280 (H) 04/18/2022    CHOLHDL 14.3 (L) 04/18/2022   , Troponin   Recent Labs   Lab 04/18/22  1608   TROPONINI <0.030   , A1C: No results for input(s): HGBA1C in the last 4320 hours. and All labs within the past 24 hours have been reviewed  Microbiology Results (last 7 days)       ** No results found for the last 168 hours. **          CTA Head and Neck (xpd)   Final Result      CT Head Without Contrast   Final Result      X-Ray Chest AP Portable   Final Result      MRI Brain W WO Contrast    (Results Pending)     CT Head Without Contrast    Result Date: 4/18/2022  CMS MANDATED QUALITY DATA - CT RADIATION 436 All CT scans at this facility utilize dose modulation, iterative reconstruction, and/or weight based dosing when appropriate to reduce radiation dose to as low as reasonably achievable. CLINICAL HISTORY: 37 years (1985) Male Right-sided numbness TECHNIQUE: CT HEAD WITHOUT IV CONTRAST. 121 images obtained. Axial CT of the brain without contrast using soft tissue and bone algorithm. Please note in the acute setting if there is a clinical concern for an acute stroke MRI would be more sensitive/specific for evaluation of  ischemia. COMPARISON: None available. FINDINGS: Small linear hyperattenuating blush within a sulcus of the left temporal lobe (image 29), possibly an irregular vessel but suspicious for a tiny subarachnoid bleed. There is no hydrocephalus, herniation or midline shift and the basal/suprasellar cisterns are patent. No acute osseous abnormality is identified. The ventricles and sulci are normal. There is normal gray white differentiation.  Orbital contents appear within normal limits. There is soft tissue attenuation within the left external auditory canal most consistent with cerumen (consider correlation with direct visualization). The visualized paranasal sinuses show mucosal thickening in the right greater than left maxillary sinus, mid ethmoid air cells, anterior sphenoid and frontal ethmoid recesses. There is heterogeneous foamy debris inferiorly on the right. The visualized mastoid air cells are clear. IMPRESSION: 1. Findings suspicious for a tiny subarachnoid bleed in a sulcus of the left temporal lobe. Consider correlation with either short-term follow-up or a contrast-enhanced MRI of the brain. 2. No hydrocephalus, herniation or midline shift. 3. Mucoperiosteal paranasal sinus disease most pronounced in the right maxillary sinus, consider correlation for acute sinusitis. RESULT NOTIFICATION: These observations were discussed by the dictating physician, by phone and acknowledged by the ordering provider BRI RODRIGUEZ JR at 4/18/2022 3:50 PM CDT . Electronically signed by:  Iván Anthony MD  4/18/2022 3:54 PM CDT Workstation: [id number]    MRI Brain W WO Contrast    Result Date: 4/18/2022  MRI of the brain with and without contrast Clinical history is numbness, TIA questionable small subarachnoid hemorrhage seen on CT brain 1534 hours 9.5 mL Gadavist IV contrast The ventricles and sulci are normal in size and configuration for age. There is no hemorrhage, mass or midline shift. There are no extra-axial  fluid collections. There is no evidence of subarachnoid hemorrhage in the left temporal region. There is normal signal within the white matter on all sequences. There is no abnormality on the diffusion-weighted images to suggest acute infarct. . The cerebellum and brainstem are normal. There is no pathologic enhancement. There are flow voids within the cavernous portions of the internal carotid arteries and basilar artery indicating patency. The corpus callosum, cerebellar tonsils, midline structures and orbits are normal. The paranasal sinuses and mastoid air cells are clear. IMPRESSION: Negative MRI of the brain No evidence of subarachnoid hemorrhage within the left temporal region No pathologic enhancement. Electronically signed by:  Clemencia Reyes MD  4/18/2022 6:15 PM CDT Workstation: [id number]    X-Ray Chest AP Portable    Result Date: 4/18/2022  XR CHEST 1 VIEW CLINICAL HISTORY: 37 years Male TIA COMPARISON: None FINDINGS: Cardiomediastinal silhouette is within normal limits. Lungs are normally expanded with no airspace consolidation. No pleural effusion or pneumothorax. No acute osseous abnormality. IMPRESSION: No acute pulmonary process. Electronically signed by:  Clemencia Reyes MD  4/18/2022 3:08 PM CDT Workstation: [id number]    CTA Head and Neck (xpd)    Result Date: 4/18/2022  EXAMINATION: CTA HEAD AND NECK (XPD) CLINICAL INDICATION: Male, 37 years old. TIA, right-sided numbness TECHNIQUE: Axial CT angiogram of the head and neck was performed with contrast. Multiplanar reformations as well as 3-D volume rendered imaging were reviewed at the workstation. Degree of stenosis was measured utilizing the NASCET method. CONTRAST: 100 mL Omnipaque 350 mL of IV. COMPARISON: None FINDINGS: CTA brain: The distal vertebral arteries, basilar artery and cavernous portion of the internal carotid arteries are patent. The anterior cerebral arteries, middle cerebral arteries and posterior cerebral arteries are  widely patent without evidence of large vessel occlusion, stenosis, AVM or aneurysm. Aortic Arch and Great Vessel Origins: Three-vessel aortic arch. The visualized portion of the great vessels are widely patent. Subclavian Arteries: Widely patent. Right Common Carotid Artery: Widely patent. Right Internal Carotid Artery: Widely patent. Right External Carotid Artery: Widely patent. Left Common Carotid Artery: Widely patent. Left Internal Carotid Artery: Widely patent. Left External Carotid Artery: Widely patent. Right Vertebral Artery: Widely patent. Left Vertebral Artery: Widely patent. Dural Venous Sinuses: No evidence of thrombosis or occlusion. There is mucosal thickening in the right maxillary sinus. Neck Soft Tissues: Paraspinous soft tissues are normal. There are no acute osseous abnormalities. The visualized portion of the lung apices are clear. IMPRESSION: Negative CTA of the head and neck. This exam was performed according to our departmental dose-optimization program which includes automated exposure control, adjustment of the mA and/or kV according to patient size and/or use of iterative reconstruction technique. Electronically signed by:  Clemencia Reyes MD  4/18/2022 4:03 PM CDT Workstation: [id number]      I personally reviewed radiographic imaging    12 lead EKG reveals a normal sinus rhythm with a normal axis this good R-wave progression this no ST or T-wave abnormalities rate 79 QTc:  424 millisecond  ASSESSMENT & PLAN:   Iván Amaral is a 37 y.o. male admitted for    1. Possible small subarachnoid bleed  -neurochecks q.1 hour  - Q1 hour vital signs  -keep head of bed elevated at all times 30 degress  -close blood pressure monitoring  -neuro surgery Dr. Rizzo following  -neurology  -PT OT ST eval and treat  - Seizure precautions  -NPO for now    2. Hyperlipidemia  -continue statin          DVT Prophylaxis: will be placed on SCDs for DVT prophylaxis and will be advised to be as mobile as  possible and sit in a chair as tolerated.   _____________________________________________________________  Face-to-Face encounter date: 04/18/2022  Encounter included review of the medical records, interviewing and examining the patient face-to-face, discussion with family and other health care providers including emergency medicine physician, admission orders, interpreting lab/test results and formulating a plan of care.   Medical Decision Making during this encounter was  [_] Low Complexity  [_] Moderate Complexity  [x] High Complexity  _________________________________________________________________________________    INPATIENT LIST OF MEDICATIONS   No current facility-administered medications for this encounter.    Current Outpatient Medications:     allopurinoL (ZYLOPRIM) 300 MG tablet, Take 1 tablet (300 mg total) by mouth once daily., Disp: 90 tablet, Rfl: 3    benzonatate (TESSALON) 100 MG capsule, Take 100 mg by mouth 3 (three) times daily as needed for Cough., Disp: , Rfl:     buPROPion (WELLBUTRIN SR) 150 MG TBSR 12 hr tablet, Take 1 tablet (150 mg total) by mouth 2 (two) times daily., Disp: 60 tablet, Rfl: 11    fluticasone propionate (FLONASE) 50 mcg/actuation nasal spray, 1 spray (50 mcg total) by Each Nostril route once daily., Disp: 16 g, Rfl: 3    pravastatin (PRAVACHOL) 10 MG tablet, Take 10 mg by mouth once daily., Disp: , Rfl:     methylPREDNISolone (MEDROL DOSEPACK) 4 mg tablet, use as directed, Disp: 1 tablet, Rfl: 0    multivitamin (THERAGRAN) per tablet, Take 1 tablet by mouth once daily., Disp: , Rfl:     pravastatin (PRAVACHOL) 20 MG tablet, Take 1 tablet (20 mg total) by mouth once daily., Disp: 90 tablet, Rfl: 3      Scheduled Meds:  Continuous Infusions:  PRN Meds:.      Gaby Ochoa  [facility] Hospitalist NP  04/18/2022

## 2022-04-18 NOTE — ED PROVIDER NOTES
Assumed care of patient at 4:00 p.m., patient has findings concerning for subarachnoid hemorrhage, MRI of brain ordered, case discussed with Neurology and Neurosurgery, no acute interventions advised at this time they will follow case.  Patient consulted to Internal Medicine for admission.  Patient is not a candidate for tPA given brain bleeding.  Patient reassessed and repeat neurologic exam is within normal limits, patient vital signs are stable blood pressure currently 132/78 will continue to monitor closely.    Patient seen and evaluated by Internal Medicine and will be admitted to ICU.            Erik Solorzano MD  04/18/22 [id number]

## 2022-04-19 ENCOUNTER — CLINICAL SUPPORT (OUTPATIENT)
Dept: CARDIOLOGY | Facility: HOSPITAL | Age: 37
DRG: 069 | End: 2022-04-19
Payer: COMMERCIAL

## 2022-04-19 VITALS
SYSTOLIC BLOOD PRESSURE: 150 MMHG | BODY MASS INDEX: 34.15 KG/M2 | WEIGHT: 225.31 LBS | HEART RATE: 84 BPM | HEIGHT: 68 IN | TEMPERATURE: 99 F | OXYGEN SATURATION: 94 % | DIASTOLIC BLOOD PRESSURE: 95 MMHG | RESPIRATION RATE: 20 BRPM

## 2022-04-19 VITALS — WEIGHT: 225 LBS | BODY MASS INDEX: 34.1 KG/M2 | HEIGHT: 68 IN

## 2022-04-19 PROBLEM — G45.9 TIA (TRANSIENT ISCHEMIC ATTACK): Status: ACTIVE | Noted: 2022-04-19

## 2022-04-19 LAB
ABO + RH BLD: NORMAL
ALBUMIN SERPL BCP-MCNC: 3.9 G/DL (ref 3.5–5.2)
ALP SERPL-CCNC: 147 U/L (ref 55–135)
ALT SERPL W/O P-5'-P-CCNC: 46 U/L (ref 10–44)
ANION GAP SERPL CALC-SCNC: 9 MMOL/L (ref 8–16)
APTT PPP: 28.5 SEC (ref 23.3–35.1)
AST SERPL-CCNC: 24 U/L (ref 10–40)
AV INDEX (PROSTH): 0.97
AV MEAN GRADIENT: 5 MMHG
AV VALVE AREA: 3.17 CM2
BASOPHILS # BLD AUTO: 0.03 K/UL (ref 0–0.2)
BASOPHILS NFR BLD: 0.5 % (ref 0–1.9)
BILIRUB SERPL-MCNC: 0.6 MG/DL (ref 0.1–1)
BLD GP AB SCN CELLS X3 SERPL QL: NORMAL
BSA FOR ECHO PROCEDURE: 2.21 M2
BUN SERPL-MCNC: 13 MG/DL (ref 6–20)
CALCIUM SERPL-MCNC: 8.3 MG/DL (ref 8.7–10.5)
CHLORIDE SERPL-SCNC: 101 MMOL/L (ref 95–110)
CHOLEST SERPL-MCNC: 180 MG/DL (ref 120–199)
CHOLEST/HDLC SERPL: 6.9 {RATIO} (ref 2–5)
CO2 SERPL-SCNC: 24 MMOL/L (ref 23–29)
CREAT SERPL-MCNC: 1.1 MG/DL (ref 0.5–1.4)
CV ECHO LV RWT: 0.63 CM
DIFFERENTIAL METHOD: NORMAL
DOP CALC AO VTI: 24.52 CM
DOP CALC LVOT AREA: 3.3 CM2
DOP CALC LVOT DIAMETER: 2.04 CM
DOP CALC LVOT PEAK VEL: 136.89 M/S
DOP CALC LVOT STROKE VOLUME: 77.69 CM3
DOP CALCLVOT PEAK VEL VTI: 23.78 CM
E WAVE DECELERATION TIME: 219.63 MSEC
E/A RATIO: 1.33
E/E' RATIO: 7.65 M/S
ECHO LV POSTERIOR WALL: 1.29 CM (ref 0.6–1.1)
EJECTION FRACTION: 65 %
EOSINOPHIL # BLD AUTO: 0.4 K/UL (ref 0–0.5)
EOSINOPHIL NFR BLD: 6.4 % (ref 0–8)
ERYTHROCYTE [DISTWIDTH] IN BLOOD BY AUTOMATED COUNT: 12.8 % (ref 11.5–14.5)
EST. GFR  (AFRICAN AMERICAN): >60 ML/MIN/1.73 M^2
EST. GFR  (NON AFRICAN AMERICAN): >60 ML/MIN/1.73 M^2
FRACTIONAL SHORTENING: 36 % (ref 28–44)
GLUCOSE SERPL-MCNC: 103 MG/DL (ref 70–110)
HCT VFR BLD AUTO: 42.1 % (ref 40–54)
HDLC SERPL-MCNC: 26 MG/DL (ref 40–75)
HDLC SERPL: 14.4 % (ref 20–50)
HGB BLD-MCNC: 14.9 G/DL (ref 14–18)
IMM GRANULOCYTES # BLD AUTO: 0.02 K/UL (ref 0–0.04)
IMM GRANULOCYTES NFR BLD AUTO: 0.4 % (ref 0–0.5)
INR PPP: 1.1
INTERVENTRICULAR SEPTUM: 1.15 CM (ref 0.6–1.1)
LDLC SERPL CALC-MCNC: 111.6 MG/DL (ref 63–159)
LEFT ATRIUM SIZE: 4.11 CM
LEFT INTERNAL DIMENSION IN SYSTOLE: 2.63 CM (ref 2.1–4)
LEFT VENTRICLE MASS INDEX: 82 G/M2
LEFT VENTRICULAR INTERNAL DIMENSION IN DIASTOLE: 4.12 CM (ref 3.5–6)
LEFT VENTRICULAR MASS: 177.29 G
LV LATERAL E/E' RATIO: 7.33 M/S
LV SEPTAL E/E' RATIO: 8 M/S
LYMPHOCYTES # BLD AUTO: 2.2 K/UL (ref 1–4.8)
LYMPHOCYTES NFR BLD: 39.9 % (ref 18–48)
MAGNESIUM SERPL-MCNC: 2.3 MG/DL (ref 1.6–2.6)
MCH RBC QN AUTO: 29.5 PG (ref 27–31)
MCHC RBC AUTO-ENTMCNC: 35.4 G/DL (ref 32–36)
MCV RBC AUTO: 83 FL (ref 82–98)
MONOCYTES # BLD AUTO: 0.4 K/UL (ref 0.3–1)
MONOCYTES NFR BLD: 7.9 % (ref 4–15)
MV PEAK A VEL: 0.66 M/S
MV PEAK E VEL: 0.88 M/S
NEUTROPHILS # BLD AUTO: 2.5 K/UL (ref 1.8–7.7)
NEUTROPHILS NFR BLD: 44.9 % (ref 38–73)
NONHDLC SERPL-MCNC: 154 MG/DL
NRBC BLD-RTO: 0 /100 WBC
PHOSPHATE SERPL-MCNC: 3.7 MG/DL (ref 2.7–4.5)
PISA TR MAX VEL: 1.45 M/S
PLATELET # BLD AUTO: 308 K/UL (ref 150–450)
PMV BLD AUTO: 9.6 FL (ref 9.2–12.9)
POTASSIUM SERPL-SCNC: 3.9 MMOL/L (ref 3.5–5.1)
PROT SERPL-MCNC: 7.2 G/DL (ref 6–8.4)
PROTHROMBIN TIME: 13.3 SEC (ref 11.4–13.7)
RA PRESSURE: 3 MMHG
RBC # BLD AUTO: 5.05 M/UL (ref 4.6–6.2)
RIGHT VENTRICULAR END-DIASTOLIC DIMENSION: 249 CM
SODIUM SERPL-SCNC: 134 MMOL/L (ref 136–145)
TDI LATERAL: 0.12 M/S
TDI SEPTAL: 0.11 M/S
TDI: 0.12 M/S
TR MAX PG: 8 MMHG
TRICUSPID ANNULAR PLANE SYSTOLIC EXCURSION: 264 CM
TRIGL SERPL-MCNC: 212 MG/DL (ref 30–150)
TROPONIN I SERPL DL<=0.01 NG/ML-MCNC: <0.03 NG/ML
TSH SERPL DL<=0.005 MIU/L-ACNC: 3.27 UIU/ML (ref 0.34–5.6)
TV REST PULMONARY ARTERY PRESSURE: 11 MMHG
WBC # BLD AUTO: 5.47 K/UL (ref 3.9–12.7)

## 2022-04-19 PROCEDURE — 84100 ASSAY OF PHOSPHORUS: CPT | Performed by: NURSE PRACTITIONER

## 2022-04-19 PROCEDURE — 85610 PROTHROMBIN TIME: CPT | Performed by: NURSE PRACTITIONER

## 2022-04-19 PROCEDURE — 25000003 PHARM REV CODE 250

## 2022-04-19 PROCEDURE — 94761 N-INVAS EAR/PLS OXIMETRY MLT: CPT

## 2022-04-19 PROCEDURE — 85025 COMPLETE CBC W/AUTO DIFF WBC: CPT | Performed by: NURSE PRACTITIONER

## 2022-04-19 PROCEDURE — 85730 THROMBOPLASTIN TIME PARTIAL: CPT | Performed by: NURSE PRACTITIONER

## 2022-04-19 PROCEDURE — 80061 LIPID PANEL: CPT | Performed by: NURSE PRACTITIONER

## 2022-04-19 PROCEDURE — 25000003 PHARM REV CODE 250: Performed by: NURSE PRACTITIONER

## 2022-04-19 PROCEDURE — 80053 COMPREHEN METABOLIC PANEL: CPT | Performed by: NURSE PRACTITIONER

## 2022-04-19 PROCEDURE — 93306 TTE W/DOPPLER COMPLETE: CPT

## 2022-04-19 PROCEDURE — 84443 ASSAY THYROID STIM HORMONE: CPT | Performed by: NURSE PRACTITIONER

## 2022-04-19 PROCEDURE — 36415 COLL VENOUS BLD VENIPUNCTURE: CPT | Performed by: NURSE PRACTITIONER

## 2022-04-19 PROCEDURE — 93306 TTE W/DOPPLER COMPLETE: CPT | Mod: 26,,, | Performed by: SPECIALIST

## 2022-04-19 PROCEDURE — 83735 ASSAY OF MAGNESIUM: CPT | Performed by: NURSE PRACTITIONER

## 2022-04-19 PROCEDURE — 93306 ECHO (CUPID ONLY): ICD-10-PCS | Mod: 26,,, | Performed by: SPECIALIST

## 2022-04-19 PROCEDURE — 99900035 HC TECH TIME PER 15 MIN (STAT)

## 2022-04-19 PROCEDURE — 84484 ASSAY OF TROPONIN QUANT: CPT | Performed by: NURSE PRACTITIONER

## 2022-04-19 PROCEDURE — 86901 BLOOD TYPING SEROLOGIC RH(D): CPT | Performed by: NURSE PRACTITIONER

## 2022-04-19 RX ORDER — ASPIRIN 81 MG/1
81 TABLET ORAL DAILY
Status: DISCONTINUED | OUTPATIENT
Start: 2022-04-19 | End: 2022-04-19 | Stop reason: HOSPADM

## 2022-04-19 RX ORDER — MUPIROCIN 20 MG/G
OINTMENT TOPICAL 2 TIMES DAILY
Status: DISCONTINUED | OUTPATIENT
Start: 2022-04-19 | End: 2022-04-19 | Stop reason: HOSPADM

## 2022-04-19 RX ORDER — ATORVASTATIN CALCIUM 40 MG/1
40 TABLET, FILM COATED ORAL DAILY
Qty: 90 TABLET | Refills: 1 | Status: SHIPPED | OUTPATIENT
Start: 2022-04-20 | End: 2022-11-23 | Stop reason: SDUPTHER

## 2022-04-19 RX ORDER — ASPIRIN 81 MG/1
81 TABLET ORAL DAILY
Qty: 90 TABLET | Refills: 1 | Status: ON HOLD | OUTPATIENT
Start: 2022-04-19 | End: 2022-04-29 | Stop reason: HOSPADM

## 2022-04-19 RX ORDER — CHLORHEXIDINE GLUCONATE ORAL RINSE 1.2 MG/ML
15 SOLUTION DENTAL 2 TIMES DAILY
Status: DISCONTINUED | OUTPATIENT
Start: 2022-04-19 | End: 2022-04-19 | Stop reason: HOSPADM

## 2022-04-19 RX ADMIN — BUPROPION HYDROCHLORIDE 150 MG: 150 TABLET, EXTENDED RELEASE ORAL at 08:04

## 2022-04-19 RX ADMIN — ATORVASTATIN CALCIUM 40 MG: 40 TABLET, FILM COATED ORAL at 08:04

## 2022-04-19 RX ADMIN — ACETAMINOPHEN 650 MG: 325 TABLET, FILM COATED ORAL at 02:04

## 2022-04-19 RX ADMIN — CHLORHEXIDINE GLUCONATE 15 ML: 1.2 RINSE ORAL at 08:04

## 2022-04-19 RX ADMIN — MUPIROCIN: 20 OINTMENT TOPICAL at 08:04

## 2022-04-19 NOTE — PLAN OF CARE
[facility]  Initial Discharge Assessment       Primary Care Provider: Hugo Moran MD    Admission Diagnosis: Subarachnoid hemorrhage [I60.9]    Admission Date: 4/18/2022  Expected Discharge Date:     Discharge Barriers Identified: None     Assessment completed at bedside with patient.  Advanced directives not addressed at this time.  Patient intends to discharge home with no needs identified at this time.    Payor: BLUE CROSS BLUE SHIELD / Plan: [facility] [employer] EMPLOYEE BLUE CROSS [place] / Product Type: Self Funded /     Extended Emergency Contact Information  Primary Emergency Contact: Luis Kaplan  Address: [address] [place] [address] [facility]  Home Phone: [phone]  Mobile Phone: [phone]  Relation: Roommate  Secondary Emergency Contact: YemiBassam  Address: [address] [place] [address] [facility]  Home Phone: [phone]  Mobile Phone: [phone]  Relation: Roommate    Discharge Plan A: Home  Discharge Plan B: Home with family      [facility] - [facility] [address] [facility] [address]  [place] [address]  Phone: [phone] Fax: [phone]    [facility] - [place], [place] - [address] [place] [address]  Phone: [phone] Fax: [phone]    [id number] [address] & [facility]  [address] [place] [address]  Phone: [phone] Fax: [phone]      Initial Assessment (most recent)     Adult Discharge Assessment - 04/19/22 0948        Discharge Assessment    Assessment Type Discharge Planning Assessment     Confirmed/corrected address, phone number and insurance Yes     Confirmed Demographics Correct on Facesheet     Source of Information patient     When was your last doctors appointment? --   month ago    Communicated STANISLAW with patient/caregiver Date not available/Unable to determine     Reason For  Admission head bleed     Lives With friend(s)     Facility Arrived From: home     Do you expect to return to your current living situation? Yes     Do you have help at home or someone to help you manage your care at home? Yes     Who are your caregiver(s) and their phone number(s)? room mates on facesheet     Prior to hospitilization cognitive status: Alert/Oriented     Current cognitive status: Alert/Oriented     Walking or Climbing Stairs Difficulty none     Dressing/Bathing Difficulty none     Equipment Currently Used at Home none     Readmission within 30 days? No     Patient currently being followed by outpatient case management? No     Do you currently have service(s) that help you manage your care at home? No     Do you take prescription medications? Yes     Do you have prescription coverage? Yes     Coverage bcbs     Do you have any problems affording any of your prescribed medications? No     Is the patient taking medications as prescribed? yes     Who is going to help you get home at discharge? self     How do you get to doctors appointments? family or friend will provide     Are you on dialysis? No     Do you take coumadin? No     Discharge Plan A Home     Discharge Plan B Home with family     DME Needed Upon Discharge  none     Discharge Plan discussed with: Patient     Discharge Barriers Identified None

## 2022-04-19 NOTE — PLAN OF CARE
04/19/22 1247   Final Note   Assessment Type Final Discharge Note   Anticipated Discharge Disposition Home   What phone number can be called within the next 1-3 days to see how you are doing after discharge? [phone]   Post-Acute Status   Post-Acute Authorization Other   Other Status No Post-Acute Service Needs   Discharge Delays None known at this time

## 2022-04-19 NOTE — HOSPITAL COURSE
The patient was admitted with symptoms of right-sided numbness and tingling spreading from his arm across the right side of his body.  See his symptoms started while at work yesterday and progressively became worse which prompted him to come to the ED. Shortly after arrival in the ER his symptoms began to subside and soon thereafter completely resolved.  He has not had any recurrence of his symptoms since that time.  He has never had any symptoms like this before.  He did not have any other vision changes or any other neurological symptoms.  His CT in the ED was obtained which showed concern for small subarachnoid hemorrhage however on subsequent imaging with MRI this was ruled out.  The neurosurgery consult today shin was canceled.  Dr. Stephenson did see him and felt we should treat as TIA and have him follow-up as an outpatient.  He says if symptoms return then we would perform EEG at that time as well.  Given that his symptoms have totally resolved and MRI ruled out any signs of subarachnoid hemorrhage, will discharge home with plan for close follow-up as an outpatient with primary care physician Dr. Stephenson.

## 2022-04-19 NOTE — DISCHARGE INSTRUCTIONS
Take a baby aspirin daily  Stop Pravastatin and start atorvastatin  Follow up with primary care physician

## 2022-04-19 NOTE — ASSESSMENT & PLAN NOTE
- the patient presented with symptoms as noted in hospital course.  CVA was ruled out.  There was initial concern for possible subarachnoid hemorrhage however this was ruled out with subsequent MRI.  No other imaging abnormalities were noted.  The patient's symptoms resolved shortly after arrival in the ER and had not returned since that time.  He will be started on aspirin and Lipitor and will follow-up his primary care physician and neurologist as an outpatient.

## 2022-04-19 NOTE — CONSULTS
[facility]  Department of Neurology  Neurology Consultation Note        PATIENT NAME: Iván Amaral  MRN: [id number]  CSN: [id number]      TODAY'S DATE: 04/19/2022  ADMIT DATE: 4/18/2022                            CONSULTING PROVIDER: Koko Stephenson MD  CONSULT REQUESTED BY: Chandana Beard MD      Reason for consult: SAH       History obtained from chart review and the patient.    HPI per EMR: Iván Amaral is a 37 y.o. male presents to the emergency room with complaints right-sided weakness. The patient states that around 2 p.m. today while he was sitting he had onset of numbness and tingling to his right hand and was some associated right hand clumsiness while he was typing.  The patient states he got up and walked around and he noted right leg weakness and right-sided facial weakness.  He denied any headache no blurry vision no slurred speech no falling he denied any recent head trauma.  There was no nausea vomiting or diarrhea.       Past medical history significant hyperlipidemia, gout and stomach ulcers     In the emergency room a CT of the head was performed revealing a possible small subarachnoid hemorrhage     Neurosurgery and Neurology was consulted.     The patient subsequently had MRI of his brain suggesting no subarachnoid hemorrhage hemorrhage        Neurology consult:  Patient was seen examined me this morning.  He is alert oriented x3.  He states that his symptoms have improved and is back to baseline.      He states that his symptoms started while he was at work and typing an e-mail.  He started having numbness in the right hand which radiated up to his forearm and arm and then into his right face and right side the body including right lower extremity. He did say that he had difficulty coordinating his right hand and also difficulty typing during this period.   He presented to the ER for these symptoms.  He denies any other associated symptoms including headache,  vision problems, speech deficits, weakness in any extremities.      His symptoms lasted for 20 minutes after which the improved.  He had a CT of the head done in the ER showed concerns for small sulcal subarachnoid hemorrhage in the left temporal.     Patient was admitted to the ICU for further workup and management.    He had an MRI brain done which did not reveal any acute abnormalities including negative for subarachnoid hemorrhage.  On exam, patient had normal neurological exam with no neurological deficits.      PREVIOUS MEDICAL HISTORY:  Past Medical History:   Diagnosis Date    Gout 2019    Hyperlipidemia     Stomach ulcer      PREVIOUS SURGICAL HISTORY:  Past Surgical History:   Procedure Laterality Date    NASAL SEPTUM SURGERY       FAMILY MEDICAL HISTORY:  Family History   Problem Relation Age of Onset    Diabetes Mother     Hypothyroidism Mother     Diabetes Father     Gout Father     Heart failure Father     Heart disease Father     Hypoglycemic Brother     Cancer Maternal Grandfather      SOCIAL HISTORY:  Social History     Tobacco Use    Smoking status: Never Smoker    Smokeless tobacco: Never Used   Substance Use Topics    Alcohol use: No    Drug use: No     ALLERGIES:  Review of patient's allergies indicates:   Allergen Reactions    Pcn [penicillins]      HOME MEDICATIONS:  Prior to Admission medications    Medication Sig Start Date End Date Taking? Authorizing Provider   allopurinoL (ZYLOPRIM) 300 MG tablet Take 1 tablet (300 mg total) by mouth once daily. 1/14/22  Yes Hugo Moran MD   benzonatate (TESSALON) 100 MG capsule Take 100 mg by mouth 3 (three) times daily as needed for Cough.   Yes Historical Provider   buPROPion (WELLBUTRIN SR) 150 MG TBSR 12 hr tablet Take 1 tablet (150 mg total) by mouth 2 (two) times daily. 1/14/22 1/14/23 Yes Hugo Moran MD   fluticasone propionate (FLONASE) 50 mcg/actuation nasal spray 1 spray (50 mcg total) by Each Nostril route once  daily. 3/22/22  Yes Hugo Moran MD   pravastatin (PRAVACHOL) 10 MG tablet Take 10 mg by mouth once daily.   Yes Historical Provider   methylPREDNISolone (MEDROL DOSEPACK) 4 mg tablet use as directed 3/22/22   Hugo Moran MD   multivitamin (THERAGRAN) per tablet Take 1 tablet by mouth once daily.    Historical Provider   pravastatin (PRAVACHOL) 20 MG tablet Take 1 tablet (20 mg total) by mouth once daily. 1/18/22 1/18/23  Hugo Moran MD     CURRENT SCHEDULED MEDICATIONS:   atorvastatin  40 mg Oral Daily    buPROPion  150 mg Oral BID    chlorhexidine  15 mL Mouth/Throat BID    mupirocin   Nasal BID     CURRENT INFUSIONS:   sodium chloride 0.9%       CURRENT PRN MEDICATIONS:  acetaminophen, magnesium oxide, magnesium oxide, ondansetron, potassium bicarbonate, potassium bicarbonate, potassium bicarbonate, potassium, sodium phosphates, potassium, sodium phosphates, potassium, sodium phosphates, sodium chloride 0.9%, sodium chloride 0.9%    REVIEW OF SYSTEMS:  Please refer to the HPI for all pertinent positive and negative findings. A comprehensive review of all other systems was negative.       PHYSICAL EXAM:  Patient Vitals for the past 24 hrs:   BP Temp Temp src Pulse Resp SpO2 Height Weight   04/19/22 0901 128/66 -- -- 73 14 -- -- --   04/19/22 0801 125/75 -- -- 81 18 -- -- --   04/19/22 0701 127/77 98.5 °F (36.9 °C) -- 76 (!) 30 (!) 94 % -- --   04/19/22 0600 118/73 -- -- 69 13 (!) 94 % -- --   04/19/22 0500 118/79 -- -- 78 17 95 % -- --   04/19/22 0400 117/78 -- -- 77 15 (!) 92 % -- --   04/19/22 0301 118/73 98.4 °F (36.9 °C) -- 69 14 97 % -- --   04/19/22 0300 128/81 98.2 °F (36.8 °C) Oral 69 20 95 % -- --   04/19/22 0200 119/86 -- -- 76 16 (!) 93 % -- --   04/19/22 0100 114/82 -- -- 81 (!) 22 (!) 92 % -- --   04/19/22 0000 121/85 98.4 °F (36.9 °C) Oral 80 18 (!) 94 % -- --   04/18/22 2300 -- -- -- 80 13 (!) 94 % -- --   04/18/22 2200 -- -- -- 100 (!) 43 (!) 94 % -- --   04/18/22 2117 139/85  "98.4 °F (36.9 °C) -- 80 14 99 % 5' 8" (1.727 m) 102.2 kg (225 lb 5 oz)   04/18/22 2100 -- -- -- 104 (!) 33 97 % -- --   04/18/22 2000 -- -- -- 87 (!) 34 (!) 89 % -- --   04/18/22 1900 (!) 152/73 -- -- 85 (!) 22 (!) 94 % -- --   04/18/22 1844 139/85 -- -- 80 14 95 % -- --   04/18/22 1643 (!) 142/78 -- -- 92 (!) 29 97 % -- --   04/18/22 1633 (!) 189/111 -- -- 91 (!) 26 97 % -- --   04/18/22 1630 -- -- -- 90 (!) 38 97 % -- --   04/18/22 1603 (!) 147/105 -- -- 96 (!) 43 98 % -- --   04/18/22 1600 -- -- -- 90 (!) 28 95 % -- --   04/18/22 1530 -- -- -- 99 19 (!) 91 % -- --   04/18/22 1503 (!) 150/98 -- -- 98 (!) 35 (!) 94 % -- --   04/18/22 1453 (!) 151/99 -- -- 83 15 95 % -- --   04/18/22 1435 -- -- -- -- -- -- -- 97.5 kg (215 lb)   04/18/22 1433 (!) 144/88 98.1 °F (36.7 °C) Oral 92 18 99 % -- --       GENERAL APPEARANCE: Alert, well-developed, well-nourished male in no acute distress.  HEENT: Normocephalic and atraumatic. PERRL. Oropharynx unremarkable.  PULM: Normal respiratory effort. No accessory muscle use.  CV: RRR.  ABDOMEN: Soft, nontender.  EXTREMITIES: No obvious signs of vascular compromise. Pulses present. No cyanosis, clubbing or edema.  SKIN: Clear; no rashes, lesions or skin breaks in exposed areas.    NEURO:  MENTAL STATUS: Patient awake and oriented to time, place, and person, recent/remote memory normal, attention span/concentration normal, speech fluent without paraphasic errors, good comprehension with appropriate thought content and fund of knowledge appropriate for patient's level of education.  Affect euthymic.    CRANIAL NERVES:  CN I: Not tested.  CN II: Fundoscopic exam deferred.  CN III, IV, VI: Pupils equal, round and reactive to light.  Extraocular movements full and intact.  CN V: Facial sensation normal.  CN VII: Facial asymmetry absent.  CN VIII: Hearing grossly normal and equal bilaterally.  No skew deviation or pathologic nystagmus.  CN IX, X: Palate elevates symmetrically. " Speech/articulation is clear without dysarthria.  CN XI: Shoulder shrug and chin rotation equal with good strength.  CN XII: Tongue protrusion midline.    MOTOR:  Bulk normal. Tone normal and symmetric throughout.  Abnormal movements absent.  Tremor: none present.  Strength 5/5 throughout except/unless specified below:.    REFLEXES:  DTRs 2+ throughout.  Plantar response downgoing bilaterally.  SENSATION: grossly intact throughout.  COORDINATION: normal finger-to-nose.  STATION: not tested.  GAIT: not tested.      NIHSS:  1a      Level of Consciousness (alert, drowsy, etc.):   0=alert; keenly responsive  1b.     Level of Consciousness Questions (month, age): 0= able to answer both questions  1c.     Level of Consciousness Commands (open, close eyes, make fist, let go):  0=Answers both tasks correctly  2.      Best Gaze (eyes open - patient follows examiner's finger or face):      0=normal  3.      Visual (introduce visual stimulus/threat to patient's visual field quadrants):  0=No visual loss  4.      Facial Palsy (show teeth, raise eyebrows and squeeze eyes shut):        0=Normal symmetric movement  5a.     Motor Arm - Left (elevate extremity 90 degrees and score drift/movement):       0=No drift, limb holds 90 (or 45) degrees for full 10 seconds  5b.     Motor Arm - Right (elevate extremity 90 degrees and score drift/movement):      0=No drift, limb holds 90 (or 45) degrees for full 10 seconds  6a.     Motor Leg - Left (elevate extremity 30 degrees and score drift/movement):       0=No drift, limb holds 90 (or 45) degrees for full 10 seconds  6b      Motor Leg - Right (elevate extremity 30 degrees and score drift/movement):      0=No drift, limb holds 90 (or 45) degrees for full 10 seconds  7.      Limb Ataxia (finger-nose, heel down shin):      0=Absent  8.      Sensory (pin prick to face, arm, trunk, and leg - compare side to side):        0=Normal; no sensory loss  9.      Best Language (name items, describe a  picture and read sentences):      0=No aphasia, normal  10.     Dysarthria (evaluate speech clarity by patient repeating listed words): 0=Normal  11.     Extinction and Inattention (use prior testing to identify neglect or double simultaneous stimuli testing):      0=No abnormality          NIH Stroke Scale Total:         0    Labs:  Recent Labs   Lab 04/18/22  1445 04/19/22  0604    134*   K 3.9 3.9    101   CO2 22* 24   BUN 15 13   CREATININE 1.3 1.1    103   CALCIUM 9.6 8.3*   PHOS  --  3.7   MG 2.3 2.3     Recent Labs   Lab 04/18/22  1445 04/19/22  0604   WBC 5.63 5.47   HGB 16.2 14.9   HCT 46.0 42.1    308     Recent Labs   Lab 04/18/22  1445 04/19/22  0604   ALBUMIN 4.5 3.9   PROT 8.2 7.2   BILITOT 0.4 0.6   ALKPHOS 182* 147*   ALT 57* 46*   AST 27 24     Lab Results   Component Value Date    INR 1.1 04/19/2022     Lab Results   Component Value Date    TRIG 212 (H) 04/19/2022    HDL 26 (L) 04/19/2022    CHOLHDL 14.4 (L) 04/19/2022     Lab Results   Component Value Date    HGBA1C 5.1 11/14/2017     No results found for: PROTEINCSF, GLUCCSF, WBCCSF    Imaging:  I have reviewed and interpreted the pertinent imaging and lab results.      Echo Saline Bubble? Yes  · The left ventricle is normal in size with concentric remodeling and   normal systolic function.  · The estimated ejection fraction is 65%.  · Normal left ventricular diastolic function.  · There is no evidence of intracardiac shunting. Bubble study negative for   PFO  · Atrial fibrillation not observed.  · Normal right ventricular size with normal right ventricular systolic   function.  · Normal central venous pressure (3 mmHg).  · The estimated PA systolic pressure is 11 mmHg.            ASSESSMENT & PLAN:    Right-sided numbness      Workup  · CTH: No acute intracranial abnormality   · CTA head and neck:  No large vessel occlusion/high-grade stenosis or aneurysm  · MRI brain:  Negative for acute abnormality including negative  for acute intracranial hemorrhage  · ECHO:  EF 65%, no PFO, AFib not observed,  · LDL: 111.6  · HbA1c: pending       Plan  · Admitted for further stroke workup  · Etiology of right-sided numbness could be TIA.  MRI brain was negative for acute stroke or hemorrhage.  · Start with Aspirin 81 mg and Lipitor 80mg  · Permissive BP to 220 systolic for 24hrs from symptom onset and after that normalize BP  · PT OT  · Extensively discussed lifestyle modifications as prophylactic measures for stroke prevention including, adequate blood pressure management, healthy diet and regular exercise.  · Patient is stable to be discharged from Neurology standpoint.  He had follow-up with neurology clinic in about 2 weeks.          Thank you kindly for including us in the care of this patient. Please do not hesitate to contact us with any questions.      Critical Care:  45 minutes of critical care time has been spent evaluating with the patient. Time includes chart review not limited to diagnostic imaging, labs, and vitals, patient assessment, discussion with family and nursing, current order evaluations, and new order entries.       Koko Stephenson MD  Neurology/vascular Neurology  Date of Service: 04/19/2022  9:47 AM    --------------------------------------------------------------------------------------------------------------------------------------------------------------------------------------------------------------------------------------------------------------  Please note: This note was transcribed using voice recognition software. Because of this technology there are often uinintended grammatical, spelling, and other transcription errors. Please disregard these errors.

## 2022-04-19 NOTE — CARE UPDATE
04/19/22 1104   PRE-TX-O2   O2 Device (Oxygen Therapy) room air   Pulse Oximetry Type Continuous   $ Pulse Oximetry - Multiple Charge Pulse Oximetry - Multiple   Respiratory Evaluation   $ Care Plan Tech Time 15 min

## 2022-04-19 NOTE — PT/OT/SLP PROGRESS
Occupational Therapy      Patient Name:  Iván Amaral   MRN:  [id number]    Patient not seen today secondary to Other (Comment) (RN discontinued orders).    4/19/2022

## 2022-04-19 NOTE — DISCHARGE SUMMARY
[facility]  Discharge Summary      Patient Name: Iván Amaral  MRN: [id number]  Patient Class: IP- Inpatient  Admission Date: 4/18/2022  Hospital Length of Stay: 1 days  Discharge Date and Time:  04/19/2022 2:01 PM  Attending Physician: Chandana Beard MD   Discharging Provider: Chandana Beard MD  Primary Care Provider: Hugo Moran MD      HPI:   37 year old  male presents to the emergency room with complaints right-sided weakness     The patient states that around 2 p.m. today while he was sitting he had onset of numbness and tingling to his right hand and was some associated right hand clumsiness while he was typing.  The patient states he got up and walked around and he noted right leg weakness and right-sided facial weakness.  He denied any headache no blurry vision no slurred speech no falling he denied any recent head trauma.  There was no nausea vomiting or diarrhea.       He describes his symptoms as moderate to severe severity with no alleviating or exacerbating factors     Past medical history significant hyperlipidemia, gout and stomach ulcers     In the emergency room a CT of the head was performed revealing a possible small subarachnoid hemorrhage     Neurosurgery and Neurology was consulted.     The patient subsequently had MRI of his brain suggesting no subarachnoid hemorrhage hemorrhage     On my exam the patient's NIH scale was within normal limits he had no neural vascular deficits his speech was clear and coherent and he denied any pain     I discussed this case with my attending Dr. Travis Chow and the patient will be admitted to the ICU for further management.  Neurology and neurosurgery is aware of the patient and agreed to see him in consult      * No surgery found *      Hospital Course:   The patient was admitted with symptoms of right-sided numbness and tingling spreading from his arm across the right side of his body.  See his  symptoms started while at work yesterday and progressively became worse which prompted him to come to the ED. Shortly after arrival in the ER his symptoms began to subside and soon thereafter completely resolved.  He has not had any recurrence of his symptoms since that time.  He has never had any symptoms like this before.  He did not have any other vision changes or any other neurological symptoms.  His CT in the ED was obtained which showed concern for small subarachnoid hemorrhage however on subsequent imaging with MRI this was ruled out.  The neurosurgery consult today shin was canceled.  Dr. Stephenson did see him and felt we should treat as TIA and have him follow-up as an outpatient.  He says if symptoms return then we would perform EEG at that time as well.  Given that his symptoms have totally resolved and MRI ruled out any signs of subarachnoid hemorrhage, will discharge home with plan for close follow-up as an outpatient with primary care physician Dr. Stephenson.        Goals of Care Treatment Preferences:  Code Status: Full Code      Consults:   Consults (From admission, onward)        Status Ordering Provider     Inpatient consult to Vascular (Stroke) Neurology  Once        Provider:  Jonel Rizzo DO    Acknowledged GABY GARCIA     Inpatient consult to Registered Dietitian/Nutritionist  Once        Provider:  (Not yet assigned)    Acknowledged GABY GARCIA     IP consult to case management/social work  Once        Provider:  (Not yet assigned)    Completed GABY GARCIA     Inpatient consult to Internal Medicine  Once        Provider:  Gaby Garcia NP    Acknowledged JENNY MELO     Inpatient consult to Neurology  Once        Provider:  Koko Stephenson MD    Completed JENNY MELO          * TIA (transient ischemic attack)  - the patient presented with symptoms as noted in hospital course.  CVA was ruled out.  There was initial concern for possible subarachnoid hemorrhage however this was ruled  out with subsequent MRI.  No other imaging abnormalities were noted.  The patient's symptoms resolved shortly after arrival in the ER and had not returned since that time.  He will be started on aspirin and Lipitor and will follow-up his primary care physician and neurologist as an outpatient.      Final Active Diagnoses:    Diagnosis Date Noted POA    PRINCIPAL PROBLEM:  TIA (transient ischemic attack) [G45.9] 04/19/2022 Yes    Subarachnoid bleed [I60.9] 04/18/2022 Yes      Problems Resolved During this Admission:       Discharged Condition: good    Disposition: Home or Self Care    Follow Up:   Follow-up Information     Hugo Moran MD. Schedule an appointment as soon as possible for a visit in 1 week(s).    Specialty: Family Medicine  Contact information:  [address]  [address]  [facility] [address]  [phone]             Koko Stephenson MD. Schedule an appointment as soon as possible for a visit in 2 week(s).    Specialty: Neurology  Contact information:  [address]  [facility] [address]  [phone]                       Patient Instructions:      Diet Adult Regular     No dressing needed     Activity as tolerated       Significant Diagnostic Studies: Labs:   BMP:   Recent Labs   Lab 04/18/22  1445 04/19/22  0604    103    134*   K 3.9 3.9    101   CO2 22* 24   BUN 15 13   CREATININE 1.3 1.1   CALCIUM 9.6 8.3*   MG 2.3 2.3    and CBC   Recent Labs   Lab 04/18/22  1445 04/19/22  0604   WBC 5.63 5.47   HGB 16.2 14.9   HCT 46.0 42.1    308       Pending Diagnostic Studies:     None         Medications:  Reconciled Home Medications:      Medication List      START taking these medications    aspirin 81 MG EC tablet  Commonly known as: ECOTRIN  Take 1 tablet (81 mg total) by mouth once daily.     atorvastatin 40 MG tablet  Commonly known as: LIPITOR  Take 1 tablet (40 mg total) by mouth once daily.  Start taking on: April 20, 2022        CONTINUE taking these medications     allopurinoL 300 MG tablet  Commonly known as: ZYLOPRIM  Take 1 tablet (300 mg total) by mouth once daily.     benzonatate 100 MG capsule  Commonly known as: TESSALON  Take 100 mg by mouth 3 (three) times daily as needed for Cough.     buPROPion 150 MG TBSR 12 hr tablet  Commonly known as: WELLBUTRIN SR  Take 1 tablet (150 mg total) by mouth 2 (two) times daily.     fluticasone propionate 50 mcg/actuation nasal spray  Commonly known as: FLONASE  1 spray (50 mcg total) by Each Nostril route once daily.     multivitamin per tablet  Commonly known as: THERAGRAN  Take 1 tablet by mouth once daily.        STOP taking these medications    methylPREDNISolone 4 mg tablet  Commonly known as: MEDROL DOSEPACK     pravastatin 10 MG tablet  Commonly known as: PRAVACHOL     pravastatin 20 MG tablet  Commonly known as: PRAVACHOL            Indwelling Lines/Drains at time of discharge:   Lines/Drains/Airways     None                 Time spent on the discharge of patient: 45 minutes         Chandana Beard MD  Department of Hospital Medicine  [facility]

## 2022-04-19 NOTE — PT/OT/SLP PROGRESS
Physical Therapy      Patient Name:  Iván Amaral   MRN:  [id number]    Patient not seen today secondary to Other (Comment) (RN discontinued order). Will follow-up n/a.

## 2022-04-19 NOTE — CONSULTS
"[facility]  Adult Nutrition   Consult Note (Initial Assessment)     SUMMARY     Recommendations  Recommendation/Intervention: 1. Recommend change diet to Cardiac. 2.  to obtain daily meal choices.  Goals: 1. Patient intake will be at least 75% of estimated energy needs. 2. Labs will trend to target range.  Nutrition Goal Status: new  Communication of RD Recs: reviewed with RN    Dietitian Rounds Brief  Consult for stroke evaluation. Patient with good po intake per nursing. Pending vascular and neuro consults. History of HLD and Gout. Diet per MD.  Patient to discharge home per RN.    Diet order:   Current Diet Order: Regular diet      Evaluation of Received Nutrient/Fluid Intake    % Intake of Estimated Energy Needs: 75 - 100 %  % Meal Intake: 75 - 100 %    Energy Calories Required: meeting needs  Protein Required: meeting needs  Fluid Required: meeting needs  Tolerance: tolerating    Anthropometrics  Temp: 98.5 °F (36.9 °C)  Height: 5' 8" (172.7 cm)  Height (inches): 68 in  Weight Method: Bed Scale  Weight: 102.2 kg (225 lb 5 oz)  Weight (lb): 225.31 lb  Ideal Body Weight (IBW), Male: 154 lb  % Ideal Body Weight, Male (lb): 146.31 %  BMI (Calculated): 34.3  BMI Grade: 30 - 34.9- obesity - grade I       Estimated/Assessed Needs  Weight Used For Calorie Calculations: 102.1 kg (225 lb 1.4 oz)  Energy Calorie Requirements (kcal): 7138-4964 kcal/day(18-20 kcal/kg     Protein Requirements: 105-140gm/day (1.5-2.0 gm/kg)  Weight Used For Protein Calculations: 70 kg (154 lb 5.2 oz) (IBW)  Fluid Requirements (mL): 1ml/kcal or per MD     RDA Method (mL): 1837       Reason for Assessment  Reason For Assessment: consult (Assess dietary needs -- Stroke trigger)  Diagnosis: stroke/CVA  Relevant Medical History: Gout, HLD, stomach ulcer    Nutrition/Diet History  Food Allergies: NKFA  Factors Affecting Nutritional Intake: None identified at this time    Nutrition Risk Screen  Nutrition Risk Screen: no " indicators present     MST Score: 0  Have you recently lost weight without trying?: No  Weight loss score: 0  Have you been eating poorly because of a decreased appetite?: No  Appetite score: 0       Weight History:  Wt Readings from Last 5 Encounters:   04/18/22 102.2 kg (225 lb 5 oz)   04/19/22 102.1 kg (225 lb)   03/22/22 106.2 kg (234 lb 1.6 oz)   01/14/22 106.1 kg (233 lb 12.8 oz)   06/11/21 99 kg (218 lb 4.8 oz)        Lab/Procedures/Meds: Pertinent Labs/Meds Reviewed    Medications:Pertinent Medications Reviewed  Scheduled Meds:   atorvastatin  40 mg Oral Daily    buPROPion  150 mg Oral BID    chlorhexidine  15 mL Mouth/Throat BID    mupirocin   Nasal BID     Continuous Infusions:   sodium chloride 0.9%       PRN Meds:.acetaminophen, magnesium oxide, magnesium oxide, ondansetron, potassium bicarbonate, potassium bicarbonate, potassium bicarbonate, potassium, sodium phosphates, potassium, sodium phosphates, potassium, sodium phosphates, sodium chloride 0.9%, sodium chloride 0.9%    Labs: Pertinent Labs Reviewed  Clinical Chemistry:  Recent Labs   Lab 04/19/22  0604   *   K 3.9      CO2 24      BUN 13   CREATININE 1.1   CALCIUM 8.3*   PROT 7.2   ALBUMIN 3.9   BILITOT 0.6   ALKPHOS 147*   AST 24   ALT 46*   ANIONGAP 9   ESTGFRAFRICA >60.0   EGFRNONAA >60.0   MG 2.3   PHOS 3.7     CBC:   Recent Labs   Lab 04/19/22  0604   WBC 5.47   RBC 5.05   HGB 14.9   HCT 42.1      MCV 83   MCH 29.5   MCHC 35.4     Lipid Panel:  Recent Labs   Lab 04/19/22  0431   CHOL 180   HDL 26*   LDLCALC 111.6   TRIG 212*   CHOLHDL 14.4*     Cardiac Profile:  Recent Labs   Lab 04/18/22  1608 04/19/22  0604   BNP 6  --    TROPONINI <0.030 <0.030     Inflammatory Labs:  No results for input(s): CRP in the last 168 hours.  Diabetes:  No results for input(s): HGBA1C, POCTGLUCOSE in the last 168 hours.  Thyroid & Parathyroid:  Recent Labs   Lab 04/19/22  0431   TSH 3.270       Monitor and Evaluation  Food and  Nutrient Intake: energy intake, food and beverage intake  Food and Nutrient Adminstration: diet order  Knowledge/Beliefs/Attitudes: food and nutrition knowledge/skill  Physical Activity and Function: nutrition-related ADLs and IADLs  Anthropometric Measurements: weight, weight change, body mass index  Biochemical Data, Medical Tests and Procedures: electrolyte and renal panel, lipid profile, gastrointestinal profile, glucose/endocrine profile, inflammatory profile  Nutrition-Focused Physical Findings: overall appearance     Nutrition Risk  Level of Risk/Frequency of Follow-up: moderate - high     Nutrition Follow-Up  RD Follow-up?: Yes      Ruby Jasmine RD, LDN 04/19/2022 9:37 AM

## 2022-04-19 NOTE — PROGRESS NOTES
Pt discharge instructions given and pt verbalized understanding.  Pt discharged to home in stable condition.

## 2022-04-19 NOTE — HPI
37 year old  male presents to the emergency room with complaints right-sided weakness     The patient states that around 2 p.m. today while he was sitting he had onset of numbness and tingling to his right hand and was some associated right hand clumsiness while he was typing.  The patient states he got up and walked around and he noted right leg weakness and right-sided facial weakness.  He denied any headache no blurry vision no slurred speech no falling he denied any recent head trauma.  There was no nausea vomiting or diarrhea.       He describes his symptoms as moderate to severe severity with no alleviating or exacerbating factors     Past medical history significant hyperlipidemia, gout and stomach ulcers     In the emergency room a CT of the head was performed revealing a possible small subarachnoid hemorrhage     Neurosurgery and Neurology was consulted.     The patient subsequently had MRI of his brain suggesting no subarachnoid hemorrhage hemorrhage     On my exam the patient's NIH scale was within normal limits he had no neural vascular deficits his speech was clear and coherent and he denied any pain     I discussed this case with my attending Dr. Travis Chow and the patient will be admitted to the ICU for further management.  Neurology and neurosurgery is aware of the patient and agreed to see him in consult

## 2022-04-20 ENCOUNTER — PATIENT MESSAGE (OUTPATIENT)
Dept: FAMILY MEDICINE | Facility: CLINIC | Age: 37
End: 2022-04-20

## 2022-04-21 ENCOUNTER — PATIENT MESSAGE (OUTPATIENT)
Dept: FAMILY MEDICINE | Facility: CLINIC | Age: 37
End: 2022-04-21

## 2022-04-21 NOTE — TELEPHONE ENCOUNTER
Patient is concerned with occasional s/s of stroke.  Went to ER on 4/18/22 and doctors could not find any evidence of this. He is encouraged to make an appointment with PCP at his earliest convenience.

## 2022-04-21 NOTE — TELEPHONE ENCOUNTER
Per Dr. Moran  take ASA 325mg/day until RTC.   Patient portal message sent to patient   Advising if symptoms worsen return the ED

## 2022-04-24 ENCOUNTER — HOSPITAL ENCOUNTER (INPATIENT)
Facility: HOSPITAL | Age: 37
LOS: 5 days | Discharge: HOME | DRG: 097 | End: 2022-04-29
Attending: EMERGENCY MEDICINE | Admitting: FAMILY MEDICINE
Payer: COMMERCIAL

## 2022-04-24 DIAGNOSIS — G04.90 MENINGOENCEPHALITIS: Primary | ICD-10-CM

## 2022-04-24 DIAGNOSIS — G25.79 SEROTONIN SYNDROME: ICD-10-CM

## 2022-04-24 DIAGNOSIS — R41.82 ALTERED MENTAL STATUS, UNSPECIFIED ALTERED MENTAL STATUS TYPE: ICD-10-CM

## 2022-04-24 DIAGNOSIS — G03.9 MENINGITIS: ICD-10-CM

## 2022-04-24 DIAGNOSIS — G04.90 ENCEPHALITIS: ICD-10-CM

## 2022-04-24 DIAGNOSIS — R07.9 CHEST PAIN: ICD-10-CM

## 2022-04-24 DIAGNOSIS — R41.0 DELIRIUM, ACUTE: ICD-10-CM

## 2022-04-24 PROBLEM — G90.81 SEROTONIN SYNDROME: Status: ACTIVE | Noted: 2022-04-24

## 2022-04-24 PROBLEM — I60.9 SUBARACHNOID BLEED: Status: RESOLVED | Noted: 2022-04-18 | Resolved: 2022-04-24

## 2022-04-24 PROBLEM — G45.9 TIA (TRANSIENT ISCHEMIC ATTACK): Status: RESOLVED | Noted: 2022-04-19 | Resolved: 2022-04-24

## 2022-04-24 LAB
ALBUMIN SERPL BCP-MCNC: 5 G/DL (ref 3.5–5.2)
ALLENS TEST: ABNORMAL
ALP SERPL-CCNC: 158 U/L (ref 55–135)
ALT SERPL W/O P-5'-P-CCNC: 27 U/L (ref 10–44)
AMPHET+METHAMPHET UR QL: NEGATIVE
ANION GAP SERPL CALC-SCNC: 20 MMOL/L (ref 8–16)
AST SERPL-CCNC: 29 U/L (ref 10–40)
BARBITURATES UR QL SCN>200 NG/ML: NEGATIVE
BASOPHILS # BLD AUTO: 0.09 K/UL (ref 0–0.2)
BASOPHILS NFR BLD: 0.5 % (ref 0–1.9)
BENZODIAZ UR QL SCN>200 NG/ML: NEGATIVE
BILIRUB SERPL-MCNC: 0.6 MG/DL (ref 0.1–1)
BNP SERPL-MCNC: 76 PG/ML (ref 0–99)
BUN SERPL-MCNC: 13 MG/DL (ref 6–20)
BZE UR QL SCN: NEGATIVE
CALCIUM SERPL-MCNC: 10 MG/DL (ref 8.7–10.5)
CANNABINOIDS UR QL SCN: NEGATIVE
CHLORIDE SERPL-SCNC: 106 MMOL/L (ref 95–110)
CK SERPL-CCNC: 592 U/L (ref 20–200)
CK SERPL-CCNC: 592 U/L (ref 20–200)
CLARITY CSF: ABNORMAL
CO2 SERPL-SCNC: 21 MMOL/L (ref 23–29)
COLOR CSF: ABNORMAL
CREAT SERPL-MCNC: 1.5 MG/DL (ref 0.5–1.4)
CREAT UR-MCNC: 532 MG/DL (ref 23–375)
DELSYS: ABNORMAL
DIFFERENTIAL METHOD: ABNORMAL
EOSINOPHIL # BLD AUTO: 0.2 K/UL (ref 0–0.5)
EOSINOPHIL NFR BLD: 1.2 % (ref 0–8)
ERYTHROCYTE [DISTWIDTH] IN BLOOD BY AUTOMATED COUNT: 12.6 % (ref 11.5–14.5)
ERYTHROCYTE [SEDIMENTATION RATE] IN BLOOD BY WESTERGREN METHOD: 22 MM/H
EST. GFR  (AFRICAN AMERICAN): >60 ML/MIN/1.73 M^2
EST. GFR  (NON AFRICAN AMERICAN): 58.2 ML/MIN/1.73 M^2
FIO2: 80
GLUCOSE CSF-MCNC: 80 MG/DL (ref 40–70)
GLUCOSE SERPL-MCNC: 213 MG/DL (ref 70–110)
GLUCOSE SERPL-MCNC: 235 MG/DL (ref 70–110)
HCO3 UR-SCNC: 20.8 MMOL/L (ref 24–28)
HCT VFR BLD AUTO: 48.5 % (ref 40–54)
HCT VFR BLD CALC: 46 %PCV (ref 36–54)
HGB BLD-MCNC: 16.7 G/DL (ref 14–18)
IMM GRANULOCYTES # BLD AUTO: 0.12 K/UL (ref 0–0.04)
IMM GRANULOCYTES NFR BLD AUTO: 0.7 % (ref 0–0.5)
LACTATE SERPL-SCNC: 14.2 MMOL/L (ref 0.5–1.9)
LYMPHOCYTES # BLD AUTO: 5.1 K/UL (ref 1–4.8)
LYMPHOCYTES NFR BLD: 29.5 % (ref 18–48)
LYMPHOCYTES NFR CSF MANUAL: 88 % (ref 40–80)
MCH RBC QN AUTO: 29.3 PG (ref 27–31)
MCHC RBC AUTO-ENTMCNC: 34.4 G/DL (ref 32–36)
MCV RBC AUTO: 85 FL (ref 82–98)
MIN VOL: 11.4
MODE: ABNORMAL
MONOCYTES # BLD AUTO: 1.1 K/UL (ref 0.3–1)
MONOCYTES NFR BLD: 6.1 % (ref 4–15)
MONOS+MACROS NFR CSF MANUAL: 10 % (ref 15–45)
NEUTROPHILS # BLD AUTO: 10.7 K/UL (ref 1.8–7.7)
NEUTROPHILS NFR BLD: 62 % (ref 38–73)
NEUTROPHILS NFR CSF MANUAL: 2 % (ref 0–6)
NRBC BLD-RTO: 0 /100 WBC
OPIATES UR QL SCN: NEGATIVE
PCO2 BLDA: 41.7 MMHG (ref 35–45)
PCP UR QL SCN>25 NG/ML: NEGATIVE
PEEP: 5
PH SMN: 7.3 [PH] (ref 7.35–7.45)
PIP: 23
PLATELET # BLD AUTO: 622 K/UL (ref 150–450)
PMV BLD AUTO: 9.8 FL (ref 9.2–12.9)
PO2 BLDA: 113 MMHG (ref 80–100)
POC BE: -6 MMOL/L
POC IONIZED CALCIUM: 1.24 MMOL/L (ref 1.06–1.42)
POC SATURATED O2: 98 % (ref 95–100)
POC TCO2: 22 MMOL/L (ref 23–27)
POTASSIUM BLD-SCNC: 3.9 MMOL/L (ref 3.5–5.1)
POTASSIUM SERPL-SCNC: 4.6 MMOL/L (ref 3.5–5.1)
PROT CSF-MCNC: 186 MG/DL (ref 15–40)
PROT SERPL-MCNC: 8.9 G/DL (ref 6–8.4)
RBC # BLD AUTO: 5.7 M/UL (ref 4.6–6.2)
RBC # CSF: 2965 /CU MM
SALICYLATES SERPL-MCNC: <4 MG/DL (ref 15–30)
SAMPLE: ABNORMAL
SARS-COV-2 RDRP RESP QL NAA+PROBE: NEGATIVE
SITE: ABNORMAL
SODIUM BLD-SCNC: 138 MMOL/L (ref 136–145)
SODIUM SERPL-SCNC: 147 MMOL/L (ref 136–145)
SP02: 100
SPECIMEN VOL CSF: 3 ML
TOXICOLOGY INFORMATION: ABNORMAL
TROPONIN I SERPL DL<=0.01 NG/ML-MCNC: <0.03 NG/ML
TROPONIN I SERPL DL<=0.01 NG/ML-MCNC: <0.03 NG/ML
VT: 500
WBC # BLD AUTO: 17.27 K/UL (ref 3.9–12.7)
WBC # CSF: 225 /CU MM (ref 0–5)

## 2022-04-24 PROCEDURE — 87205 SMEAR GRAM STAIN: CPT | Performed by: EMERGENCY MEDICINE

## 2022-04-24 PROCEDURE — 99900031 HC PATIENT EDUCATION (STAT)

## 2022-04-24 PROCEDURE — 93010 ELECTROCARDIOGRAM REPORT: CPT | Mod: ,,, | Performed by: GENERAL PRACTICE

## 2022-04-24 PROCEDURE — 84132 ASSAY OF SERUM POTASSIUM: CPT

## 2022-04-24 PROCEDURE — 96375 TX/PRO/DX INJ NEW DRUG ADDON: CPT

## 2022-04-24 PROCEDURE — 87483 CNS DNA AMP PROBE TYPE 12-25: CPT | Performed by: FAMILY MEDICINE

## 2022-04-24 PROCEDURE — 84157 ASSAY OF PROTEIN OTHER: CPT | Performed by: EMERGENCY MEDICINE

## 2022-04-24 PROCEDURE — 87070 CULTURE OTHR SPECIMN AEROBIC: CPT | Performed by: EMERGENCY MEDICINE

## 2022-04-24 PROCEDURE — 31500 INSERT EMERGENCY AIRWAY: CPT

## 2022-04-24 PROCEDURE — 27100171 HC OXYGEN HIGH FLOW UP TO 24 HOURS

## 2022-04-24 PROCEDURE — 99900035 HC TECH TIME PER 15 MIN (STAT)

## 2022-04-24 PROCEDURE — 36600 WITHDRAWAL OF ARTERIAL BLOOD: CPT

## 2022-04-24 PROCEDURE — 63600175 PHARM REV CODE 636 W HCPCS

## 2022-04-24 PROCEDURE — 94002 VENT MGMT INPAT INIT DAY: CPT

## 2022-04-24 PROCEDURE — 87899 AGENT NOS ASSAY W/OPTIC: CPT | Performed by: FAMILY MEDICINE

## 2022-04-24 PROCEDURE — 62272 THER SPI PNXR DRG CSF: CPT

## 2022-04-24 PROCEDURE — 89051 BODY FLUID CELL COUNT: CPT | Performed by: EMERGENCY MEDICINE

## 2022-04-24 PROCEDURE — 87102 FUNGUS ISOLATION CULTURE: CPT | Performed by: INTERNAL MEDICINE

## 2022-04-24 PROCEDURE — 86341 ISLET CELL ANTIBODY: CPT | Performed by: FAMILY MEDICINE

## 2022-04-24 PROCEDURE — 30000890 LABCORP MISCELLANEOUS TEST: Performed by: FAMILY MEDICINE

## 2022-04-24 PROCEDURE — U0002 COVID-19 LAB TEST NON-CDC: HCPCS | Performed by: EMERGENCY MEDICINE

## 2022-04-24 PROCEDURE — 25000003 PHARM REV CODE 250: Performed by: EMERGENCY MEDICINE

## 2022-04-24 PROCEDURE — 84295 ASSAY OF SERUM SODIUM: CPT

## 2022-04-24 PROCEDURE — 84145 PROCALCITONIN (PCT): CPT | Performed by: EMERGENCY MEDICINE

## 2022-04-24 PROCEDURE — 82330 ASSAY OF CALCIUM: CPT

## 2022-04-24 PROCEDURE — 85025 COMPLETE CBC W/AUTO DIFF WBC: CPT | Performed by: EMERGENCY MEDICINE

## 2022-04-24 PROCEDURE — 80053 COMPREHEN METABOLIC PANEL: CPT | Performed by: EMERGENCY MEDICINE

## 2022-04-24 PROCEDURE — 96368 THER/DIAG CONCURRENT INF: CPT

## 2022-04-24 PROCEDURE — 80307 DRUG TEST PRSMV CHEM ANLYZR: CPT | Performed by: EMERGENCY MEDICINE

## 2022-04-24 PROCEDURE — 87040 BLOOD CULTURE FOR BACTERIA: CPT | Mod: 59 | Performed by: EMERGENCY MEDICINE

## 2022-04-24 PROCEDURE — 63600175 PHARM REV CODE 636 W HCPCS: Performed by: EMERGENCY MEDICINE

## 2022-04-24 PROCEDURE — 20000000 HC ICU ROOM

## 2022-04-24 PROCEDURE — 82550 ASSAY OF CK (CPK): CPT | Performed by: EMERGENCY MEDICINE

## 2022-04-24 PROCEDURE — 30000890 MISCELLANEOUS SENDOUT TEST, NON-BLOOD: Performed by: FAMILY MEDICINE

## 2022-04-24 PROCEDURE — 82945 GLUCOSE OTHER FLUID: CPT | Performed by: EMERGENCY MEDICINE

## 2022-04-24 PROCEDURE — 86255 FLUORESCENT ANTIBODY SCREEN: CPT

## 2022-04-24 PROCEDURE — 87529 HSV DNA AMP PROBE: CPT | Performed by: EMERGENCY MEDICINE

## 2022-04-24 PROCEDURE — 30000890 LABCORP MISCELLANEOUS TEST: Mod: 91 | Performed by: EMERGENCY MEDICINE

## 2022-04-24 PROCEDURE — 84484 ASSAY OF TROPONIN QUANT: CPT | Mod: 91 | Performed by: EMERGENCY MEDICINE

## 2022-04-24 PROCEDURE — 87210 SMEAR WET MOUNT SALINE/INK: CPT | Performed by: FAMILY MEDICINE

## 2022-04-24 PROCEDURE — 99291 CRITICAL CARE FIRST HOUR: CPT

## 2022-04-24 PROCEDURE — 93010 EKG 12-LEAD: ICD-10-PCS | Mod: ,,, | Performed by: GENERAL PRACTICE

## 2022-04-24 PROCEDURE — 82803 BLOOD GASES ANY COMBINATION: CPT

## 2022-04-24 PROCEDURE — 93005 ELECTROCARDIOGRAM TRACING: CPT | Performed by: GENERAL PRACTICE

## 2022-04-24 PROCEDURE — 80179 DRUG ASSAY SALICYLATE: CPT | Performed by: EMERGENCY MEDICINE

## 2022-04-24 PROCEDURE — 63600175 PHARM REV CODE 636 W HCPCS: Performed by: FAMILY MEDICINE

## 2022-04-24 PROCEDURE — 83605 ASSAY OF LACTIC ACID: CPT | Performed by: EMERGENCY MEDICINE

## 2022-04-24 PROCEDURE — 94761 N-INVAS EAR/PLS OXIMETRY MLT: CPT

## 2022-04-24 PROCEDURE — 96365 THER/PROPH/DIAG IV INF INIT: CPT

## 2022-04-24 PROCEDURE — 83880 ASSAY OF NATRIURETIC PEPTIDE: CPT | Performed by: EMERGENCY MEDICINE

## 2022-04-24 PROCEDURE — 87529 HSV DNA AMP PROBE: CPT | Mod: 91 | Performed by: EMERGENCY MEDICINE

## 2022-04-24 PROCEDURE — 85014 HEMATOCRIT: CPT

## 2022-04-24 PROCEDURE — 30000890 HC MISC. SEND OUT TEST

## 2022-04-24 PROCEDURE — 25000003 PHARM REV CODE 250: Performed by: FAMILY MEDICINE

## 2022-04-24 RX ORDER — GLUCAGON 1 MG
1 KIT INJECTION
Status: DISCONTINUED | OUTPATIENT
Start: 2022-04-25 | End: 2022-04-29 | Stop reason: HOSPADM

## 2022-04-24 RX ORDER — POLYETHYLENE GLYCOL 3350 17 G/17G
17 POWDER, FOR SOLUTION ORAL 2 TIMES DAILY PRN
Status: DISCONTINUED | OUTPATIENT
Start: 2022-04-25 | End: 2022-04-24

## 2022-04-24 RX ORDER — SIMETHICONE 80 MG
1 TABLET,CHEWABLE ORAL 4 TIMES DAILY PRN
Status: DISCONTINUED | OUTPATIENT
Start: 2022-04-25 | End: 2022-04-24

## 2022-04-24 RX ORDER — IBUPROFEN 200 MG
24 TABLET ORAL
Status: DISCONTINUED | OUTPATIENT
Start: 2022-04-25 | End: 2022-04-29 | Stop reason: HOSPADM

## 2022-04-24 RX ORDER — TALC
6 POWDER (GRAM) TOPICAL NIGHTLY PRN
Status: DISCONTINUED | OUTPATIENT
Start: 2022-04-25 | End: 2022-04-24

## 2022-04-24 RX ORDER — MAGNESIUM SULFATE HEPTAHYDRATE 40 MG/ML
2 INJECTION, SOLUTION INTRAVENOUS ONCE
Status: COMPLETED | OUTPATIENT
Start: 2022-04-24 | End: 2022-04-25

## 2022-04-24 RX ORDER — VANCOMYCIN HCL IN 5 % DEXTROSE 1G/250ML
1000 PLASTIC BAG, INJECTION (ML) INTRAVENOUS ONCE
Status: DISCONTINUED | OUTPATIENT
Start: 2022-04-24 | End: 2022-04-25

## 2022-04-24 RX ORDER — IPRATROPIUM BROMIDE AND ALBUTEROL SULFATE 2.5; .5 MG/3ML; MG/3ML
3 SOLUTION RESPIRATORY (INHALATION) EVERY 4 HOURS PRN
Status: DISCONTINUED | OUTPATIENT
Start: 2022-04-25 | End: 2022-04-29 | Stop reason: HOSPADM

## 2022-04-24 RX ORDER — PROPOFOL 10 MG/ML
0-50 INJECTION, EMULSION INTRAVENOUS CONTINUOUS
Status: DISCONTINUED | OUTPATIENT
Start: 2022-04-24 | End: 2022-04-25

## 2022-04-24 RX ORDER — SODIUM CHLORIDE, SODIUM LACTATE, POTASSIUM CHLORIDE, CALCIUM CHLORIDE 600; 310; 30; 20 MG/100ML; MG/100ML; MG/100ML; MG/100ML
1000 INJECTION, SOLUTION INTRAVENOUS
Status: COMPLETED | OUTPATIENT
Start: 2022-04-24 | End: 2022-04-24

## 2022-04-24 RX ORDER — SODIUM CHLORIDE 9 MG/ML
INJECTION, SOLUTION INTRAVENOUS CONTINUOUS
Status: DISCONTINUED | OUTPATIENT
Start: 2022-04-24 | End: 2022-04-26

## 2022-04-24 RX ORDER — NALOXONE HCL 0.4 MG/ML
0.02 VIAL (ML) INJECTION
Status: DISCONTINUED | OUTPATIENT
Start: 2022-04-25 | End: 2022-04-29 | Stop reason: HOSPADM

## 2022-04-24 RX ORDER — ROCURONIUM BROMIDE 10 MG/ML
50 INJECTION, SOLUTION INTRAVENOUS ONCE
Status: COMPLETED | OUTPATIENT
Start: 2022-04-24 | End: 2022-04-24

## 2022-04-24 RX ORDER — LORAZEPAM 2 MG/ML
2 INJECTION INTRAMUSCULAR EVERY 6 HOURS
Status: DISCONTINUED | OUTPATIENT
Start: 2022-04-24 | End: 2022-04-25

## 2022-04-24 RX ORDER — LORAZEPAM 2 MG/ML
INJECTION INTRAMUSCULAR
Status: COMPLETED
Start: 2022-04-24 | End: 2022-04-24

## 2022-04-24 RX ORDER — LORAZEPAM 2 MG/ML
2 INJECTION INTRAMUSCULAR
Status: DISCONTINUED | OUTPATIENT
Start: 2022-04-24 | End: 2022-04-29 | Stop reason: HOSPADM

## 2022-04-24 RX ORDER — FENTANYL CITRATE 50 UG/ML
INJECTION, SOLUTION INTRAMUSCULAR; INTRAVENOUS
Status: DISPENSED
Start: 2022-04-24 | End: 2022-04-25

## 2022-04-24 RX ORDER — AMOXICILLIN 250 MG
1 CAPSULE ORAL 2 TIMES DAILY PRN
Status: DISCONTINUED | OUTPATIENT
Start: 2022-04-25 | End: 2022-04-24

## 2022-04-24 RX ORDER — HYDRALAZINE HYDROCHLORIDE 20 MG/ML
10 INJECTION INTRAMUSCULAR; INTRAVENOUS EVERY 4 HOURS PRN
Status: DISCONTINUED | OUTPATIENT
Start: 2022-04-24 | End: 2022-04-29 | Stop reason: HOSPADM

## 2022-04-24 RX ORDER — HYDROCODONE BITARTRATE AND ACETAMINOPHEN 5; 325 MG/1; MG/1
1 TABLET ORAL EVERY 4 HOURS PRN
Status: DISCONTINUED | OUTPATIENT
Start: 2022-04-25 | End: 2022-04-24

## 2022-04-24 RX ORDER — DIAZEPAM 10 MG/2ML
5 INJECTION INTRAMUSCULAR
Status: COMPLETED | OUTPATIENT
Start: 2022-04-24 | End: 2022-04-24

## 2022-04-24 RX ORDER — CYPROHEPTADINE HYDROCHLORIDE 4 MG/1
12 TABLET ORAL ONCE
Status: CANCELLED | OUTPATIENT
Start: 2022-04-24

## 2022-04-24 RX ORDER — MORPHINE SULFATE 4 MG/ML
4 INJECTION, SOLUTION INTRAMUSCULAR; INTRAVENOUS EVERY 4 HOURS PRN
Status: DISCONTINUED | OUTPATIENT
Start: 2022-04-25 | End: 2022-04-24

## 2022-04-24 RX ORDER — VANCOMYCIN HCL IN 5 % DEXTROSE 1G/250ML
1000 PLASTIC BAG, INJECTION (ML) INTRAVENOUS ONCE
Status: COMPLETED | OUTPATIENT
Start: 2022-04-24 | End: 2022-04-25

## 2022-04-24 RX ORDER — FENTANYL CITRATE 50 UG/ML
100 INJECTION, SOLUTION INTRAMUSCULAR; INTRAVENOUS
Status: COMPLETED | OUTPATIENT
Start: 2022-04-24 | End: 2022-04-24

## 2022-04-24 RX ORDER — LORAZEPAM 2 MG/ML
2 INJECTION INTRAMUSCULAR
Status: COMPLETED | OUTPATIENT
Start: 2022-04-24 | End: 2022-04-24

## 2022-04-24 RX ORDER — SODIUM CHLORIDE 0.9 % (FLUSH) 0.9 %
10 SYRINGE (ML) INJECTION
Status: DISCONTINUED | OUTPATIENT
Start: 2022-04-25 | End: 2022-04-29 | Stop reason: HOSPADM

## 2022-04-24 RX ORDER — DIAZEPAM 10 MG/2ML
INJECTION INTRAMUSCULAR
Status: COMPLETED
Start: 2022-04-24 | End: 2022-04-24

## 2022-04-24 RX ORDER — ONDANSETRON 2 MG/ML
4 INJECTION INTRAMUSCULAR; INTRAVENOUS EVERY 6 HOURS PRN
Status: DISCONTINUED | OUTPATIENT
Start: 2022-04-25 | End: 2022-04-24

## 2022-04-24 RX ORDER — HYDRALAZINE HYDROCHLORIDE 20 MG/ML
5 INJECTION INTRAMUSCULAR; INTRAVENOUS EVERY 4 HOURS PRN
Status: DISCONTINUED | OUTPATIENT
Start: 2022-04-24 | End: 2022-04-29 | Stop reason: HOSPADM

## 2022-04-24 RX ORDER — IBUPROFEN 200 MG
16 TABLET ORAL
Status: DISCONTINUED | OUTPATIENT
Start: 2022-04-25 | End: 2022-04-29 | Stop reason: HOSPADM

## 2022-04-24 RX ORDER — PROPOFOL 10 MG/ML
INJECTION, EMULSION INTRAVENOUS
Status: DISPENSED
Start: 2022-04-24 | End: 2022-04-25

## 2022-04-24 RX ADMIN — SODIUM CHLORIDE, POTASSIUM CHLORIDE, SODIUM LACTATE AND CALCIUM CHLORIDE 1000 ML: 600; 310; 30; 20 INJECTION, SOLUTION INTRAVENOUS at 08:04

## 2022-04-24 RX ADMIN — LORAZEPAM 2 MG: 2 INJECTION INTRAMUSCULAR; INTRAVENOUS at 06:04

## 2022-04-24 RX ADMIN — SODIUM CHLORIDE: 0.9 INJECTION, SOLUTION INTRAVENOUS at 11:04

## 2022-04-24 RX ADMIN — SODIUM CHLORIDE, POTASSIUM CHLORIDE, SODIUM LACTATE AND CALCIUM CHLORIDE 1000 ML: 600; 310; 30; 20 INJECTION, SOLUTION INTRAVENOUS at 10:04

## 2022-04-24 RX ADMIN — DIAZEPAM 5 MG: 5 INJECTION, SOLUTION INTRAMUSCULAR; INTRAVENOUS at 06:04

## 2022-04-24 RX ADMIN — CEFTRIAXONE 2 G: 2 INJECTION, SOLUTION INTRAVENOUS at 10:04

## 2022-04-24 RX ADMIN — LORAZEPAM 2 MG: 2 INJECTION INTRAMUSCULAR at 06:04

## 2022-04-24 RX ADMIN — DIAZEPAM 5 MG: 10 INJECTION INTRAMUSCULAR at 06:04

## 2022-04-24 RX ADMIN — FENTANYL CITRATE 100 MCG: 50 INJECTION, SOLUTION INTRAMUSCULAR; INTRAVENOUS at 10:04

## 2022-04-24 RX ADMIN — ROCURONIUM BROMIDE 50 MG: 10 INJECTION, SOLUTION INTRAVENOUS at 09:04

## 2022-04-24 RX ADMIN — MAGNESIUM SULFATE IN WATER 2 G: 40 INJECTION, SOLUTION INTRAVENOUS at 10:04

## 2022-04-24 RX ADMIN — PROPOFOL 40 MCG/KG/MIN: 10 INJECTION, EMULSION INTRAVENOUS at 11:04

## 2022-04-24 NOTE — ED TRIAGE NOTES
Admit per [facility], 38 yo male recently admitted to [facility] for r/o CVA. Today had and episode of not responding and staring off to the left. Lasted appx 45-60 sec. Afterwards he reported difficulty swallowing and had an expressive aphasia upon EMS arrival.

## 2022-04-25 PROBLEM — G03.9 MENINGITIS: Status: ACTIVE | Noted: 2022-04-25

## 2022-04-25 PROBLEM — M10.9 ACUTE GOUT OF LEFT FOOT: Status: RESOLVED | Noted: 2020-12-07 | Resolved: 2022-04-25

## 2022-04-25 LAB
ADENOVIRUS: NOT DETECTED
ALBUMIN SERPL BCP-MCNC: 4.4 G/DL (ref 3.5–5.2)
ALLENS TEST: ABNORMAL
ALP SERPL-CCNC: 132 U/L (ref 55–135)
ALT SERPL W/O P-5'-P-CCNC: 25 U/L (ref 10–44)
ANION GAP SERPL CALC-SCNC: 11 MMOL/L (ref 8–16)
ANION GAP SERPL CALC-SCNC: 14 MMOL/L (ref 8–16)
APTT PPP: 25.2 SEC (ref 23.3–35.1)
AST SERPL-CCNC: 31 U/L (ref 10–40)
B-OH-BUTYR BLD STRIP-SCNC: 0.2 MMOL/L (ref 0–0.5)
BASOPHILS # BLD AUTO: 0.06 K/UL (ref 0–0.2)
BASOPHILS NFR BLD: 0.4 % (ref 0–1.9)
BILIRUB SERPL-MCNC: 0.5 MG/DL (ref 0.1–1)
BORDETELLA PARAPERTUSSIS (IS1001): NOT DETECTED
BORDETELLA PERTUSSIS (PTXP): NOT DETECTED
BUN SERPL-MCNC: 13 MG/DL (ref 6–20)
BUN SERPL-MCNC: 15 MG/DL (ref 6–20)
CALCIUM SERPL-MCNC: 9.1 MG/DL (ref 8.7–10.5)
CALCIUM SERPL-MCNC: 9.3 MG/DL (ref 8.7–10.5)
CHLAMYDIA PNEUMONIAE: NOT DETECTED
CHLORIDE SERPL-SCNC: 102 MMOL/L (ref 95–110)
CHLORIDE SERPL-SCNC: 103 MMOL/L (ref 95–110)
CO2 SERPL-SCNC: 23 MMOL/L (ref 23–29)
CO2 SERPL-SCNC: 24 MMOL/L (ref 23–29)
CORONAVIRUS 229E, COMMON COLD VIRUS: NOT DETECTED
CORONAVIRUS HKU1, COMMON COLD VIRUS: NOT DETECTED
CORONAVIRUS NL63, COMMON COLD VIRUS: NOT DETECTED
CORONAVIRUS OC43, COMMON COLD VIRUS: NOT DETECTED
CREAT SERPL-MCNC: 1.3 MG/DL (ref 0.5–1.4)
CREAT SERPL-MCNC: 1.3 MG/DL (ref 0.5–1.4)
CRP SERPL-MCNC: 0.55 MG/DL
DELSYS: ABNORMAL
DIFFERENTIAL METHOD: ABNORMAL
EOSINOPHIL # BLD AUTO: 0.1 K/UL (ref 0–0.5)
EOSINOPHIL NFR BLD: 0.5 % (ref 0–8)
ERYTHROCYTE [DISTWIDTH] IN BLOOD BY AUTOMATED COUNT: 12.6 % (ref 11.5–14.5)
ERYTHROCYTE [SEDIMENTATION RATE] IN BLOOD BY WESTERGREN METHOD: 20 MM/H
EST. GFR  (AFRICAN AMERICAN): >60 ML/MIN/1.73 M^2
EST. GFR  (AFRICAN AMERICAN): >60 ML/MIN/1.73 M^2
EST. GFR  (NON AFRICAN AMERICAN): >60 ML/MIN/1.73 M^2
EST. GFR  (NON AFRICAN AMERICAN): >60 ML/MIN/1.73 M^2
FIO2: 50
FLUBV RNA NPH QL NAA+NON-PROBE: NOT DETECTED
GLUCOSE SERPL-MCNC: 116 MG/DL (ref 70–110)
GLUCOSE SERPL-MCNC: 123 MG/DL (ref 70–110)
GLUCOSE SERPL-MCNC: 128 MG/DL (ref 70–110)
GROUP A STREP, MOLECULAR: NEGATIVE
HCO3 UR-SCNC: 25.8 MMOL/L (ref 24–28)
HCT VFR BLD AUTO: 44 % (ref 40–54)
HCT VFR BLD CALC: 42 %PCV (ref 36–54)
HGB BLD-MCNC: 15.4 G/DL (ref 14–18)
HPIV1 RNA NPH QL NAA+NON-PROBE: NOT DETECTED
HPIV2 RNA NPH QL NAA+NON-PROBE: NOT DETECTED
HPIV3 RNA NPH QL NAA+NON-PROBE: DETECTED
HPIV4 RNA NPH QL NAA+NON-PROBE: NOT DETECTED
HUMAN METAPNEUMOVIRUS: NOT DETECTED
IMM GRANULOCYTES # BLD AUTO: 0.06 K/UL (ref 0–0.04)
IMM GRANULOCYTES NFR BLD AUTO: 0.4 % (ref 0–0.5)
INDIA INK PREP CSF: NORMAL
INFLUENZA A (SUBTYPES H1,H1-2009,H3): NOT DETECTED
INFLUENZA A, MOLECULAR: NEGATIVE
INFLUENZA B, MOLECULAR: NEGATIVE
INR PPP: 1.1
LACTATE SERPL-SCNC: 1.2 MMOL/L (ref 0.5–1.9)
LACTATE SERPL-SCNC: 3.9 MMOL/L (ref 0.5–1.9)
LYMPHOCYTES # BLD AUTO: 1.6 K/UL (ref 1–4.8)
LYMPHOCYTES NFR BLD: 11.7 % (ref 18–48)
MAGNESIUM SERPL-MCNC: 2.5 MG/DL (ref 1.6–2.6)
MCH RBC QN AUTO: 30 PG (ref 27–31)
MCHC RBC AUTO-ENTMCNC: 35 G/DL (ref 32–36)
MCV RBC AUTO: 86 FL (ref 82–98)
MIN VOL: 11.7
MODE: ABNORMAL
MONOCYTES # BLD AUTO: 1.2 K/UL (ref 0.3–1)
MONOCYTES NFR BLD: 8.5 % (ref 4–15)
MRSA SCREEN BY PCR: NEGATIVE
MYCOPLASMA PNEUMONIAE: NOT DETECTED
NEUTROPHILS # BLD AUTO: 10.9 K/UL (ref 1.8–7.7)
NEUTROPHILS NFR BLD: 78.5 % (ref 38–73)
NRBC BLD-RTO: 0 /100 WBC
PCO2 BLDA: 43 MMHG (ref 35–45)
PEEP: 5
PH SMN: 7.38 [PH] (ref 7.35–7.45)
PHOSPHATE SERPL-MCNC: 3.6 MG/DL (ref 2.7–4.5)
PIP: 18
PLATELET # BLD AUTO: 373 K/UL (ref 150–450)
PMV BLD AUTO: 9.5 FL (ref 9.2–12.9)
PO2 BLDA: 114 MMHG (ref 80–100)
POC BE: 1 MMOL/L
POC IONIZED CALCIUM: 1.23 MMOL/L (ref 1.06–1.42)
POC SATURATED O2: 98 % (ref 95–100)
POC TCO2: 27 MMOL/L (ref 23–27)
POTASSIUM BLD-SCNC: 4.4 MMOL/L (ref 3.5–5.1)
POTASSIUM SERPL-SCNC: 3.8 MMOL/L (ref 3.5–5.1)
POTASSIUM SERPL-SCNC: 4.1 MMOL/L (ref 3.5–5.1)
PROCALCITONIN SERPL IA-MCNC: 0.02 NG/ML
PROT SERPL-MCNC: 7.5 G/DL (ref 6–8.4)
PROTHROMBIN TIME: 13.6 SEC (ref 11.4–13.7)
RBC # BLD AUTO: 5.14 M/UL (ref 4.6–6.2)
RESPIRATORY INFECTION PANEL SOURCE: ABNORMAL
RSV RNA NPH QL NAA+NON-PROBE: NOT DETECTED
RV+EV RNA NPH QL NAA+NON-PROBE: NOT DETECTED
SAMPLE: ABNORMAL
SARS-COV-2 RNA RESP QL NAA+PROBE: NOT DETECTED
SITE: ABNORMAL
SODIUM BLD-SCNC: 140 MMOL/L (ref 136–145)
SODIUM SERPL-SCNC: 138 MMOL/L (ref 136–145)
SODIUM SERPL-SCNC: 139 MMOL/L (ref 136–145)
SP02: 100
SPECIMEN SOURCE: NORMAL
TRIGL SERPL-MCNC: 257 MG/DL (ref 30–150)
VT: 500
WBC # BLD AUTO: 13.88 K/UL (ref 3.9–12.7)

## 2022-04-25 PROCEDURE — 86652 ENCEPHALTIS EAST EQNE ANBDY: CPT

## 2022-04-25 PROCEDURE — 86789 WEST NILE VIRUS ANTIBODY: CPT

## 2022-04-25 PROCEDURE — 20000000 HC ICU ROOM

## 2022-04-25 PROCEDURE — 87633 RESP VIRUS 12-25 TARGETS: CPT | Performed by: FAMILY MEDICINE

## 2022-04-25 PROCEDURE — 87502 INFLUENZA DNA AMP PROBE: CPT | Performed by: FAMILY MEDICINE

## 2022-04-25 PROCEDURE — 87798 DETECT AGENT NOS DNA AMP: CPT | Performed by: FAMILY MEDICINE

## 2022-04-25 PROCEDURE — 99900026 HC AIRWAY MAINTENANCE (STAT)

## 2022-04-25 PROCEDURE — C9113 INJ PANTOPRAZOLE SODIUM, VIA: HCPCS | Performed by: INTERNAL MEDICINE

## 2022-04-25 PROCEDURE — 83036 HEMOGLOBIN GLYCOSYLATED A1C: CPT | Performed by: FAMILY MEDICINE

## 2022-04-25 PROCEDURE — 82010 KETONE BODYS QUAN: CPT | Performed by: FAMILY MEDICINE

## 2022-04-25 PROCEDURE — 93010 EKG 12-LEAD: ICD-10-PCS | Mod: ,,, | Performed by: INTERNAL MEDICINE

## 2022-04-25 PROCEDURE — 82803 BLOOD GASES ANY COMBINATION: CPT

## 2022-04-25 PROCEDURE — 99900031 HC PATIENT EDUCATION (STAT)

## 2022-04-25 PROCEDURE — 86140 C-REACTIVE PROTEIN: CPT | Performed by: FAMILY MEDICINE

## 2022-04-25 PROCEDURE — 94003 VENT MGMT INPAT SUBQ DAY: CPT

## 2022-04-25 PROCEDURE — 85014 HEMATOCRIT: CPT

## 2022-04-25 PROCEDURE — 87070 CULTURE OTHR SPECIMN AEROBIC: CPT | Performed by: FAMILY MEDICINE

## 2022-04-25 PROCEDURE — 87689 HC MISC. SEND OUT TEST: CPT

## 2022-04-25 PROCEDURE — 63600175 PHARM REV CODE 636 W HCPCS: Performed by: INTERNAL MEDICINE

## 2022-04-25 PROCEDURE — 83605 ASSAY OF LACTIC ACID: CPT | Mod: 91 | Performed by: INTERNAL MEDICINE

## 2022-04-25 PROCEDURE — 93010 ELECTROCARDIOGRAM REPORT: CPT | Mod: ,,, | Performed by: INTERNAL MEDICINE

## 2022-04-25 PROCEDURE — 84100 ASSAY OF PHOSPHORUS: CPT | Performed by: FAMILY MEDICINE

## 2022-04-25 PROCEDURE — 87205 SMEAR GRAM STAIN: CPT | Performed by: FAMILY MEDICINE

## 2022-04-25 PROCEDURE — 25000003 PHARM REV CODE 250: Performed by: INTERNAL MEDICINE

## 2022-04-25 PROCEDURE — 84132 ASSAY OF SERUM POTASSIUM: CPT

## 2022-04-25 PROCEDURE — 30000890 HC MISC. SEND OUT TEST

## 2022-04-25 PROCEDURE — 63600175 PHARM REV CODE 636 W HCPCS: Performed by: FAMILY MEDICINE

## 2022-04-25 PROCEDURE — 99291 PR CRITICAL CARE, E/M 30-74 MINUTES: ICD-10-PCS | Mod: ,,, | Performed by: INTERNAL MEDICINE

## 2022-04-25 PROCEDURE — 94799 UNLISTED PULMONARY SVC/PX: CPT

## 2022-04-25 PROCEDURE — C1751 CATH, INF, PER/CENT/MIDLINE: HCPCS

## 2022-04-25 PROCEDURE — 86654 ENCEPHALTIS WEST EQNE ANTBDY: CPT

## 2022-04-25 PROCEDURE — 87389 HIV-1 AG W/HIV-1&-2 AB AG IA: CPT | Performed by: FAMILY MEDICINE

## 2022-04-25 PROCEDURE — 84478 ASSAY OF TRIGLYCERIDES: CPT | Performed by: FAMILY MEDICINE

## 2022-04-25 PROCEDURE — 36600 WITHDRAWAL OF ARTERIAL BLOOD: CPT

## 2022-04-25 PROCEDURE — 86788 WEST NILE VIRUS AB IGM: CPT | Performed by: FAMILY MEDICINE

## 2022-04-25 PROCEDURE — 85730 THROMBOPLASTIN TIME PARTIAL: CPT | Performed by: FAMILY MEDICINE

## 2022-04-25 PROCEDURE — 80053 COMPREHEN METABOLIC PANEL: CPT | Performed by: FAMILY MEDICINE

## 2022-04-25 PROCEDURE — 30000890 LABCORP MISCELLANEOUS TEST: Performed by: FAMILY MEDICINE

## 2022-04-25 PROCEDURE — 30000890 MISCELLANEOUS SENDOUT TEST, BLOOD: Mod: 91 | Performed by: FAMILY MEDICINE

## 2022-04-25 PROCEDURE — 25000003 PHARM REV CODE 250: Performed by: FAMILY MEDICINE

## 2022-04-25 PROCEDURE — 83605 ASSAY OF LACTIC ACID: CPT | Performed by: FAMILY MEDICINE

## 2022-04-25 PROCEDURE — 80048 BASIC METABOLIC PNL TOTAL CA: CPT | Performed by: FAMILY MEDICINE

## 2022-04-25 PROCEDURE — 36415 COLL VENOUS BLD VENIPUNCTURE: CPT | Performed by: FAMILY MEDICINE

## 2022-04-25 PROCEDURE — 85025 COMPLETE CBC W/AUTO DIFF WBC: CPT | Performed by: FAMILY MEDICINE

## 2022-04-25 PROCEDURE — 93005 ELECTROCARDIOGRAM TRACING: CPT | Performed by: INTERNAL MEDICINE

## 2022-04-25 PROCEDURE — 36569 INSJ PICC 5 YR+ W/O IMAGING: CPT

## 2022-04-25 PROCEDURE — 86653 ENCEPHALTIS ST LOUIS ANTBODY: CPT

## 2022-04-25 PROCEDURE — 99900035 HC TECH TIME PER 15 MIN (STAT)

## 2022-04-25 PROCEDURE — 87641 MR-STAPH DNA AMP PROBE: CPT | Performed by: FAMILY MEDICINE

## 2022-04-25 PROCEDURE — 83735 ASSAY OF MAGNESIUM: CPT | Performed by: FAMILY MEDICINE

## 2022-04-25 PROCEDURE — 84295 ASSAY OF SERUM SODIUM: CPT

## 2022-04-25 PROCEDURE — 27000221 HC OXYGEN, UP TO 24 HOURS

## 2022-04-25 PROCEDURE — 82330 ASSAY OF CALCIUM: CPT

## 2022-04-25 PROCEDURE — 94761 N-INVAS EAR/PLS OXIMETRY MLT: CPT

## 2022-04-25 PROCEDURE — 85610 PROTHROMBIN TIME: CPT | Performed by: FAMILY MEDICINE

## 2022-04-25 PROCEDURE — 87651 STREP A DNA AMP PROBE: CPT | Performed by: FAMILY MEDICINE

## 2022-04-25 PROCEDURE — 99291 CRITICAL CARE FIRST HOUR: CPT | Mod: ,,, | Performed by: INTERNAL MEDICINE

## 2022-04-25 PROCEDURE — 86651 ENCEPHALITIS CALIFORN ANTBDY: CPT | Mod: 91 | Performed by: FAMILY MEDICINE

## 2022-04-25 PROCEDURE — 87086 URINE CULTURE/COLONY COUNT: CPT | Performed by: FAMILY MEDICINE

## 2022-04-25 RX ORDER — POTASSIUM CHLORIDE 7.45 MG/ML
20 INJECTION INTRAVENOUS
Status: DISCONTINUED | OUTPATIENT
Start: 2022-04-25 | End: 2022-04-29 | Stop reason: HOSPADM

## 2022-04-25 RX ORDER — MAGNESIUM SULFATE HEPTAHYDRATE 40 MG/ML
2 INJECTION, SOLUTION INTRAVENOUS
Status: DISCONTINUED | OUTPATIENT
Start: 2022-04-25 | End: 2022-04-29 | Stop reason: HOSPADM

## 2022-04-25 RX ORDER — POTASSIUM CHLORIDE 7.45 MG/ML
40 INJECTION INTRAVENOUS
Status: DISCONTINUED | OUTPATIENT
Start: 2022-04-25 | End: 2022-04-29 | Stop reason: HOSPADM

## 2022-04-25 RX ORDER — MUPIROCIN 20 MG/G
OINTMENT TOPICAL 2 TIMES DAILY
Status: DISCONTINUED | OUTPATIENT
Start: 2022-04-25 | End: 2022-04-29 | Stop reason: HOSPADM

## 2022-04-25 RX ORDER — PROPOFOL 10 MG/ML
0-50 INJECTION, EMULSION INTRAVENOUS CONTINUOUS
Status: DISCONTINUED | OUTPATIENT
Start: 2022-04-25 | End: 2022-04-26

## 2022-04-25 RX ORDER — ONDANSETRON 2 MG/ML
4 INJECTION INTRAMUSCULAR; INTRAVENOUS EVERY 6 HOURS PRN
Status: DISCONTINUED | OUTPATIENT
Start: 2022-04-25 | End: 2022-04-29 | Stop reason: HOSPADM

## 2022-04-25 RX ORDER — CHLORHEXIDINE GLUCONATE ORAL RINSE 1.2 MG/ML
15 SOLUTION DENTAL 2 TIMES DAILY
Status: DISCONTINUED | OUTPATIENT
Start: 2022-04-25 | End: 2022-04-29 | Stop reason: HOSPADM

## 2022-04-25 RX ORDER — FENTANYL CITRATE-0.9 % NACL/PF 10 MCG/ML
0-250 PLASTIC BAG, INJECTION (ML) INTRAVENOUS CONTINUOUS
Status: DISCONTINUED | OUTPATIENT
Start: 2022-04-25 | End: 2022-04-26

## 2022-04-25 RX ORDER — DEXMEDETOMIDINE HYDROCHLORIDE 4 UG/ML
0-1.4 INJECTION, SOLUTION INTRAVENOUS CONTINUOUS
Status: DISCONTINUED | OUTPATIENT
Start: 2022-04-25 | End: 2022-04-26

## 2022-04-25 RX ORDER — MAGNESIUM SULFATE 1 G/100ML
1 INJECTION INTRAVENOUS
Status: DISCONTINUED | OUTPATIENT
Start: 2022-04-25 | End: 2022-04-29 | Stop reason: HOSPADM

## 2022-04-25 RX ORDER — MAGNESIUM SULFATE HEPTAHYDRATE 40 MG/ML
4 INJECTION, SOLUTION INTRAVENOUS
Status: DISCONTINUED | OUTPATIENT
Start: 2022-04-25 | End: 2022-04-29 | Stop reason: HOSPADM

## 2022-04-25 RX ORDER — POTASSIUM CHLORIDE 20 MEQ/1
40 TABLET, EXTENDED RELEASE ORAL
Status: DISCONTINUED | OUTPATIENT
Start: 2022-04-25 | End: 2022-04-29 | Stop reason: HOSPADM

## 2022-04-25 RX ORDER — POTASSIUM CHLORIDE 20 MEQ/1
20 TABLET, EXTENDED RELEASE ORAL
Status: DISCONTINUED | OUTPATIENT
Start: 2022-04-25 | End: 2022-04-29 | Stop reason: HOSPADM

## 2022-04-25 RX ORDER — PANTOPRAZOLE SODIUM 40 MG/10ML
40 INJECTION, POWDER, LYOPHILIZED, FOR SOLUTION INTRAVENOUS 2 TIMES DAILY
Status: DISCONTINUED | OUTPATIENT
Start: 2022-04-25 | End: 2022-04-26

## 2022-04-25 RX ORDER — LANOLIN ALCOHOL/MO/W.PET/CERES
800 CREAM (GRAM) TOPICAL
Status: DISCONTINUED | OUTPATIENT
Start: 2022-04-25 | End: 2022-04-29 | Stop reason: HOSPADM

## 2022-04-25 RX ADMIN — ONDANSETRON 4 MG: 2 INJECTION INTRAMUSCULAR; INTRAVENOUS at 10:04

## 2022-04-25 RX ADMIN — LORAZEPAM 2 MG: 2 INJECTION INTRAMUSCULAR; INTRAVENOUS at 05:04

## 2022-04-25 RX ADMIN — CHLORHEXIDINE GLUCONATE 15 ML: 1.2 RINSE ORAL at 08:04

## 2022-04-25 RX ADMIN — PROPOFOL 40 MCG/KG/MIN: 10 INJECTION, EMULSION INTRAVENOUS at 03:04

## 2022-04-25 RX ADMIN — ACYCLOVIR SODIUM 680 MG: 500 INJECTION, SOLUTION INTRAVENOUS at 12:04

## 2022-04-25 RX ADMIN — PROPOFOL 50 MCG/KG/MIN: 10 INJECTION, EMULSION INTRAVENOUS at 10:04

## 2022-04-25 RX ADMIN — PANTOPRAZOLE SODIUM 40 MG: 40 INJECTION, POWDER, LYOPHILIZED, FOR SOLUTION INTRAVENOUS at 08:04

## 2022-04-25 RX ADMIN — SODIUM CHLORIDE: 0.9 INJECTION, SOLUTION INTRAVENOUS at 01:04

## 2022-04-25 RX ADMIN — FENTANYL CITRATE 50 MCG/HR: 50 INJECTION, SOLUTION INTRAMUSCULAR; INTRAVENOUS at 11:04

## 2022-04-25 RX ADMIN — CEFTRIAXONE 2 G: 2 INJECTION, SOLUTION INTRAVENOUS at 09:04

## 2022-04-25 RX ADMIN — PROPOFOL 50 MCG/KG/MIN: 10 INJECTION, EMULSION INTRAVENOUS at 06:04

## 2022-04-25 RX ADMIN — CEFTRIAXONE 2 G: 2 INJECTION, SOLUTION INTRAVENOUS at 08:04

## 2022-04-25 RX ADMIN — ACYCLOVIR SODIUM 680 MG: 500 INJECTION, SOLUTION INTRAVENOUS at 04:04

## 2022-04-25 RX ADMIN — ACYCLOVIR SODIUM 680 MG: 500 INJECTION, SOLUTION INTRAVENOUS at 08:04

## 2022-04-25 RX ADMIN — MUPIROCIN: 20 OINTMENT TOPICAL at 10:04

## 2022-04-25 RX ADMIN — PROPOFOL 50 MCG/KG/MIN: 10 INJECTION, EMULSION INTRAVENOUS at 03:04

## 2022-04-25 RX ADMIN — VANCOMYCIN HYDROCHLORIDE 1000 MG: 1 INJECTION, POWDER, LYOPHILIZED, FOR SOLUTION INTRAVENOUS at 12:04

## 2022-04-25 RX ADMIN — DEXMEDETOMIDINE HYDROCHLORIDE 1 MCG/KG/HR: 400 INJECTION INTRAVENOUS at 04:04

## 2022-04-25 RX ADMIN — VANCOMYCIN HYDROCHLORIDE 500 MG: 500 INJECTION, POWDER, LYOPHILIZED, FOR SOLUTION INTRAVENOUS at 04:04

## 2022-04-25 RX ADMIN — VANCOMYCIN HYDROCHLORIDE 1500 MG: 1.5 INJECTION, POWDER, LYOPHILIZED, FOR SOLUTION INTRAVENOUS at 05:04

## 2022-04-25 RX ADMIN — PANTOPRAZOLE SODIUM 40 MG: 40 INJECTION, POWDER, LYOPHILIZED, FOR SOLUTION INTRAVENOUS at 10:04

## 2022-04-25 RX ADMIN — CHLORHEXIDINE GLUCONATE 15 ML: 1.2 RINSE ORAL at 10:04

## 2022-04-25 RX ADMIN — PROPOFOL 50 MCG/KG/MIN: 10 INJECTION, EMULSION INTRAVENOUS at 12:04

## 2022-04-25 RX ADMIN — PROMETHAZINE HYDROCHLORIDE 6.25 MG: 25 INJECTION INTRAMUSCULAR; INTRAVENOUS at 10:04

## 2022-04-25 RX ADMIN — MUPIROCIN: 20 OINTMENT TOPICAL at 08:04

## 2022-04-25 NOTE — NURSING
Patient aroused awake by stimulation of applying ALEX hoses to legs and caregivers talking in his room and sat up in bed, patient easily redirected and when challenged if he can hear and understand direction he nodded his head yes. Patient challenged to follow commands of showing a thumbs up and began to do this but then patient began coughing incessantly and became agitated and sedation was increased at this time.

## 2022-04-25 NOTE — CONSULTS
GASTROENTEROLOGY INPATIENT CONSULT NOTE  Patient Name: Iván Amaral  Patient MRN: [id number]  Patient : 1985    Admit Date: 2022  Service date: 2022    Reason for Consult: coffee ground emesis    PCP: Hugo Moran MD    Chief Complaint   Patient presents with    Seizures       HPI: Patient is a 37 y.o. male with recent h/o TIA and recent headaches admitted with altered mental status and meningitis.  Pt intubated and sedated.  OG tube put out dark contents today.  Currently being weaned for extubation. Hgb stable this AM from yesterday.  Mother reports using aspirin regularly.      Past Medical History:  Past Medical History:   Diagnosis Date    Acute gout of left foot 2020    Gout 2019    Hyperlipidemia     Stomach ulcer         Past Surgical History:  Past Surgical History:   Procedure Laterality Date    NASAL SEPTUM SURGERY          Home Medications:  Medications Prior to Admission   Medication Sig Dispense Refill Last Dose    allopurinoL (ZYLOPRIM) 300 MG tablet Take 1 tablet (300 mg total) by mouth once daily. 90 tablet 3 Unknown at Unknown time    aspirin (ECOTRIN) 81 MG EC tablet Take 1 tablet (81 mg total) by mouth once daily. 90 tablet 1 Unknown at Unknown time    atorvastatin (LIPITOR) 40 MG tablet Take 1 tablet (40 mg total) by mouth once daily. 90 tablet 1 Unknown at Unknown time    benzonatate (TESSALON) 100 MG capsule Take 100 mg by mouth 3 (three) times daily as needed for Cough.   Unknown at Unknown time    buPROPion (WELLBUTRIN SR) 150 MG TBSR 12 hr tablet Take 1 tablet (150 mg total) by mouth 2 (two) times daily. 60 tablet 11 Unknown at Unknown time    fluticasone propionate (FLONASE) 50 mcg/actuation nasal spray 1 spray (50 mcg total) by Each Nostril route once daily. 16 g 3 Unknown at Unknown time    multivitamin (THERAGRAN) per tablet Take 1 tablet by mouth once daily.   Unknown at Unknown time       Inpatient Medications:   acyclovir  10 mg/kg  (Ideal) Intravenous Q8H    cefTRIAXone (ROCEPHIN) IVPB  2 g Intravenous Q12H    chlorhexidine  15 mL Mouth/Throat BID    lorazepam  2 mg Intravenous Q6H    mupirocin   Nasal BID    pantoprazole  40 mg Intravenous BID    vancomycin (VANCOCIN) IVPB  1,500 mg Intravenous Q12H     albuterol-ipratropium, calcium chloride IVPB, calcium chloride IVPB, calcium chloride IVPB, dextrose 10%, dextrose 10%, dextrose 50%, dextrose 50%, glucagon (human recombinant), glucose, glucose, hydrALAZINE, hydrALAZINE, lorazepam, magnesium oxide, magnesium sulfate IVPB, magnesium sulfate IVPB, magnesium sulfate IVPB, magnesium sulfate IVPB, naloxone, potassium chloride in water, potassium chloride in water, potassium chloride in water, potassium chloride in water, potassium chloride, potassium chloride, potassium chloride, potassium chloride, sodium chloride 0.9%, sodium phosphate IVPB, sodium phosphate IVPB, sodium phosphate IVPB, sodium phosphate IVPB, sodium phosphate IVPB, Pharmacy to dose Vancomycin consult **AND** vancomycin - pharmacy to dose    Review of patient's allergies indicates:   Allergen Reactions    Pcn [penicillins]        Social History:   Social History     Occupational History    Not on file   Tobacco Use    Smoking status: Never Smoker    Smokeless tobacco: Never Used   Substance and Sexual Activity    Alcohol use: No    Drug use: No    Sexual activity: Not Currently       Family History:   Family History   Problem Relation Age of Onset    Diabetes Mother     Hypothyroidism Mother     Diabetes Father     Gout Father     Heart failure Father     Heart disease Father     Hypoglycemic Brother     Cancer Maternal Grandfather        Review of Systems:  A 10 point review of systems was performed and was normal, except as mentioned in the HPI, including constitutional, HEENT, heme, lymph, cardiovascular, respiratory, gastrointestinal, genitourinary, neurologic, endocrine, psychiatric and  "musculoskeletal.      OBJECTIVE:    Physical Exam:  24 Hour Vital Sign Ranges: Temp:  [97.5 °F (36.4 °C)-98.6 °F (37 °C)] 98.5 °F (36.9 °C)  Pulse:  [] 81  Resp:  [8-41] 20  SpO2:  [93 %-100 %] 97 %  BP: ()/() 87/54  Most recent vitals: BP (!) 87/54   Pulse 81   Temp 98.5 °F (36.9 °C)   Resp 20   Ht 6' 2" (1.88 m)   Wt 99.3 kg (218 lb 14.7 oz)   SpO2 97%   BMI 28.11 kg/m²    GEN: intubated, agitated and writhing in bed.    HEENT: PERRL, sclera anicteric, oral mucosa pink and moist without lesion, ETT in place  NECK: trachea midline; Good ROM  CV: regular rate and rhythm, no murmurs or gallops  RESP: clear to auscultation bilaterally, no wheezes, rhonci or rales  ABD: soft, non-tender, non-distended, normal bowel sounds  EXT: no swelling or edema, 2+ pulses distally  SKIN: no rashes or jaundice       Labs:   Recent Labs     04/24/22 1911 04/25/22  0343   WBC 17.27* 13.88*   MCV 85 86   * 373     Recent Labs     04/24/22 1911 04/25/22  0009 04/25/22  0343   * 139 138   K 4.6 3.8 4.1    102 103   CO2 21* 23 24   BUN 13 15 13   * 128* 123*     No results for input(s): ALB in the last 72 hours.    Invalid input(s): ALKP, SGOT, SGPT, TBIL, DBIL, TPRO  Recent Labs     04/25/22  0343   INR 1.1         Radiology Review:  MRI Brain W WO Contrast         X-Ray Chest AP Portable   Final Result      X-Ray Chest 1 View   Final Result      CT Head Without Contrast   Final Result      X-Ray Chest AP Portable   Final Result      X-Ray Chest AP Portable    (Results Pending)         IMPRESSION / RECOMMENDATIONS:  Question of hematemesis  -ppi bid  -monitor h/h for now  -pt currently being weaned for extubation.    -dark bile in the OG cannister  -EGD if dropping h/h or overt bleeding  -will follow.    Thank you for this consult.    Tacho Medina  4/25/2022  4:58 PM        "

## 2022-04-25 NOTE — CONSULTS
Consult Note  Infectious Disease    Reason for Consult:  Meningitis    HPI: Iván Amaral is a 37 y.o. male Was recently hospitalized as 4/18 until 4/19 with transient numbness, tingling of the right hand with clumsiness, right leg weakness and right-sided facial weakness.  CT scan of the head done in the emergency room showed a possible small subarachnoid hemorrhage.  He was seen by Neurology and had an MRI suggesting no subarachnoid hemorrhage.  He was placed on lipid-lowering agent, aspirin has for TIA and released.  He was brought back to the emergency room yesterday by EMS because of unresponsive state, staring to the left lasting approximately 45-60 seconds with subsequent difficulty swallowing and with expressive aphasia.  The ER record states that family noted him to be hyperventilating and diaphoretic.  The patient apparently stated that he took 2 Prozac pills and in the emergency room he was tachycardic and hypertensive.  Repeat CT head showed no acute intracranial pathology but underlying paranasal sinus disease.  History and physical states that mother reported recurrence of headache with paresthesias on 04/20 and these resolved as well.  His mother who is a nurse had came to stay with him, and noted a lingering cough and the patient did have an upper respiratory infection a few weeks ago and his roommate recently had COVID.  Mother also reported to admitting physician that his home environment is unclean, there are cats, mold and trash everywhere.  In the emergency room he was intubated/sedated due to erratic and unsafe behavior, a lumbar puncture was performed demonstrating 2900 red blood cells and 225 white blood cells 88% lymphocytes with a very elevated protein of 186. .  Serotonin syndrome was suspected as well.  He was begun on ceftriaxone vancomycin and  IV acyclovir and admitted to the intensive care unit    Lactic acid was 14, white blood cells 17,000, creatinine 1.5, glucose 213, CPK  was 592  He has no fever and WBC are improved, heart rate is less than 100, BP still elevated, seem to have some posturing. HIV negative 2020.     Review of patient's allergies indicates:   Allergen Reactions    Pcn [penicillins]      Past Medical History:   Diagnosis Date    Gout 2019    Hyperlipidemia     Stomach ulcer    TIA? 04/18/2022  Depression and anxiety    Past Surgical History:   Procedure Laterality Date    NASAL SEPTUM SURGERY       Social History     Socioeconomic History    Marital status: Single   Tobacco Use    Smoking status: Never Smoker    Smokeless tobacco: Never Used   Substance and Sexual Activity    Alcohol use: No    Drug use: No    Sexual activity: Not Currently     Social Determinants of Health     Financial Resource Strain: Low Risk     Difficulty of Paying Living Expenses: Not hard at all   Food Insecurity: No Food Insecurity    Worried About Running Out of Food in the Last Year: Never true    Ran Out of Food in the Last Year: Never true   Transportation Needs: No Transportation Needs    Lack of Transportation (Medical): No    Lack of Transportation (Non-Medical): No   Physical Activity: Sufficiently Active    Days of Exercise per Week: 5 days    Minutes of Exercise per Session: 60 min   Stress: Stress Concern Present    Feeling of Stress : To some extent   Social Connections: Unknown    Frequency of Communication with Friends and Family: Twice a week    Frequency of Social Gatherings with Friends and Family: Once a week    Active Member of Clubs or Organizations: No    Attends Club or Organization Meetings: Never    Marital Status: Never    Housing Stability: Low Risk     Unable to Pay for Housing in the Last Year: No    Number of Places Lived in the Last Year: 1    Unstable Housing in the Last Year: No     Family History   Problem Relation Age of Onset    Diabetes Mother     Hypothyroidism Mother     Diabetes Father     Gout Father     Heart  failure Father     Heart disease Father     Hypoglycemic Brother     Cancer Maternal Grandfather          Review of Systems:  Unobtainable  Spoke with his mother, and the following is from her report  He works in IT, he works here  No outside hobbies, but lots of mosquitoes outside. Unsure if he had any bites  No recent travel and mother denies recent swimming  Occasional HSV on lips, mother unaware of recent outbreak and he has taken acyclovir in the past.   He still has a cough from URI 5-6 weeks ago. She denies he was complaining of sinusitis symptoms.   He had headaches last week, hearing humming in his head.  BP was mildly elevated at home. Sharp on one side and throbbing on the other. He did not allow her to take his temperature. He did have occasional cold sweats, yesterday and the day before.   She reports that his thought processes were not completely normal but for the most part ok. Not like anxiety, but more cognitive and a sense of doom.   Mother does not believe he is involved in an intimate relationship but has had them in the past.         EXAM & DIAGNOSTICS REVIEWED:   Vitals:     Temp:  [97.5 °F (36.4 °C)-98 °F (36.7 °C)]   Temp: 98 °F (36.7 °C) (04/25/22 0500)  Pulse: 89 (04/25/22 0645)  Resp: 20 (04/25/22 0645)  BP: 138/78 (04/25/22 0645)  SpO2: 100 % (04/25/22 0645)    Intake/Output Summary (Last 24 hours) at 4/25/2022 0722  Last data filed at 4/25/2022 0500  Gross per 24 hour   Intake 919.75 ml   Output 800 ml   Net 119.75 ml       General:  Sedated on the ventilator  Eyes:  Anicteric, pupils small but reactive  ENT:  No ulcers, exudates, thrush, nares patent, dentition is very good  Neck:  Completely supple, no masses or adenopathy appreciated  Lungs: Clear, no consolidation, rales, wheezes, rub  Heart:  RRR, no gallop/murmur/rub noted  Abd:  Soft, NT, ND, normal BS, no masses or organomegaly appreciated.  No perirectal or perianal lesions  :   [place], urine clear, no flank tenderness, no  genital lesions  Musc:  Joints without effusion, swelling, erythema, synovitis, muscle wasting.   Skin:  No rashes. No palmar or plantar lesions. No subungual petechiae. No track marks  Neuro:  sedated on propofol. Moves all extremities, ?possible decerebrate posturing UE. Flexes right leg to tuck under left.     Psych:   Unable to assess  Lymphatic:     No cervical, supraclavicular, axillary, or inguinal nodes  Extrem: No edema, erythema, phlebitis, cellulitis, warm and well perfused  VAD:       Isolation:  none  Wound: none      Lines/Tubes/Drains:    General Labs reviewed:  Recent Labs   Lab 04/19/22  0604 04/24/22 1911 04/24/22 2031 04/25/22  0045 04/25/22  0343   WBC 5.47 17.27*  --   --  13.88*   HGB 14.9 16.7  --   --  15.4   HCT 42.1 48.5 46 42 44.0    622*  --   --  373       Recent Labs   Lab 04/19/22  0604 04/24/22 1911 04/25/22  0009 04/25/22  0343   * 147* 139 138   K 3.9 4.6 3.8 4.1    106 102 103   CO2 24 21* 23 24   BUN 13 13 15 13   CREATININE 1.1 1.5* 1.3 1.3   CALCIUM 8.3* 10.0 9.3 9.1   PROT 7.2 8.9*  --  7.5   BILITOT 0.6 0.6  --  0.5   ALKPHOS 147* 158*  --  132   ALT 46* 27  --  25   AST 24 29  --  31     No results for input(s): CRP in the last 168 hours.     Latest Reference Range & Units 04/24/22 22:00   COLOR CSF Colorless  Red !   Heme Aliquot mL 3.0   Appearance, CSF Clear  Bloody !   RBC, CSF 0 /cu mm 2965 !   WBC, CSF 0 - 5 /cu mm 225 (HH) [1]   Segmented Neutrophils, CSF 0 - 6 % 2   Lymphs, CSF 40 - 80 % 88 (H)   Mono/Macrophage, CSF 15 - 45 % 10 (L)   Glucose, CSF 40 - 70 mg/dL 80 (H) [2]   Protein, CSF 15 - 40 mg/dL 186 (H) [3]       Micro:  Microbiology Results (last 7 days)     Procedure Component Value Units Date/Time    MRSA Screen by PCR [[id number]] Collected: 04/25/22 0347    Order Status: Completed Specimen: Nasopharyngeal Swab from Nasal Updated: 04/25/22 0559     MRSA SCREEN BY PCR Negative    Group A Strep, Molecular [[id number]] Collected: 04/25/22  0347    Order Status: Completed Specimen: Throat Updated: 04/25/22 0459     Group A Strep, Molecular Negative     Comment: Arcanobacterium haemolyticum and Beta Streptococcus group C   and G will not be detected by this test method.  Please order   Throat Culture ([id number]) if suspected.         Respiratory Infection Panel (PCR), Nasopharyngeal [[id number]] Collected: 04/25/22 0347    Order Status: Completed Updated: 04/25/22 0458     Respiratory Infection Panel Source NP swab    Narrative:      Specimen Source->Nasopharyngeal Swab    Resp Viral Panel PCR, Peds Under 7 Months Nasopharyngeal Swab [[id number]] Collected: 04/25/22 0347    Order Status: Canceled     Blood culture [[id number]] Collected: 04/24/22 1920    Order Status: Completed Specimen: Blood from Peripheral, Antecubital, Right Updated: 04/25/22 0317     Blood Culture, Routine No Growth to date    Blood culture [[id number]] Collected: 04/24/22 1925    Order Status: Completed Specimen: Blood from Peripheral, Antecubital, Right Updated: 04/25/22 0317     Blood Culture, Routine No Growth to date    Urine Culture High Risk [[id number]] Collected: 04/25/22 0143    Order Status: Sent Specimen: Urine, Catheterized Updated: 04/25/22 0209    Culture, Respiratory with Gram Stain [[id number]] Collected: 04/25/22 0000    Order Status: Completed Specimen: Respiratory from Endotracheal Aspirate Updated: 04/25/22 0059     Gram Stain (Respiratory) <10 epithelial cells per low power field.     Gram Stain (Respiratory) Many WBC's     Gram Stain (Respiratory) Many Gram positive cocci     Gram Stain (Respiratory) Few Gram negative rods    CSF culture and Gram Stain (Tube 2) [[id number]] Collected: 04/24/22 2200    Order Status: Completed Specimen: CSF (Spinal Fluid) from CSF Tap, Tube 2 Updated: 04/24/22 2331     Gram Stain Result Few WBC's      No organisms seen    Narrative:      On which sequentially labeled tube should this analysis be  performed?->2          Imaging  Reviewed:   CXR   CT head   IMPRESSION:  1. No acute intracranial pathology.  2. Underlying paranasal sinus disease.    Cardiology:  TTE 4/19  · The left ventricle is normal in size with concentric remodeling and normal systolic function.  · The estimated ejection fraction is 65%.  · Normal left ventricular diastolic function.  · There is no evidence of intracardiac shunting. Bubble study negative for PFO  · Atrial fibrillation not observed.  · Normal right ventricular size with normal right ventricular systolic function.  · Normal central venous pressure (3 mmHg).  · The estimated PA systolic pressure is 11 mmHg.         IMPRESSION & PLAN   1. Meningoencephalitis   Cell count/diff suggests non bacterial etiology   HIV negative 2020 makes fungal and opportunistic meningoencephalitis much less likely    2. Recent TIA symptoms, negative MRI  3. Paranasal sinus disease, more on 4/24 CT than last week  4. ?serotonin syndrome      Recommendations:  Continue vanc, rocephin, acyclovir  Adding meningoencephalitis pcr panel, cryptococcal antigen, HIV Ab/Ag, arbovirus antibody panel, West Nile Ab,      and will try to reach his mother (the phone number in system is not correct?) to assess potential environmental Exposures  Trend lactic acid  Isolation not needed  Anticipating EEG and possible MRI brain?    [phone] mother    I have spent > 35 minutes providing critical care services for this pt for the above diagnoses.  These services have included pt evaluation, pt exam, discussions with staff, family, chart review, data review, note preparation and .  The patient has life threatening illness with a high risk of decompensation and/or death.    Medical Decision Making during this encounter was  [_] Low Complexity  [_] Moderate Complexity  [  ] High Complexity

## 2022-04-25 NOTE — H&P
[facility] Medicine   History & Physical   Patient Name: Iván Amaral  MRN: [id number]  Admission Date: 4/24/2022  5:57 PM  Attending Physician: Elle Burton MD  Primary Care Provider: Hugo Moran MD  Face-to-Face encounter date: 04/25/2022    Patient information was obtained from patient, past medical records, ER physician, and ER records.     HISTORY OF PRESENT ILLNESS:     Iván Amaral is a 37 y.o. White male   With PMH of gastritis, HLD, gout,  who presents with AMS.    Pt recently discharged from [facility] on 4/19/22,  He was evaluated for headaches with R paraesthesia   There was initially concern for SAH on CT head  However MRI Brain was without SAH  Pt was evaluated by neurology during inpt stay  Pt was treated as TIA  All neurological deficits had resolved by discharge  EEG was to be done outpt basis    Pt is currently intubated on MV  Pt's mother at bedside supplements history  Pt had a reoccurrence of headache with paraesthesias on Wed  He called nursing line, was encouraged to follow-up with his PCP  The symptoms self resolved    Pt continued to feel unwell but could not pinpoint why  He was afraid and asked his mother to monitor him  She is an ER nurse in [place] and came to stay with him  She reports episodes of diaphoresis and pallor  Pt would not let her take his temperature  VS were otherwise stable and pt remained at mental status baseline  Pt's mother also notes a lingering dry cough  He had an URI a few weeks ago  Pt's roommate recently had COVID    Pt eventually sent his mother home--she reports she didn't get around the corner  He called her almost immediately with slurred speech  When she returned to him, he was on the couch unable to move   He was also experiencing a feeling of impending doom  They returned to ER with EMS  EMS reports a staring episode with L gaze that lasted approx 45 seconds  They report he was anxious, excitable,  diaphoretic  Began to experience difficulty swallowing + aphasia    Pt became increasingly agitated and combative in the ER  HR 150s  BP 220s/110s  Pt was sedated, and LP done in ER  Pt currently intubated on MV  Beforehand he told ER physician he took 2 prozac  His mother reports she isn't sure if this is true  Pt no longer takes prozac, he takes welbutrin  Pt is also usually very careful with his medication  He always asks her before mixing any medication  He is usually laid back and without anxiety as well  It is unusual for him to be anxious or agitated    Per pt's mother, pt lives in a filthy house with 3 other men  With cats  There is mold and trash everywhere  She reports they are hoarders    REVIEW OF SYSTEMS:     All systems reviewed and are negative except as noted per above.    PAST MEDICAL HISTORY:     Past Medical History:   Diagnosis Date    Gout 2019    Hyperlipidemia     Stomach ulcer        PAST SURGICAL HISTORY:     Past Surgical History:   Procedure Laterality Date    NASAL SEPTUM SURGERY         ALLERGIES:   Pcn [penicillins]    FAMILY HISTORY:     Family History   Problem Relation Age of Onset    Diabetes Mother     Hypothyroidism Mother     Diabetes Father     Gout Father     Heart failure Father     Heart disease Father     Hypoglycemic Brother     Cancer Maternal Grandfather        SOCIAL HISTORY:     Social History     Tobacco Use    Smoking status: Never Smoker    Smokeless tobacco: Never Used   Substance Use Topics    Alcohol use: No        Social History     Substance and Sexual Activity   Sexual Activity Not Currently        HOME MEDICATIONS:     Prior to Admission medications    Medication Sig Start Date End Date Taking? Authorizing Provider   allopurinoL (ZYLOPRIM) 300 MG tablet Take 1 tablet (300 mg total) by mouth once daily. 1/14/22   Hugo Moran MD   aspirin (ECOTRIN) 81 MG EC tablet Take 1 tablet (81 mg total) by mouth once daily. 4/19/22 4/19/23  Chandana GIL  "MD Chirag   atorvastatin (LIPITOR) 40 MG tablet Take 1 tablet (40 mg total) by mouth once daily. 4/20/22 4/20/23  Chandana Beard MD   benzonatate (TESSALON) 100 MG capsule Take 100 mg by mouth 3 (three) times daily as needed for Cough.    Historical Provider   buPROPion (WELLBUTRIN SR) 150 MG TBSR 12 hr tablet Take 1 tablet (150 mg total) by mouth 2 (two) times daily. 1/14/22 1/14/23  Hugo Moran MD   fluticasone propionate (FLONASE) 50 mcg/actuation nasal spray 1 spray (50 mcg total) by Each Nostril route once daily. 3/22/22   Hugo Moran MD   multivitamin (THERAGRAN) per tablet Take 1 tablet by mouth once daily.    Historical Provider   pravastatin (PRAVACHOL) 10 MG tablet Take 10 mg by mouth once daily.  4/19/22  Historical Provider       PHYSICAL EXAM:     /79   Pulse 73   Temp 97.5 °F (36.4 °C) (Oral)   Resp 20   Ht 6' 2" (1.88 m)   Wt 99.3 kg (218 lb 14.7 oz)   SpO2 100%   BMI 28.11 kg/m²     Gen: sedated, intubated on MV  HEENT:  Eyes - no pallor  External ears with no lesions  Nares patent  Mouth - intubated  CV: RRR  Lungs: COARSE  Abd: +BS, soft, NT, ND  Ext: no atrophy or edema  Skin: warm, diaphoretic  Neuro: sedated  Psych:  sedated    LABS AND IMAGING:     Labs Reviewed   CBC W/ AUTO DIFFERENTIAL - Abnormal; Notable for the following components:       Result Value    WBC 17.27 (*)     Platelets 622 (*)     Immature Granulocytes 0.7 (*)     Gran # (ANC) 10.7 (*)     Immature Grans (Abs) 0.12 (*)     Lymph # 5.1 (*)     Mono # 1.1 (*)     All other components within normal limits   COMPREHENSIVE METABOLIC PANEL - Abnormal; Notable for the following components:    Sodium 147 (*)     CO2 21 (*)     Glucose 213 (*)     Creatinine 1.5 (*)     Total Protein 8.9 (*)     Alkaline Phosphatase 158 (*)     Anion Gap 20 (*)     eGFR if non  58.2 (*)     All other components within normal limits   DRUG SCREEN PANEL, URINE EMERGENCY - Abnormal; Notable for the following " components:    Creatinine, Urine 532.0 (*)     All other components within normal limits    Narrative:     Specimen Source->Urine   LACTIC ACID, PLASMA - Abnormal; Notable for the following components:    Lactate (Lactic Acid) 14.2 (*)     All other components within normal limits    Narrative:     LACTIC ACID  critical result(s) called and verbal readback obtained   from DI ANDERSEN RN ER. by TC1 04/24/2022 21:00   SALICYLATE LEVEL - Abnormal; Notable for the following components:    Salicylate Lvl <4.0 (*)     All other components within normal limits   GLUCOSE, CSF - Abnormal; Notable for the following components:    Glucose, CSF 80 (*)     All other components within normal limits    Narrative:     Specimen Tube Number->1   PROTEIN, CSF - Abnormal; Notable for the following components:    Protein,  (*)     All other components within normal limits    Narrative:     Specimen Tube Number->1   CK - Abnormal; Notable for the following components:     (*)     All other components within normal limits   CK - Abnormal; Notable for the following components:     (*)     All other components within normal limits   ISTAT PROCEDURE - Abnormal; Notable for the following components:    POC PH 7.305 (*)     POC PO2 113 (*)     POC HCO3 20.8 (*)     POC Glucose 235 (*)     POC TCO2 22 (*)     All other components within normal limits   CULTURE, CSF  (INCLUDES STAIN)    Narrative:     On which sequentially labeled tube should this analysis be  performed?->2   TROPONIN I   B-TYPE NATRIURETIC PEPTIDE   SARS-COV-2 RNA AMPLIFICATION, QUAL   TROPONIN I   PROCALCITONIN   ALCOHOL,MEDICAL (ETHANOL)   HERPES SIMPLEX (HSV) BY RAPID PCR, NON-BLOOD   PROTIME-INR   APTT   URINALYSIS MICROSCOPIC   URINALYSIS, REFLEX TO URINE CULTURE     Imaging Results          CT Head Without Contrast (Final result)  Result time 04/24/22 20:15:44    Final result by Tacho Shultz MD (04/24/22 20:15:44)                 Narrative:     Head CT scan without contrast    COMPARISONS: CTA head and neck dated April 18, 2022    ADDITIONAL PERTINENT HISTORY: New onset seizure    TECHNIQUE:  Multiple axial images were obtained from the skull base to the vertex without IV contrast. One of the following dose optimization techniques was utilized in the performance of this exam: Automated exposure control; adjustment of the mA and/or kV according to the patient's size; or use of an iterative  reconstruction technique.  Specific details can be referenced in the facility's radiology CT exam operational policy.    FINDINGS:    Midline shift: Negative    Ventricles:  Negative    Brain parenchyma:  Negative    Extra-axial spaces:  Negative    Intracranial vasculature:  Negative    Osseous structures:  Negative    Paranasal sinuses and mastoid air cells:  'S marked mucosal thickening involving both maxillary sinuses as well as the sphenoid sinuses and ethmoid air cells.    Surrounding soft tissues and orbits:  Negative      IMPRESSION:  1. No acute intracranial pathology.  2. Underlying paranasal sinus disease.    Electronically signed by:  Tacho Shultz MD  4/24/2022 8:15 PM CDT Workstation: [id number]                             X-Ray Chest AP Portable (Final result)  Result time 04/24/22 19:46:26    Final result by Semaus Mercado MD (04/24/22 19:46:26)                 Narrative:    HISTORY: Chest Pain    FINDINGS: Portable chest radiograph at 1921 hours compared to 04/18/2020 shows interval insertion of a nasogastric tube, with the distal tip overlying the proximal gastric body, and the proximal port near the expected level of the gastroesophageal junction. The cardiomediastinal silhouette and pulmonary vasculature are stable and within normal limits.    The lungs are hypoexpanded, with parahilar bandlike and irregular opacities suggesting atelectasis. No consolidation, large pleural effusion, evidence of pulmonary edema, or pneumothorax. No acute fractures or  destructive osseous lesions.    IMPRESSION:  1. Nasogastric tube as described.  2. Pulmonary hypoinflation, with perihilar opacities suggesting atelectasis.    Electronically signed by:  Seamus Mercado MD  4/24/2022 7:46 PM CDT Workstation: [id number]                                ASSESSMENT & PLAN:   Iván Amaral is a 37 y.o. male admitted for    Active Hospital Problems    Diagnosis  POA    *Meningitis [G03.9]  Yes    Serotonin syndrome [G25.79]  Yes    Depression with anxiety [F41.8]  Yes      Resolved Hospital Problems   No resolved problems to display.        Plan    Metabolic Encephalopathy  Suspected meningitis  Suspected serotonin syndrome  - repeat CT head negative  - ER discussed with neurology and poison control  - Goal QTC < 450:  Magnesium given.  - LP done in ER, results in progress  - monitor in ICU  - continue acyclovir, rocephin, vanc  - scheduled ativan  - IVFs  - ID, neurology, and critical care consulted, thank you    Chronic conditions as noted above/below; home medications reviewed personally by me and restarted as appropriate  Electrolyte derangement:  Trending BMP; Mg; replacement prn  DVT ppx: lovenox held for recent LP  FULL CODE    Elle Burton MD  [facility] Hospitalist  04/25/2022

## 2022-04-25 NOTE — PLAN OF CARE
Problem: Nutrition Impairment (Mechanical Ventilation, Invasive)  Goal: Optimal Nutrition Delivery  Outcome: Ongoing, Progressing  Intervention: Optimize Nutrition Delivery  Flowsheets (Taken 4/25/2022 1008)  Nutrition Support Management: tube feeding initiated

## 2022-04-25 NOTE — PLAN OF CARE
04/24/22 5230   Patient Assessment/Suction   Level of Consciousness (AVPU) responds to voice   Respiratory Effort Normal;Unlabored   Expansion/Accessory Muscles/Retractions no use of accessory muscles   Rhythm/Pattern, Respiratory assisted mechanically   Cough Frequency with stimulation   PRE-TX-O2   O2 Device (Oxygen Therapy) ventilator   $ Is the patient on Low Flow Oxygen? Yes   Oxygen Concentration (%) 50   SpO2 100 %   Pulse Oximetry Type Continuous   $ Pulse Oximetry - Multiple Charge Pulse Oximetry - Multiple   Vital Capacity   Vital Capacity (mL) 0   Skin Integrity   $ Wound Care Tech Time 15 min   Area Observed Upper lip;Lower lip;Corner lip   Skin Appearance without discoloration        Airway - Non-Surgical 04/24/22 1920 Endotracheal Tube   Placement Date/Time: 04/24/22 1920   Method of Intubation: Glidescope  Inserted by: MD  Staff/Resident Name(s): Mercsondra  Airway Device: Endotracheal Tube  Airway Device Size: 7.5  Placement Verified By: Auscultation;Capnometry;Chest X-ray;Colorimetri...   Secured at 23 cm   Measured At Lips   Secured Location Center   Secured by Commercial tube farmer   Bite Block center   Site Condition Cool;Dry   Status Intact;Secured;Patent   Site Assessment Clean;Dry;No bleeding;No drainage   Cuff Pressure   (mlt)   Vent Select   Conventional Vent Y   Charged w/in last 24h YES   Preset Conventional Ventilator Settings   Vent ID [id number]   Vent Type    Ventilation Type VC   Vent Mode A/C   Humidity HME   Set Rate 20 BPM   Vt Set 500 mL   PEEP/CPAP 5 cmH20   Peak Flow 60 L/min   Peak End Inspiratory Pressure 16 cmH20   I-Trigger Type  V-TRIG   Trigger Sensitivity Flow/I-Trigger 3 L/min   Patient Ventilator Parameters   Resp Rate Total 20 br/min   Peak Airway Pressure 21 cmH2O   Mean Airway Pressure 9.5 cmH20   Plateau Pressure 0 cmH20   Exhaled Vt 494 mL   Total Ve 9.88 mL   I:E Ratio Measured 1:2.30   Auto PEEP 0 cmH20   Tubing ID (mm) 7.5 mm   Tube Type ET   Conventional  Ventilator Alarms   Alarms On Y   Resp Rate High Alarm 40 br/min   Press High Alarm 60 cmH2O   Apnea Rate 10   Apnea Volume (mL) 0 mL   Apnea Oxygen Concentration  100   Apnea Flow Rate (L/min) 61   T Apnea 20 sec(s)   Ready to Wean/Extubation Screen   FIO2<=50 (chart decimal) 0.5   MV<16L (chart vol.) 9.88   PEEP <=8 (chart #) 5   Education   $ Education Ventilator Oxygen;15 min   Respiratory Evaluation   $ Care Plan Tech Time 15 min

## 2022-04-25 NOTE — PLAN OF CARE
[facility]  Initial Discharge Assessment       Primary Care Provider: Hugo Moran MD    Admission Diagnosis: Serotonin syndrome [G25.79]    Admission Date: 4/24/2022  Expected Discharge Date:     Discharge Barriers Identified: None     Assessment completed at bedside with motherMichelle [phone], patient intubated at this time.  Patient intends to discharge home, may have needs, intubated at this time.    Payor: BLUE CROSS BLUE SHIELD / Plan: [facility] [facility] [employer]PLOY[employer] BLUE CROSS [place] / Product Type: Self Funded /     Extended Emergency Contact Information  Primary Emergency Contact: Michelle Cabral  Mobile Phone: [phone]  Relation: Mother  Secondary Emergency Contact: Luis Kaplan  Address: [address] [place], [place] [address] [place] [address]  Home Phone: [phone]  Mobile Phone: [phone]  Relation: Roommate    Discharge Plan A: Home  Discharge Plan B: Home with family      [facility] - [place], [place] - [address]  [address]  [place] [address]  Phone: [phone] Fax: [phone]    [facility] - [place], [place] - [facility] [facility]  [address] [address]  Phone: [phone] Fax: [phone]    [id number] [place], [place] - [address]  [address] [facility]  [address] [address]  Phone: [phone] Fax: [phone]      Initial Assessment (most recent)     Adult Discharge Assessment - 04/25/22 1534        Discharge Assessment    Assessment Type Discharge Planning Assessment     Confirmed/corrected address, phone number and insurance Yes     Confirmed Demographics Correct on Facesheet     Source of Information family     When was your last doctors appointment? --   last week    Communicated STANISLAW with patient/caregiver Date not available/Unable to determine     Reason For Admission serotonin syndrome     Lives With parent(s)     Facility Arrived From: home     Do you  expect to return to your current living situation? Yes     Do you have help at home or someone to help you manage your care at home? Yes     Who are your caregiver(s) and their phone number(s)? Michelle Cabral [phone] (mom)     Prior to hospitilization cognitive status: Alert/Oriented     Current cognitive status: Coma/Sedated/Intubated     Walking or Climbing Stairs Difficulty none     Dressing/Bathing Difficulty none     Equipment Currently Used at Home none     Readmission within 30 days? Yes     Patient currently being followed by outpatient case management? No     Do you take prescription medications? Yes     Do you have prescription coverage? Yes     Coverage BCBS     Do you have any problems affording any of your prescribed medications? No     Is the patient taking medications as prescribed? yes     Who is going to help you get home at discharge? Michelle Cabral [phone] (mom)     How do you get to doctors appointments? car, drives self     Are you on dialysis? No     Do you take coumadin? No     Discharge Plan A Home     Discharge Plan B Home with family     DME Needed Upon Discharge  none     Discharge Plan discussed with: Parent(s)     Name(s) and Number(s) Michelle Cabral [phone] (mom)     Discharge Barriers Identified None

## 2022-04-25 NOTE — PROGRESS NOTES
VANCOMYCIN PHARMACOKINETIC NOTE:  Vancomycin Day # 1    Objective/Assessment:    Diagnosis/Indication for Vancomycin:Meningitis      37 y.o., male; Actual Body Weight = 99.3 kg (218 lb 14.7 oz).    The patient has the following labs:  4/25/2022 Estimated Creatinine Clearance: 97 mL/min (based on SCr of 1.3 mg/dL). Lab Results   Component Value Date    BUN 13 04/25/2022     Lab Results   Component Value Date    WBC 13.88 (H) 04/25/2022          Plan:  Adjust vancomycin dose and/or frequency based on the patient's actual weight and renal function:  Initiate Vancomycin 1500 mg IV every 12 hours.  Orders have been entered into patient's chart.        Vancomycin trough level has been ordered for 4/26/2022 at 15:00    Pharmacy will manage vancomycin therapy, monitor serum vancomycin levels, monitor renal function and adjust regimen as necessary.    Thank you for allowing us to participate in this patient's care.     Christina Smith 4/25/2022   Department of Pharmacy  Ext [phone]

## 2022-04-25 NOTE — CONSULTS
"[facility]  Adult Nutrition   Consult Note (Nutrition Support Management)    SUMMARY     Recommendations/Interventions:  1. When ready to start tube feedings, Start Vital HP @ 10 mL/hr and advance by 10 mL Q4hrs as tolerated to goal rate of 45 mL/hr with 30 mL FWF's Q4hrs.   · TF + FWF's @ goal to provide 1080 kcals, 95 g protein, 1083 FW/day.   · Propofol alone provides: 808 kcals. Ordered TG.   2. ProTGold BID (140 kcals, 35 g protein) to assist in meeting needs.   3. RD to monitor tube feeding tolerance, vent settings, sedation, labs, plan of care, and make recommendations accordingly.  Goals: 1. Patient to tolerate tube feedings. 2. Blood glucose and electrolytes monitored and managed.  Nutrition Goal Status: new    Dietitian Rounds Brief:  · Consult. Patient presented to ED with expressive aphasia, difficulty swallowing, and unresponsive state. Patient is intubated and sedated on propofol. Tube feeding being initiated today. Will monitor tolerance.   Reason for Assessment  Reason For Assessment: identified at risk by screening criteria  Diagnosis:  (meningitis)  Relevant Medical History: serotonin syndrome, HLD, HLD, anxiety/depression  Interdisciplinary Rounds: attended    Nutrition Risk Screen  Nutrition Risk Screen: no indicators present     MST Score: 2  Have you recently lost weight without trying?: Unsure  Weight loss score: 2  Have you been eating poorly because of a decreased appetite?: No  Appetite score: 0       Nutrition/Diet History  Food Allergies: NKFA  Factors Affecting Nutritional Intake: NPO, on mechanical ventilation    Anthropometrics  Temp: 98.6 °F (37 °C)  Height Method: Measured  Height: 6' 2" (188 cm)  Height (inches): 74 in  Weight Method: Bed Scale  Weight: 99.3 kg (218 lb 14.7 oz)  Weight (lb): 218.92 lb  Ideal Body Weight (IBW), Male: 190 lb  % Ideal Body Weight, Male (lb): 115.22 %  BMI (Calculated): 28.1  BMI Grade: 25 - 29.9 - overweight     Weight History:  Wt " Readings from Last 10 Encounters:   04/25/22 99.3 kg (218 lb 14.7 oz)   04/18/22 102.2 kg (225 lb 5 oz)   04/19/22 102.1 kg (225 lb)   03/22/22 106.2 kg (234 lb 1.6 oz)   01/14/22 106.1 kg (233 lb 12.8 oz)   06/11/21 99 kg (218 lb 4.8 oz)   03/01/21 98.4 kg (217 lb)   12/07/20 97.3 kg (214 lb 8 oz)   05/19/20 99 kg (218 lb 4.8 oz)   02/04/20 98.7 kg (217 lb 8 oz)     Lab/Procedures/Meds: Pertinent Labs Reviewed  Clinical Chemistry:  Recent Labs   Lab 04/19/22  0604 04/25/22  0343   * 138   K 3.9 4.1    103   CO2 24 24    123*   BUN 13 13   CREATININE 1.1 1.3   CALCIUM 8.3* 9.1   PROT 7.2 7.5   ALBUMIN 3.9 4.4   BILITOT 0.6 0.5   ALKPHOS 147* 132   AST 24 31   ALT 46* 25   ANIONGAP 9 11   ESTGFRAFRICA >60.0 >60.0   EGFRNONAA >60.0 >60.0   MG 2.3 2.5   PHOS 3.7  --     < > = values in this interval not displayed.     CBC:   Recent Labs   Lab 04/25/22  0343   WBC 13.88*   RBC 5.14   HGB 15.4   HCT 44.0      MCV 86   MCH 30.0   MCHC 35.0     Lipid Panel:  Recent Labs   Lab 04/19/22  0431   CHOL 180   HDL 26*   LDLCALC 111.6   TRIG 212*   CHOLHDL 14.4*     Cardiac Profile:  Recent Labs   Lab 04/18/22  1608 04/19/22  0604 04/24/22  1911 04/24/22  2216   BNP 6  --  76  --    CPK  --   --   --  592*  592*   TROPONINI <0.030 <0.030 <0.030 <0.030     Thyroid & Parathyroid:  Recent Labs   Lab 04/19/22  0431   TSH 3.270     Medications: Pertinent Medications reviewed  Scheduled Meds:   acyclovir  10 mg/kg (Ideal) Intravenous Q8H    cefTRIAXone (ROCEPHIN) IVPB  2 g Intravenous Q12H    chlorhexidine  15 mL Mouth/Throat BID    lorazepam  2 mg Intravenous Q6H    mupirocin   Nasal BID    pantoprazole  40 mg Intravenous BID    propofoL        vancomycin (VANCOCIN) IVPB  1,500 mg Intravenous Q12H     Continuous Infusions:   sodium chloride 0.9% 100 mL/hr at 04/24/22 2341    propofoL       PRN Meds:.albuterol-ipratropium, calcium chloride IVPB, calcium chloride IVPB, calcium chloride IVPB, dextrose  "10%, dextrose 10%, dextrose 50%, dextrose 50%, glucagon (human recombinant), glucose, glucose, hydrALAZINE, hydrALAZINE, lorazepam, magnesium oxide, magnesium sulfate IVPB, magnesium sulfate IVPB, magnesium sulfate IVPB, magnesium sulfate IVPB, naloxone, potassium chloride in water, potassium chloride in water, potassium chloride in water, potassium chloride in water, potassium chloride, potassium chloride, potassium chloride, potassium chloride, sodium chloride 0.9%, sodium phosphate IVPB, sodium phosphate IVPB, sodium phosphate IVPB, sodium phosphate IVPB, sodium phosphate IVPB, Pharmacy to dose Vancomycin consult **AND** vancomycin - pharmacy to dose    Antibiotics (From admission, onward)            Start     Stop Route Frequency Ordered    04/25/22 1600  vancomycin (VANCOCIN) 1,500 mg in dextrose 5 % 500 mL IVPB         -- IV Every 12 hours (non-standard times) 04/25/22 0510    04/25/22 0945  mupirocin 2 % ointment  (MRSA Decolonization Orders ST)         04/30 0859 Nasl 2 times daily 04/25/22 0844    04/25/22 0900  cefTRIAXone (ROCEPHIN) 2 g/50 mL D5W IVPB         -- IV Every 12 hours (non-standard times) 04/25/22 0737    04/24/22 2300  vancomycin - pharmacy to dose  (vancomycin IVPB)        "And" Linked Group Details    -- IV pharmacy to manage frequency 04/24/22 2203        Estimated/Assessed Needs  Weight Used For Calorie Calculations: 99.3 kg (218 lb 14.7 oz)  Energy Calorie Requirements (kcal): 1986-2483 kcals/day (20-25 kcals/kg)  Energy Need Method: Kcal/kg  Protein Requirements: 119-150 g/day (1.2-1.5 g/kg)  Weight Used For Protein Calculations: 99.3 kg (218 lb 14.7 oz)  Fluid Requirements (mL): 1 mL/kcal or per MD  RDA Method (mL): 1986    Nutrition Prescription Ordered    Current Diet Order: NPO    Evaluation of Received Nutrient/Fluid Intake    Other Calories (kcal): 808 (Propofol infusing @ 30.6)  IV Fluid (mL): 2400 (0.9% NaCl @ 100 mL/hr)  Energy Calories Required: not meeting needs  Protein " Required: not meeting needs  Fluid Required: meeting needs  Tolerance:  (NPO)  % Intake of Estimated Energy Needs: 25 - 50 %  % Meal Intake: NPO    Intake/Output Summary (Last 24 hours) at 4/25/2022 1004  Last data filed at 4/25/2022 0700  Gross per 24 hour   Intake 919.75 ml   Output 1150 ml   Net -230.25 ml      Nutrition Risk    Level of Risk/Frequency of Follow-up: high   Monitor and Evaluation    Food and Nutrient Intake: energy intake, enteral nutrition intake  Food and Nutrient Adminstration: enteral and parenteral nutrition administration  Physical Activity and Function: nutrition-related ADLs and IADLs, factors affecting access to physical activity  Anthropometric Measurements: weight, weight change, body mass index  Biochemical Data, Medical Tests and Procedures: gastrointestinal profile, electrolyte and renal panel, glucose/endocrine profile, inflammatory profile, lipid profile  Nutrition-Focused Physical Findings: overall appearance     Nutrition Follow-Up    RD Follow-up?: Yes  Mary Jane Seymour RD 04/25/2022 10:07 AM

## 2022-04-25 NOTE — CONSULTS
Pulmonary/Critical Care Consult      PATIENT NAME: Iván Amaral  MRN: [id number]  TODAY'S DATE: 2022  9:20 AM  ADMIT DATE: 2022  AGE: 37 y.o. : 1985    CONSULT REQUESTED BY: Timoteo Fritz MD    REASON FOR CONSULT:   Critical care management    HPI:  Patient is a 37-year-old male who was admitted a week ago with TIA symptoms.  Was discharged home when they resolved.  He began feeling worse and called his mother who is an ER nurse who got him to the hospital.  In the hospital he was agitated and combative in intubated.  An LP was done which reveals 225 wbc's 88% are lymphs 2% polys and 10% monos.  The total protein was 186 and the glucose was 80.    REVIEW OF SYSTEMS  unobtainable    ALLERGIES  Review of patient's allergies indicates:   Allergen Reactions    Pcn [penicillins]        INPATIENT SCHEDULED MEDICATIONS   acyclovir  10 mg/kg (Ideal) Intravenous Q8H    cefTRIAXone (ROCEPHIN) IVPB  2 g Intravenous Q12H    chlorhexidine  15 mL Mouth/Throat BID    lorazepam  2 mg Intravenous Q6H    mupirocin   Nasal BID    propofoL        vancomycin (VANCOCIN) IVPB  1,500 mg Intravenous Q12H      sodium chloride 0.9% 100 mL/hr at 22 2341    propofoL 30 mcg/kg/min (22 0822)       MEDICAL AND SURGICAL HISTORY  Past Medical History:   Diagnosis Date    Acute gout of left foot 2020    Gout 2019    Hyperlipidemia     Stomach ulcer      Past Surgical History:   Procedure Laterality Date    NASAL SEPTUM SURGERY         ALCOHOL, TOBACCO AND DRUG USE  Social History     Tobacco Use   Smoking Status Never Smoker   Smokeless Tobacco Never Used     Social History     Substance and Sexual Activity   Alcohol Use No     Social History     Substance and Sexual Activity   Drug Use No       FAMILY HISTORY  Family History   Problem Relation Age of Onset    Diabetes Mother     Hypothyroidism Mother     Diabetes Father     Gout Father     Heart failure Father     Heart disease Father      Hypoglycemic Brother     Cancer Maternal Grandfather        VITAL SIGNS (MOST RECENT)  Temp: 98.6 °F (37 °C) (04/25/22 0701)  Pulse: 87 (04/25/22 0800)  Resp: 20 (04/25/22 0800)  BP: 126/72 (04/25/22 0800)  SpO2: 98 % (04/25/22 0800)    INTAKE AND OUTPUT (LAST 24 HOURS):    Intake/Output Summary (Last 24 hours) at 4/25/2022 0920  Last data filed at 4/25/2022 0700  Gross per 24 hour   Intake 919.75 ml   Output 1150 ml   Net -230.25 ml       WEIGHT  Wt Readings from Last 1 Encounters:   04/25/22 99.3 kg (218 lb 14.7 oz)       PHYSICAL EXAM  GENERAL: Older patient in no distress.  HEENT: Pupils equal and reactive. Extraocular movements intact. Nose intact. Pharynx moist.  NECK: Supple.   HEART: Regular rate and rhythm. No murmur or gallop auscultated.  LUNGS: Clear to auscultation and percussion. Lung excursion symmetrical. No change in fremitus. No adventitial noises.  ABDOMEN: Bowel sounds present. Non-tender, no masses palpated.  : Normal anatomy.  EXTREMITIES: Normal muscle tone and joint movement, no cyanosis or clubbing.   LYMPHATICS: No adenopathy palpated, no edema.  SKIN: Dry, intact, no lesions.   NEURO:  Sedated but when stimulated tries to sit up and moves all extremities violently.  When sleeping he tends to roll his arms inward but I do not think this is true posturing  PSYCH:  Sedated      CBC LAST (LAST 24 HOURS)  Recent Labs   Lab 04/25/22  0343   WBC 13.88*   RBC 5.14   HGB 15.4   HCT 44.0   MCV 86   MCH 30.0   MCHC 35.0   RDW 12.6      MPV 9.5   GRAN 78.5*  10.9*   LYMPH 11.7*  1.6   MONO 8.5  1.2*   BASO 0.06   NRBC 0       CHEMISTRY LAST (LAST 24 HOURS)  Recent Labs   Lab 04/25/22  0045 04/25/22  0343   NA  --  138   K  --  4.1   CL  --  103   CO2  --  24   ANIONGAP  --  11   BUN  --  13   CREATININE  --  1.3   GLU  --  123*   CALCIUM  --  9.1   PH 7.385  --    MG  --  2.5   ALBUMIN  --  4.4   PROT  --  7.5   ALKPHOS  --  132   ALT  --  25   AST  --  31   BILITOT  --  0.5        COAGULATION LAST (LAST 24 HOURS)  Recent Labs   Lab 04/25/22  0343   LABPT 13.6   INR 1.1   APTT 25.2       CARDIAC PROFILE (LAST 24 HOURS)  Recent Labs   Lab 04/24/22  1911 04/24/22 2216   BNP 76  --    CPK  --  592*  592*   TROPONINI <0.030 <0.030       LAST 7 DAYS MICROBIOLOGY   Microbiology Results (last 7 days)     Procedure Component Value Units Date/Time    Fungus culture [[id number]] Collected: 04/24/22 2200    Order Status: No result Specimen: CSF (Spinal Fluid) Updated: 04/25/22 0814    CSF culture and Gram Stain (Tube 2) [[id number]] Collected: 04/24/22 2200    Order Status: Completed Specimen: CSF (Spinal Fluid) from CSF Tap, Tube 2 Updated: 04/25/22 0807     CSF CULTURE No Growth to date     Gram Stain Result Few WBC's      No organisms seen    Narrative:      On which sequentially labeled tube should this analysis be  performed?->2    Gladys Ink (CSF) [[id number]] Collected: 04/24/22 2200    Order Status: Completed Specimen: CSF (Spinal Fluid) Updated: 04/25/22 0805     Gladys Ink No encapsulated yeast seen    Culture, Respiratory with Gram Stain [[id number]] Collected: 04/25/22 0000    Order Status: Completed Specimen: Respiratory from Endotracheal Aspirate Updated: 04/25/22 0751     Respiratory Culture No Normal respiratory gunjan     Gram Stain (Respiratory) <10 epithelial cells per low power field.     Gram Stain (Respiratory) Many WBC's     Gram Stain (Respiratory) Many Gram positive cocci     Gram Stain (Respiratory) Few Gram negative rods    Fungus culture [[id number]]     Order Status: No result Specimen: CSF (Spinal Fluid) from CSF Tap, Tube 1     Cryptococcal antigen [[id number]]     Order Status: Completed Specimen: Blood, Venous     Gladys Ink (CSF) [[id number]]     Order Status: Completed Specimen: CSF (Spinal Fluid) from CSF Tap, Tube 1     MRSA Screen by PCR [[id number]] Collected: 04/25/22 0347    Order Status: Completed Specimen: Nasopharyngeal Swab from Nasal Updated: 04/25/22 0559      MRSA SCREEN BY PCR Negative    Group A Strep, Molecular [[id number]] Collected: 04/25/22 0347    Order Status: Completed Specimen: Throat Updated: 04/25/22 0459     Group A Strep, Molecular Negative     Comment: Arcanobacterium haemolyticum and Beta Streptococcus group C   and G will not be detected by this test method.  Please order   Throat Culture ([id number]) if suspected.         Respiratory Infection Panel (PCR), Nasopharyngeal [[id number]] Collected: 04/25/22 0347    Order Status: Completed Updated: 04/25/22 0458     Respiratory Infection Panel Source NP swab    Narrative:      Specimen Source->Nasopharyngeal Swab    Resp Viral Panel PCR, Peds Under 7 Months Nasopharyngeal Swab [[id number]] Collected: 04/25/22 0347    Order Status: Canceled     Blood culture [[id number]] Collected: 04/24/22 1920    Order Status: Completed Specimen: Blood from Peripheral, Antecubital, Right Updated: 04/25/22 0317     Blood Culture, Routine No Growth to date    Blood culture [[id number]] Collected: 04/24/22 1925    Order Status: Completed Specimen: Blood from Peripheral, Antecubital, Right Updated: 04/25/22 0317     Blood Culture, Routine No Growth to date    Urine Culture High Risk [[id number]] Collected: 04/25/22 0143    Order Status: Sent Specimen: Urine, Catheterized Updated: 04/25/22 0209          MOST RECENT IMAGING  CT Head Without Contrast  Head CT scan without contrast    COMPARISONS: CTA head and neck dated April 18, 2022    ADDITIONAL PERTINENT HISTORY: New onset seizure    TECHNIQUE:  Multiple axial images were obtained from the skull base to the vertex without IV contrast. One of the following dose optimization techniques was utilized in the performance of this exam: Automated exposure control; adjustment of the mA and/or kV according to the patient's size; or use of an iterative  reconstruction technique.  Specific details can be referenced in the facility's radiology CT exam operational policy.    FINDINGS:    Midline  shift: Negative    Ventricles:  Negative    Brain parenchyma:  Negative    Extra-axial spaces:  Negative    Intracranial vasculature:  Negative    Osseous structures:  Negative    Paranasal sinuses and mastoid air cells:  'S marked mucosal thickening involving both maxillary sinuses as well as the sphenoid sinuses and ethmoid air cells.    Surrounding soft tissues and orbits:  Negative    IMPRESSION:  1. No acute intracranial pathology.  2. Underlying paranasal sinus disease.    Electronically signed by:  Tacho Shultz MD  4/24/2022 8:15 PM CDT Workstation: [id number]  X-Ray Chest AP Portable  HISTORY: Chest Pain    FINDINGS: Portable chest radiograph at 1921 hours compared to 04/18/2020 shows interval insertion of a nasogastric tube, with the distal tip overlying the proximal gastric body, and the proximal port near the expected level of the gastroesophageal junction. The cardiomediastinal silhouette and pulmonary vasculature are stable and within normal limits.    The lungs are hypoexpanded, with parahilar bandlike and irregular opacities suggesting atelectasis. No consolidation, large pleural effusion, evidence of pulmonary edema, or pneumothorax. No acute fractures or destructive osseous lesions.    IMPRESSION:  1. Nasogastric tube as described.  2. Pulmonary hypoinflation, with perihilar opacities suggesting atelectasis.    Electronically signed by:  Seamus Mercado MD  4/24/2022 7:46 PM CDT Workstation: [id number]      CURRENT VISIT EKG  Results for orders placed or performed during the hospital encounter of 04/24/22   EKG 12-lead    Narrative    Test Reason : R07.9,    Vent. Rate : 124 BPM     Atrial Rate : 124 BPM     P-R Int : 150 ms          QRS Dur : 102 ms      QT Int : 324 ms       P-R-T Axes : 034 024 073 degrees     QTc Int : 465 ms    Sinus tachycardia  Otherwise normal ECG  When compared with ECG of 18-APR-2022 16:07,  Vent. rate has increased BY  45 BPM    Referred By: [email or web address]   SELF            Confirmed By:        ECHOCARDIOGRAM RESULTS  Results for orders placed during the hospital encounter of 04/18/22    Echo Saline Bubble? Yes    Interpretation Summary  · The left ventricle is normal in size with concentric remodeling and normal systolic function.  · The estimated ejection fraction is 65%.  · Normal left ventricular diastolic function.  · There is no evidence of intracardiac shunting. Bubble study negative for PFO  · Atrial fibrillation not observed.  · Normal right ventricular size with normal right ventricular systolic function.  · Normal central venous pressure (3 mmHg).  · The estimated PA systolic pressure is 11 mmHg.        VENTILATOR INFORMATION  Vent Mode: A/C  Oxygen Concentration (%):  [24-80] 24  Resp Rate Total:  [20 br/min-30 br/min] 20 br/min  Vt Set:  [500 mL] 500 mL  PEEP/CPAP:  [5 cmH20] 5 cmH20  Mean Airway Pressure:  [4.7 cmH20-9.7 cmH20] 9.1 cmH20       LAST ARTERIAL BLOOD GAS  ABG  Recent Labs   Lab 04/25/22  0045   PH 7.385   PO2 114*   PCO2 43.0   HCO3 25.8   BE 1       IMPRESSION AND PLAN  Meningoencephalitis  Intubated and ventilated for his agitated behavior  Agitated again this morning when sedation was decreased  GI bleeding  Hemoptysis whenever suctioned  Leukocytosis, improving  Hyperglycemia, improved  Hypernatremia, corrected    Continue antibiotics and antivirals  Continue sedation, mechanical ventilation  Consult GI for GI bleeding  Protonix b.i.d.  Chest x-ray to see where his endotracheal tube is, it will undoubtedly need to be advanced  Change out HME as there is blood in the tubing      Critical care time spent reviewing the chart, examining the patient, reviewing the labs, reviewing the radiological findings, discussing care with nursing, physicians, and respiratory and creating the note and  has been greater than 35 minutes  Kathy Jeffery MD  [facility]  Department of Pulmonology  Date of Service: 04/25/2022  9:20 AM

## 2022-04-25 NOTE — CONSULTS
[facility]  Neurology Consult    Patient Name: Iván Amaral  MRN: [id number]  : 1985  TODAY'S DATE: 2022  ADMIT DATE: 2022  5:57 PM                                          CONSULTED PROVIDER: Flor Navarro MD, Neurologist. Cellphone: [phone]  CONSULT REQUESTED BY: Dr. Burton    Chief Complaint   Patient presents with    Seizures       HPI per EMR:  Iván Amaral is a 37 y.o. White male   With PMH of gastritis, HLD, gout,  who presents with AMS.     Pt recently discharged from [facility] on 22,  He was evaluated for headaches with R paraesthesia   There was initially concern for SAH on CT head  However MRI Brain was without SAH  Pt was evaluated by neurology during inpt stay  Pt was treated as TIA  All neurological deficits had resolved by discharge  EEG was to be done outpt basis     Pt is currently intubated on MV  Pt's mother at bedside supplements history  Pt had a reoccurrence of headache with paraesthesias on Wed  He called nursing line, was encouraged to follow-up with his PCP  The symptoms self resolved     Pt continued to feel unwell but could not pinpoint why  He was afraid and asked his mother to monitor him  She is an ER nurse in [place] and came to stay with him  She reports episodes of diaphoresis and pallor  Pt would not let her take his temperature  VS were otherwise stable and pt remained at mental status baseline  Pt's mother also notes a lingering dry cough  He had an URI a few weeks ago  Pt's roommate recently had COVID     Pt eventually sent his mother home--she reports she didn't get around the corner  He called her almost immediately with slurred speech  When she returned to him, he was on the couch unable to move   He was also experiencing a feeling of impending doom  They returned to ER with EMS  EMS reports a staring episode with L gaze that lasted approx 45 seconds  They report he was anxious, excitable, diaphoretic  Began to  experience difficulty swallowing + aphasia     Pt became increasingly agitated and combative in the ER  HR 150s  BP 220s/110s  Pt was sedated, and LP done in ER  Pt currently intubated on MV  Beforehand he told ER physician he took 2 prozac  His mother reports she isn't sure if this is true  Pt no longer takes prozac, he takes welbutrin  Pt is also usually very careful with his medication  He always asks her before mixing any medication  He is usually laid back and without anxiety as well  It is unusual for him to be anxious or agitated     Per pt's mother, pt lives in a filthy house with 3 other men  With cats  There is mold and trash everywhere  She reports they are hoarders    Neurology Consult:  Patient was seen and examined by me today.  He is a 37-year-old gentleman with no significant past medical history who presented initially on 04/19/2022 for right sided numbness, with negative MRI brain for bleed or stroke, treated as a TIA and then discharged.  Then again he presented with altered mental status, and possibly has seizure-like event prior to presentation to the emergency room.  He was agitated, combative, hypertensive and tachycardic requiring intubation and sedation.  LP was done in the emergency room with evidence of possible meningitis/encephalitis.  Neurology consulted for assistance with management.      Review of Systems:    Unable to obtain in intubated patient.    Past Medical History:  has a past medical history of Acute gout of left foot (12/7/2020), Gout (2019), Hyperlipidemia, and Stomach ulcer.    Past Surgical History:  has a past surgical history that includes Nasal septum surgery.    Family Medical History: family history includes Cancer in his maternal grandfather; Diabetes in his father and mother; Gout in his father; Heart disease in his father; Heart failure in his father; Hypoglycemic in his brother; Hypothyroidism in his mother.    Social History:  reports that he has never smoked.  He has never used smokeless tobacco. He reports that he does not drink alcohol and does not use drugs.    PCP: Hugo Moran MD      Allergies:   Review of patient's allergies indicates:   Allergen Reactions    Pcn [penicillins]        Medications:   No current facility-administered medications on file prior to encounter.     Current Outpatient Medications on File Prior to Encounter   Medication Sig Dispense Refill    allopurinoL (ZYLOPRIM) 300 MG tablet Take 1 tablet (300 mg total) by mouth once daily. 90 tablet 3    aspirin (ECOTRIN) 81 MG EC tablet Take 1 tablet (81 mg total) by mouth once daily. 90 tablet 1    atorvastatin (LIPITOR) 40 MG tablet Take 1 tablet (40 mg total) by mouth once daily. 90 tablet 1    benzonatate (TESSALON) 100 MG capsule Take 100 mg by mouth 3 (three) times daily as needed for Cough.      buPROPion (WELLBUTRIN SR) 150 MG TBSR 12 hr tablet Take 1 tablet (150 mg total) by mouth 2 (two) times daily. 60 tablet 11    fluticasone propionate (FLONASE) 50 mcg/actuation nasal spray 1 spray (50 mcg total) by Each Nostril route once daily. 16 g 3    multivitamin (THERAGRAN) per tablet Take 1 tablet by mouth once daily.      [DISCONTINUED] pravastatin (PRAVACHOL) 10 MG tablet Take 10 mg by mouth once daily.         Physical Exam  Current Vitals:  Vitals:    04/25/22 0800   BP: 126/72   Pulse: 87   Resp: 20   Temp:      General appearance:  Sedated and intubated  Cardiovascular:  Regular rate and rhythm  Pulmonary:  Mechanical ventilation breath sounds  GI:  OG tube in place  MSK:  No lesions  Skin:  No lesions    NEUROLOGICAL EXAM:    Mental status:  Under sedation          Intubated:  Yes          Sedated:  Yes  Response to noxious stimulation:  No  Breathes above ventilator:  Yes  Opens eyes:  Now  Pupils: symmetric          Left: reactive          Right: reactive  Eye movements: No pathologic movements such as nystagmus, ocular bobbing, dipping or roving.  Corneal reflex:   Present  Oculocephalic reflex:  Present  Cough:  Present  Gag:  Present  Motor exam: Normal muscle tone. Normal muscle bulk. No abnormal movements such as tremors, myoclonus or twitching. No response to pain.  DTRs: 1+ throughout. Babinski response absent.      Laboratory Data & Studies    Recent Labs   Lab 04/19/22  0604 04/24/22 1911 04/25/22  0343   WBC 5.47 17.27* 13.88*   HGB 14.9 16.7 15.4    622* 373   MCV 83 85 86       Recent Labs   Lab 04/18/22  1445 04/19/22  0604 04/24/22 1911 04/25/22  0009 04/25/22  0343    134* 147* 139 138   K 3.9 3.9 4.6 3.8 4.1    101 106 102 103   CO2 22* 24 21* 23 24   BUN 15 13 13 15 13    103 213* 128* 123*   CALCIUM 9.6 8.3* 10.0 9.3 9.1   MG 2.3 2.3  --   --  2.5   PHOS  --  3.7  --   --   --        Recent Labs   Lab 04/19/22  0604 04/24/22 1911 04/25/22  0343   PROT 7.2 8.9* 7.5   ALBUMIN 3.9 5.0 4.4   BILITOT 0.6 0.6 0.5   AST 24 29 31   ALT 46* 27 25   ALKPHOS 147* 158* 132       Recent Labs   Lab 04/18/22  1608 04/19/22  0604 04/25/22  0343   LABPT 12.7 13.3 13.6   INR 1.0 1.1 1.1   APTT  --  28.5 25.2       Recent Labs   Lab 04/18/22  1608 04/19/22  0431   CHOL 189 180   TRIG 280* 212*   LDLCALC 106.0 111.6   HDL 27* 26*   TSH 4.410 3.270       Imaging:  CT Head Without Contrast    Result Date: 4/24/2022  Head CT scan without contrast COMPARISONS: CTA head and neck dated April 18, 2022 ADDITIONAL PERTINENT HISTORY: New onset seizure TECHNIQUE:  Multiple axial images were obtained from the skull base to the vertex without IV contrast. One of the following dose optimization techniques was utilized in the performance of this exam: Automated exposure control; adjustment of the mA and/or kV according to the patient's size; or use of an iterative  reconstruction technique.  Specific details can be referenced in the facility's radiology CT exam operational policy. FINDINGS: Midline shift: Negative Ventricles:  Negative Brain parenchyma:  Negative  Extra-axial spaces:  Negative Intracranial vasculature:  Negative Osseous structures:  Negative Paranasal sinuses and mastoid air cells:  'S marked mucosal thickening involving both maxillary sinuses as well as the sphenoid sinuses and ethmoid air cells. Surrounding soft tissues and orbits:  Negative IMPRESSION: 1. No acute intracranial pathology. 2. Underlying paranasal sinus disease. Electronically signed by:  Tacho Shultz MD  4/24/2022 8:15 PM CDT Workstation: [id number]    CT Head Without Contrast    Result Date: 4/18/2022  CMS MANDATED QUALITY DATA - CT RADIATION 436 All CT scans at this facility utilize dose modulation, iterative reconstruction, and/or weight based dosing when appropriate to reduce radiation dose to as low as reasonably achievable. CLINICAL HISTORY: 37 years (1985) Male Right-sided numbness TECHNIQUE: CT HEAD WITHOUT IV CONTRAST. 121 images obtained. Axial CT of the brain without contrast using soft tissue and bone algorithm. Please note in the acute setting if there is a clinical concern for an acute stroke MRI would be more sensitive/specific for evaluation of ischemia. COMPARISON: None available. FINDINGS: Small linear hyperattenuating blush within a sulcus of the left temporal lobe (image 29), possibly an irregular vessel but suspicious for a tiny subarachnoid bleed. There is no hydrocephalus, herniation or midline shift and the basal/suprasellar cisterns are patent. No acute osseous abnormality is identified. The ventricles and sulci are normal. There is normal gray white differentiation.  Orbital contents appear within normal limits. There is soft tissue attenuation within the left external auditory canal most consistent with cerumen (consider correlation with direct visualization). The visualized paranasal sinuses show mucosal thickening in the right greater than left maxillary sinus, mid ethmoid air cells, anterior sphenoid and frontal ethmoid recesses. There is heterogeneous  foamy debris inferiorly on the right. The visualized mastoid air cells are clear. IMPRESSION: 1. Findings suspicious for a tiny subarachnoid bleed in a sulcus of the left temporal lobe. Consider correlation with either short-term follow-up or a contrast-enhanced MRI of the brain. 2. No hydrocephalus, herniation or midline shift. 3. Mucoperiosteal paranasal sinus disease most pronounced in the right maxillary sinus, consider correlation for acute sinusitis. RESULT NOTIFICATION: These observations were discussed by the dictating physician, by phone and acknowledged by the ordering provider BRI RODRIGUEZ JR at 4/18/2022 3:50 PM CDT . Electronically signed by:  Iván Anthony MD  4/18/2022 3:54 PM CDT Workstation: [id number]    MRI Brain W WO Contrast    Result Date: 4/18/2022  MRI of the brain with and without contrast Clinical history is numbness, TIA questionable small subarachnoid hemorrhage seen on CT brain 1534 hours 9.5 mL Gadavist IV contrast The ventricles and sulci are normal in size and configuration for age. There is no hemorrhage, mass or midline shift. There are no extra-axial fluid collections. There is no evidence of subarachnoid hemorrhage in the left temporal region. There is normal signal within the white matter on all sequences. There is no abnormality on the diffusion-weighted images to suggest acute infarct. . The cerebellum and brainstem are normal. There is no pathologic enhancement. There are flow voids within the cavernous portions of the internal carotid arteries and basilar artery indicating patency. The corpus callosum, cerebellar tonsils, midline structures and orbits are normal. The paranasal sinuses and mastoid air cells are clear. IMPRESSION: Negative MRI of the brain No evidence of subarachnoid hemorrhage within the left temporal region No pathologic enhancement. Electronically signed by:  Clemencia Reyes MD  4/18/2022 6:15 PM CDT Workstation: [id number]    X-Ray Chest AP  Portable    Result Date: 4/24/2022  HISTORY: Chest Pain FINDINGS: Portable chest radiograph at 1921 hours compared to 04/18/2020 shows interval insertion of a nasogastric tube, with the distal tip overlying the proximal gastric body, and the proximal port near the expected level of the gastroesophageal junction. The cardiomediastinal silhouette and pulmonary vasculature are stable and within normal limits. The lungs are hypoexpanded, with parahilar bandlike and irregular opacities suggesting atelectasis. No consolidation, large pleural effusion, evidence of pulmonary edema, or pneumothorax. No acute fractures or destructive osseous lesions. IMPRESSION: 1. Nasogastric tube as described. 2. Pulmonary hypoinflation, with perihilar opacities suggesting atelectasis. Electronically signed by:  Seamus Mercado MD  4/24/2022 7:46 PM CDT Workstation: [id number]    X-Ray Chest AP Portable    Result Date: 4/18/2022  XR CHEST 1 VIEW CLINICAL HISTORY: 37 years Male TIA COMPARISON: None FINDINGS: Cardiomediastinal silhouette is within normal limits. Lungs are normally expanded with no airspace consolidation. No pleural effusion or pneumothorax. No acute osseous abnormality. IMPRESSION: No acute pulmonary process. Electronically signed by:  Clemencia Reyes MD  4/18/2022 3:08 PM CDT Workstation: [id number]    Echo Saline Bubble? Yes    Result Date: 4/19/2022  · The left ventricle is normal in size with concentric remodeling and normal systolic function. · The estimated ejection fraction is 65%. · Normal left ventricular diastolic function. · There is no evidence of intracardiac shunting. Bubble study negative for PFO · Atrial fibrillation not observed. · Normal right ventricular size with normal right ventricular systolic function. · Normal central venous pressure (3 mmHg). · The estimated PA systolic pressure is 11 mmHg.      CTA Head and Neck (xpd)    Result Date: 4/18/2022  EXAMINATION: CTA HEAD AND NECK (XPD) CLINICAL  INDICATION: Male, 37 years old. TIA, right-sided numbness TECHNIQUE: Axial CT angiogram of the head and neck was performed with contrast. Multiplanar reformations as well as 3-D volume rendered imaging were reviewed at the workstation. Degree of stenosis was measured utilizing the NASCET method. CONTRAST: 100 mL Omnipaque 350 mL of IV. COMPARISON: None FINDINGS: CTA brain: The distal vertebral arteries, basilar artery and cavernous portion of the internal carotid arteries are patent. The anterior cerebral arteries, middle cerebral arteries and posterior cerebral arteries are widely patent without evidence of large vessel occlusion, stenosis, AVM or aneurysm. Aortic Arch and Great Vessel Origins: Three-vessel aortic arch. The visualized portion of the great vessels are widely patent. Subclavian Arteries: Widely patent. Right Common Carotid Artery: Widely patent. Right Internal Carotid Artery: Widely patent. Right External Carotid Artery: Widely patent. Left Common Carotid Artery: Widely patent. Left Internal Carotid Artery: Widely patent. Left External Carotid Artery: Widely patent. Right Vertebral Artery: Widely patent. Left Vertebral Artery: Widely patent. Dural Venous Sinuses: No evidence of thrombosis or occlusion. There is mucosal thickening in the right maxillary sinus. Neck Soft Tissues: Paraspinous soft tissues are normal. There are no acute osseous abnormalities. The visualized portion of the lung apices are clear. IMPRESSION: Negative CTA of the head and neck. This exam was performed according to our departmental dose-optimization program which includes automated exposure control, adjustment of the mA and/or kV according to patient size and/or use of iterative reconstruction technique. Electronically signed by:  Clemencia Reyes MD  4/18/2022 4:03 PM CDT Workstation: [id number]        Assessment and Plan:    Viral meningitis: suspect HSV  Altered Mental Status    Mr. Amaral is a 37-year-old gentleman with  no significant past medical history who presented with altered mental status and seizure-like event. He was agitated, combative, hypertensive and tachycardic requiring intubation and sedation.  LP was done in the emergency room and showed increased white blood cells in CSF with lymphocytic predominance as well as increase red blood cells consistent with viral meningitis.  Most likely HSV.  Neurology consulted for assistance with management.  - admitted to the ICU, on mechanical ventilation and sedation for airway protection, telemetry monitoring and q.1 hour neuro checks  - and P was performed, consistent with viral meningitis:  Elevated white blood count with lymphocytic predominance, moderately elevated protein  - HSV 1 and 2 PCR was ordered in the CSF  - also ordered arboviral panel including WNV  - CSF cultures as well  - wean off sedation and ventilation as tolerated  - continue empiric antibiotic and antiviral treatment with ceftriaxone, vancomycin and acyclovir until results of cultures and serologies come back  - ordered EEG to rule out seizure activity  - will follow-up results of above tests and provide further recommendations      · DVT prophylaxis with chemo/SCD prophylaxis    [facility] at [phone] within 3 days from discharge.     Stroke education was provided including stroke risk factors modification and any acute neurological changes including weakness, confusion, visual changes to come straight to the ER.     All questions were answered.                              Thank you kindly for including us in the care of this patient. Please do not hesitate to contact us with any questions.       Critical Care:  65 minutes of critical care time has been spent evaluating with the patient. Time includes chart review not limited to diagnostic imaging, labs, and vitals, patient assessment, discussion with family and nursing, current order evaluations, and new order  entries.      Flor Navarro MD  Neurology  Cellphone: [phone]

## 2022-04-25 NOTE — ED PROVIDER NOTES
Encounter Date: 4/24/2022       History     Chief Complaint   Patient presents with    Seizures     37-year-old male with history of gastritis, hyperlipidemia, gout.  Patient recently seen emergency department evaluated for possible subarachnoid hemorrhage versus complex partial seizure.  Patient underwent extensive workup including CT head noncontrast study, CTA head and neck as well as MRI brain noncontrast.  Essentially workup proved to be nondiagnostic.  Patient scheduled to follow-up with neurology.  Patient presents emergency department today with anxious hyperexcitable type state.  Per family and EMS patient appear to be hyperventilating and was found to be diaphoretic while at this home.  Patient states that he did take 2 Prozac pills however denies taking any additional pills.  Upon arrival to the emergency department patient extremely hyper excitable with heart rate 150 and blood pressure 220/110.  Patient at that time was stating that he needed help over and over again and he was feeling extremely anxious.  Patient was profusely diaphoretic.        Review of patient's allergies indicates:   Allergen Reactions    Pcn [penicillins]      Past Medical History:   Diagnosis Date    Gout 2019    Hyperlipidemia     Stomach ulcer      Past Surgical History:   Procedure Laterality Date    NASAL SEPTUM SURGERY       Family History   Problem Relation Age of Onset    Diabetes Mother     Hypothyroidism Mother     Diabetes Father     Gout Father     Heart failure Father     Heart disease Father     Hypoglycemic Brother     Cancer Maternal Grandfather      Social History     Tobacco Use    Smoking status: Never Smoker    Smokeless tobacco: Never Used   Substance Use Topics    Alcohol use: No    Drug use: No     Review of Systems   Constitutional: Positive for diaphoresis. Negative for fever.   HENT: Negative for sore throat.    Respiratory: Negative for shortness of breath.    Cardiovascular: Negative  for chest pain.   Gastrointestinal: Negative for nausea.   Genitourinary: Negative for dysuria.   Musculoskeletal: Negative for back pain.   Skin: Negative for rash.   Neurological: Positive for seizures. Negative for weakness.   Hematological: Does not bruise/bleed easily.   Psychiatric/Behavioral: Positive for agitation. Negative for suicidal ideas. The patient is nervous/anxious and is hyperactive.        Physical Exam     Initial Vitals   BP Pulse Resp Temp SpO2   04/24/22 1930 04/24/22 1930 04/24/22 1930 04/24/22 1944 04/24/22 1930   135/62 (!) 122 (!) 25 97.5 °F (36.4 °C) 96 %      MAP       --                Physical Exam    Nursing note and vitals reviewed.  Constitutional: He is diaphoretic. He appears distressed.   HENT:   Head: Normocephalic and atraumatic.   Nose: Nose normal.   Mouth/Throat: Oropharynx is clear and moist.   Eyes: Conjunctivae and EOM are normal. Pupils are equal, round, and reactive to light. No scleral icterus.   Neck: Neck supple.   Normal range of motion.  Cardiovascular: Normal rate, regular rhythm, normal heart sounds and intact distal pulses. Exam reveals no gallop and no friction rub.    No murmur heard.  Pulmonary/Chest: No stridor. No respiratory distress.   Course bilateral breath sounds no adventitious sounds   Abdominal: Abdomen is soft. Bowel sounds are normal. He exhibits no mass. There is no abdominal tenderness. There is no rebound and no guarding.   Musculoskeletal:         General: No tenderness or edema. Normal range of motion.      Cervical back: Normal range of motion and neck supple.     Lymphadenopathy:     He has no cervical adenopathy.   Neurological: He is alert. He has normal strength and normal reflexes. He displays normal reflexes. No cranial nerve deficit or sensory deficit. GCS score is 15. GCS eye subscore is 4. GCS verbal subscore is 5. GCS motor subscore is 6.   Skin: Skin is warm. Capillary refill takes less than 2 seconds. No rash noted.    Psychiatric:   Hyperexcitable, patient screaming combative to staff with poor insight and judgment         ED Course   Intubation    Date/Time: 4/24/2022 7:33 PM  Location procedure was performed: [facility] EMERGENCY DEPARTMENT  Performed by: Luis Back MD  Authorized by: Luis Back MD   Consent Done: Emergent Situation  Indications: airway protection (Acute delirium)  Patient status: paralyzed (RSI)  Preoxygenation: nonrebreather mask and bag valve mask  Sedatives: etomidate  Paralytic: succinylcholine  Laryngoscope size: Glide 4  Tube size: 7.5 mm  Tube type: cuffed  Number of attempts: 1  Cricoid pressure: no  Cords visualized: yes  Post-procedure assessment: chest rise,  ETCO2 monitor and CO2 detector  Breath sounds: clear  Cuff inflated: yes  ETT to lip: 23 cm  Tube secured with: ETT farmer  Chest x-ray interpreted by me.  Chest x-ray findings: endotracheal tube in appropriate position  Patient tolerance: Patient tolerated the procedure well with no immediate complications  Complications: No  Specimens: No  Implants: No    Lumbar Puncture    Date/Time: 4/24/2022 10:10 PM  Location procedure was performed: [facility] EMERGENCY DEPARTMENT  Performed by: Luis Back MD  Authorized by: Luis Back MD   Consent Done: Emergent Situation  Indications: evaluation for altered mental status  Anesthesia: see MAR for details    Patient sedated: yes  Sedatives: etomidate and propofol  Analgesia: see MAR for details  Vitals: Vital signs were monitored during sedation.  Preparation: Patient was prepped and draped in the usual sterile fashion.  Lumbar space: L3-L4 interspace  Patient's position: left lateral decubitus  Needle gauge: 20  Needle type: danelle point  Needle length: 3.5 in  Number of attempts: 3  Fluid appearance: xanthochromic  Tubes of fluid: 4  Total volume: 16 ml  Post-procedure: site cleaned and adhesive bandage applied  Complications: No  Specimens: No  Implants: No  Patient tolerance: Patient  tolerated the procedure well with no immediate complications        Labs Reviewed   CBC W/ AUTO DIFFERENTIAL - Abnormal; Notable for the following components:       Result Value    WBC 17.27 (*)     Platelets 622 (*)     Immature Granulocytes 0.7 (*)     Gran # (ANC) 10.7 (*)     Immature Grans (Abs) 0.12 (*)     Lymph # 5.1 (*)     Mono # 1.1 (*)     All other components within normal limits   COMPREHENSIVE METABOLIC PANEL - Abnormal; Notable for the following components:    Sodium 147 (*)     CO2 21 (*)     Glucose 213 (*)     Creatinine 1.5 (*)     Total Protein 8.9 (*)     Alkaline Phosphatase 158 (*)     Anion Gap 20 (*)     eGFR if non  58.2 (*)     All other components within normal limits   DRUG SCREEN PANEL, URINE EMERGENCY - Abnormal; Notable for the following components:    Creatinine, Urine 532.0 (*)     All other components within normal limits    Narrative:     Specimen Source->Urine   LACTIC ACID, PLASMA - Abnormal; Notable for the following components:    Lactate (Lactic Acid) 14.2 (*)     All other components within normal limits    Narrative:     LACTIC ACID  critical result(s) called and verbal readback obtained   from DI ANDERSEN RN ER. by TC1 04/24/2022 21:00   SALICYLATE LEVEL - Abnormal; Notable for the following components:    Salicylate Lvl <4.0 (*)     All other components within normal limits   ISTAT PROCEDURE - Abnormal; Notable for the following components:    POC PH 7.305 (*)     POC PO2 113 (*)     POC HCO3 20.8 (*)     POC Glucose 235 (*)     POC TCO2 22 (*)     All other components within normal limits   CULTURE, BLOOD   CULTURE, BLOOD   CULTURE, CSF  (INCLUDES STAIN)   TROPONIN I   B-TYPE NATRIURETIC PEPTIDE   SARS-COV-2 RNA AMPLIFICATION, QUAL   PROCALCITONIN   ALCOHOL,MEDICAL (ETHANOL)   GLUCOSE, CSF   PROTEIN, CSF   CSF CELL COUNT WITH DIFFERENTIAL   HERPES SIMPLEX (HSV) BY RAPID PCR, NON-BLOOD   CK   CK   TROPONIN I          Imaging Results          CT Head  Without Contrast (Final result)  Result time 04/24/22 20:15:44    Final result by Tacho Shultz MD (04/24/22 20:15:44)                 Narrative:    Head CT scan without contrast    COMPARISONS: CTA head and neck dated April 18, 2022    ADDITIONAL PERTINENT HISTORY: New onset seizure    TECHNIQUE:  Multiple axial images were obtained from the skull base to the vertex without IV contrast. One of the following dose optimization techniques was utilized in the performance of this exam: Automated exposure control; adjustment of the mA and/or kV according to the patient's size; or use of an iterative  reconstruction technique.  Specific details can be referenced in the facility's radiology CT exam operational policy.    FINDINGS:    Midline shift: Negative    Ventricles:  Negative    Brain parenchyma:  Negative    Extra-axial spaces:  Negative    Intracranial vasculature:  Negative    Osseous structures:  Negative    Paranasal sinuses and mastoid air cells:  'S marked mucosal thickening involving both maxillary sinuses as well as the sphenoid sinuses and ethmoid air cells.    Surrounding soft tissues and orbits:  Negative      IMPRESSION:  1. No acute intracranial pathology.  2. Underlying paranasal sinus disease.    Electronically signed by:  Tacho Shultz MD  4/24/2022 8:15 PM CDT Workstation: [id number]                             X-Ray Chest AP Portable (Final result)  Result time 04/24/22 19:46:26    Final result by Seamus Mercado MD (04/24/22 19:46:26)                 Narrative:    HISTORY: Chest Pain    FINDINGS: Portable chest radiograph at 1921 hours compared to 04/18/2020 shows interval insertion of a nasogastric tube, with the distal tip overlying the proximal gastric body, and the proximal port near the expected level of the gastroesophageal junction. The cardiomediastinal silhouette and pulmonary vasculature are stable and within normal limits.    The lungs are hypoexpanded, with parahilar bandlike and  irregular opacities suggesting atelectasis. No consolidation, large pleural effusion, evidence of pulmonary edema, or pneumothorax. No acute fractures or destructive osseous lesions.    IMPRESSION:  1. Nasogastric tube as described.  2. Pulmonary hypoinflation, with perihilar opacities suggesting atelectasis.    Electronically signed by:  Seamus Mercado MD  4/24/2022 7:46 PM CDT Workstation: [id number]                               Medications   magnesium sulfate 2g in water 50mL IVPB (premix) (has no administration in time range)   propofol (DIPRIVAN) 10 mg/mL infusion (has no administration in time range)   cefTRIAXone (ROCEPHIN) 2 g/50 mL D5W IVPB (has no administration in time range)   vancomycin - pharmacy to dose (has no administration in time range)   acyclovir (ZOVIRAX) 680 mg in dextrose 5 % 100 mL IVPB (has no administration in time range)   propofoL (DIPRIVAN) 10 mg/mL infusion (has no administration in time range)   lactated ringers infusion (has no administration in time range)   lactated ringers infusion (1,000 mLs Intravenous New Bag 4/24/22 2045)   diazePAM injection 5 mg (5 mg Intravenous Given 4/24/22 1850)   lorazepam injection 2 mg (2 mg Intravenous Given 4/24/22 1845)   rocuronium injection 50 mg (50 mg Intravenous Given 4/24/22 2141)     Medical Decision Making:   Initial Assessment:   37-year-old male with history of gastritis, hyperlipidemia, gout.  Patient recently seen emergency department evaluated for possible subarachnoid hemorrhage versus complex partial seizure.  Patient underwent extensive workup including CT head noncontrast study, CTA head and neck as well as MRI brain noncontrast.  Essentially workup proved to be nondiagnostic.  Patient scheduled to follow-up with neurology.  Patient presents emergency department today with anxious hyperexcitable type state.  Per family and EMS patient appear to be hyperventilating and was found to be diaphoretic while at this home.  Patient states  that he did take 2 Prozac pills however denies taking any additional pills.  Upon arrival to the emergency department patient extremely hyper excitable with heart rate 150 and blood pressure 220/110.  Patient at that time was stating that he needed help over and over again and he was feeling extremely anxious.  Patient was profusely diaphoretic.        Differential Diagnosis:   Acute drug troxidone, serotonin syndrome, delirium, complex partial seizures  Clinical Tests:   Lab Tests: Ordered and Reviewed  Radiological Study: Ordered and Reviewed  Medical Tests: Ordered and Reviewed            Attending Attestation:         Attending Critical Care:   Critical Care Times:   Direct Patient Care (initial evaluation, reassessments, and time considering the case)................................................................30 minutes.   Additional History from reviewing old medical records or taking additional history from the family, EMS, PCP, etc.......................10 minutes.   Ordering, Reviewing, and Interpreting Diagnostic Studies...............................................................................................................10 minutes.   Documentation..................................................................................................................................................................................10 minutes.   Consultation with other Physicians. .................................................................................................................................................10 minutes.   ==============================================================  · Total Critical Care Time - exclusive of procedural time: 70 minutes.  ==============================================================  Critical care reasons: Acute delirium.   The following critical care procedures were done by me (see procedure notes): airway management and endotracheal tube placement *.    Critical care was time spent personally by me on the following activities: obtaining history from patient or relative, examination of patient, review of x-rays / CT sent with the patient, review of old charts, ordering lab, x-rays, and/or EKG, development of treatment plan with patient or relative, ordering and performing treatments and interventions, evaluation of patient's response to treatment, discussion with consultants, interpretation of cardiac measurements, re-evaluation of patient's conition and ventilator management.   Critical Care Condition: critical           ED Course as of 04/24/22 2216   Sun Apr 24, 2022   2212 Patient seen evaluated emergency department.  Currently at this time patient found to be diaphoretic with pupils of 8 millimeters in nature.  Patient with combative behavior on arrival.  Patient did state that he had taken Prozac earlier.  At this point time patient appear clear treated for serotonin syndrome.  Patient underwent rapid sequence intubation secondary to acute delirium and combative behavior.  After intubation patient did receive CT noncontrast study of brain which showed no evidence of acute intracranial pathology.  Patient did undergo lumbar puncture emergency department as well and was covered empirically with acyclovir, Rocephin and vancomycin.  Per EMS patient had 1 episodes of brief staring into space which lasted 45 seconds.  Was a short that patient may have had a seizure or not.  Case was discussed with Neurology.  At this time there is no seizure activity noted on his ER stay however patient will undergo EEG in a.m..  Patient's case discussed with poison control.  Goal is to maintain QTC less than 450. If QT prolongation is noted recommend given magnesium.  Patient on arrival with . Patient given 2 grams magnesium will be continue to be monitored.  Patient remained hemodynamically adequate emergency department awaiting admission to hospitalist service.  Will  continue on fluids at 130 cc an hour.   [RM]      ED Course User Index  [RM] Luis Back MD             Clinical Impression:   Final diagnoses:  [R41.0] Delirium, acute (Primary)  [G25.79] Serotonin syndrome  [R41.82] Altered mental status, unspecified altered mental status type          ED Disposition Condition    Admit               Luis Back MD  04/24/22 1935       Luis Back MD  04/24/22 1943       Luis Back MD  04/24/22 5095

## 2022-04-25 NOTE — PROGRESS NOTES
Patient admitted to my service overnight with encephalopathy. Recent admission and discharge after evaluation for neurological symptoms that had resolved.  It was thought that represented a possible TIA.  He returned to the ED with neurological decompensation and confusion and ultimately required intubation.  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  Cultures are in progress. Respiratory viral panel positive for parainfluenza. On current IV abx and antivirals per ID and further infectious and Neuro work up in progress.  Pulmonary, ID, Neurology following.  Patient developed coffee ground, dark appearing OGT suctioning and thus GI consulted. On IV PPI. Stopping scheduled Lorazepam which I think was part of the treatment when serotonin syndrome was considered.  Examined: Intubated and sedated. RRR. Abd soft, Nondistended.  CTA B.

## 2022-04-25 NOTE — CARE UPDATE
PATIENT EXTUBATED T 17:06 to room air,    04/25/22 1706   Ready to Wean Parameters   Extubated? Yes   Reason for Extubation Extubated   Ventilator Discontinued Yes

## 2022-04-26 PROBLEM — A39.81: Status: ACTIVE | Noted: 2022-04-26

## 2022-04-26 PROBLEM — R56.9 SEIZURES: Status: ACTIVE | Noted: 2022-04-26

## 2022-04-26 LAB
ALBUMIN SERPL BCP-MCNC: 3.5 G/DL (ref 3.5–5.2)
ALP SERPL-CCNC: 104 U/L (ref 55–135)
ALT SERPL W/O P-5'-P-CCNC: 24 U/L (ref 10–44)
ANION GAP SERPL CALC-SCNC: 2 MMOL/L (ref 8–16)
AST SERPL-CCNC: 39 U/L (ref 10–40)
BASOPHILS # BLD AUTO: 0.04 K/UL (ref 0–0.2)
BASOPHILS NFR BLD: 0.4 % (ref 0–1.9)
BILIRUB SERPL-MCNC: 0.8 MG/DL (ref 0.1–1)
BUN SERPL-MCNC: 13 MG/DL (ref 6–20)
CALCIUM SERPL-MCNC: 7.9 MG/DL (ref 8.7–10.5)
CHLORIDE SERPL-SCNC: 103 MMOL/L (ref 95–110)
CO2 SERPL-SCNC: 27 MMOL/L (ref 23–29)
CREAT SERPL-MCNC: 1.1 MG/DL (ref 0.5–1.4)
DIFFERENTIAL METHOD: ABNORMAL
EOSINOPHIL # BLD AUTO: 0.1 K/UL (ref 0–0.5)
EOSINOPHIL NFR BLD: 1 % (ref 0–8)
ERYTHROCYTE [DISTWIDTH] IN BLOOD BY AUTOMATED COUNT: 12.5 % (ref 11.5–14.5)
EST. GFR  (AFRICAN AMERICAN): >60 ML/MIN/1.73 M^2
EST. GFR  (NON AFRICAN AMERICAN): >60 ML/MIN/1.73 M^2
ESTIMATED AVG GLUCOSE: 103 MG/DL (ref 68–131)
GLUCOSE SERPL-MCNC: 166 MG/DL (ref 70–110)
HBA1C MFR BLD: 5.2 % (ref 4.5–6.2)
HCT VFR BLD AUTO: 36.1 % (ref 40–54)
HGB BLD-MCNC: 12 G/DL (ref 14–18)
HIV 1+2 AB+HIV1 P24 AG SERPL QL IA: NON REACTIVE
IMM GRANULOCYTES # BLD AUTO: 0.03 K/UL (ref 0–0.04)
IMM GRANULOCYTES NFR BLD AUTO: 0.3 % (ref 0–0.5)
LYMPHOCYTES # BLD AUTO: 2 K/UL (ref 1–4.8)
LYMPHOCYTES NFR BLD: 22.8 % (ref 18–48)
MAGNESIUM SERPL-MCNC: 2.3 MG/DL (ref 1.6–2.6)
MCH RBC QN AUTO: 29.7 PG (ref 27–31)
MCHC RBC AUTO-ENTMCNC: 33.2 G/DL (ref 32–36)
MCV RBC AUTO: 89 FL (ref 82–98)
MISCELLANEOUS TEST NAME: NORMAL
MONOCYTES # BLD AUTO: 0.6 K/UL (ref 0.3–1)
MONOCYTES NFR BLD: 7 % (ref 4–15)
NEUTROPHILS # BLD AUTO: 6.1 K/UL (ref 1.8–7.7)
NEUTROPHILS NFR BLD: 68.5 % (ref 38–73)
NRBC BLD-RTO: 0 /100 WBC
PHOSPHATE SERPL-MCNC: 4.1 MG/DL (ref 2.7–4.5)
PLATELET # BLD AUTO: 284 K/UL (ref 150–450)
PMV BLD AUTO: 9.7 FL (ref 9.2–12.9)
POTASSIUM SERPL-SCNC: 3.8 MMOL/L (ref 3.5–5.1)
PROT SERPL-MCNC: 6.4 G/DL (ref 6–8.4)
RBC # BLD AUTO: 4.04 M/UL (ref 4.6–6.2)
REFERENCE LAB: NORMAL
RPR SER QL: NORMAL
SODIUM SERPL-SCNC: 132 MMOL/L (ref 136–145)
SPECIMEN TYPE: NORMAL
TEST RESULT: NORMAL
VANCOMYCIN SERPL-MCNC: 12.4 UG/ML
WBC # BLD AUTO: 8.89 K/UL (ref 3.9–12.7)

## 2022-04-26 PROCEDURE — 36600 WITHDRAWAL OF ARTERIAL BLOOD: CPT

## 2022-04-26 PROCEDURE — 63600175 PHARM REV CODE 636 W HCPCS: Performed by: FAMILY MEDICINE

## 2022-04-26 PROCEDURE — 86592 SYPHILIS TEST NON-TREP QUAL: CPT | Performed by: INTERNAL MEDICINE

## 2022-04-26 PROCEDURE — 25000003 PHARM REV CODE 250: Performed by: INTERNAL MEDICINE

## 2022-04-26 PROCEDURE — 63600175 PHARM REV CODE 636 W HCPCS: Performed by: STUDENT IN AN ORGANIZED HEALTH CARE EDUCATION/TRAINING PROGRAM

## 2022-04-26 PROCEDURE — 95819 EEG AWAKE AND ASLEEP: CPT

## 2022-04-26 PROCEDURE — 36415 COLL VENOUS BLD VENIPUNCTURE: CPT | Performed by: EMERGENCY MEDICINE

## 2022-04-26 PROCEDURE — 83735 ASSAY OF MAGNESIUM: CPT | Performed by: FAMILY MEDICINE

## 2022-04-26 PROCEDURE — 85025 COMPLETE CBC W/AUTO DIFF WBC: CPT | Performed by: FAMILY MEDICINE

## 2022-04-26 PROCEDURE — 25000003 PHARM REV CODE 250: Performed by: FAMILY MEDICINE

## 2022-04-26 PROCEDURE — 25000003 PHARM REV CODE 250: Performed by: STUDENT IN AN ORGANIZED HEALTH CARE EDUCATION/TRAINING PROGRAM

## 2022-04-26 PROCEDURE — 25500020 PHARM REV CODE 255: Performed by: INTERNAL MEDICINE

## 2022-04-26 PROCEDURE — 20000000 HC ICU ROOM

## 2022-04-26 PROCEDURE — 99900035 HC TECH TIME PER 15 MIN (STAT)

## 2022-04-26 PROCEDURE — 99233 PR SUBSEQUENT HOSPITAL CARE,LEVL III: ICD-10-PCS | Mod: ,,, | Performed by: INTERNAL MEDICINE

## 2022-04-26 PROCEDURE — C9113 INJ PANTOPRAZOLE SODIUM, VIA: HCPCS | Performed by: INTERNAL MEDICINE

## 2022-04-26 PROCEDURE — 63600175 PHARM REV CODE 636 W HCPCS: Performed by: INTERNAL MEDICINE

## 2022-04-26 PROCEDURE — 82803 BLOOD GASES ANY COMBINATION: CPT

## 2022-04-26 PROCEDURE — 80053 COMPREHEN METABOLIC PANEL: CPT | Performed by: FAMILY MEDICINE

## 2022-04-26 PROCEDURE — 94761 N-INVAS EAR/PLS OXIMETRY MLT: CPT

## 2022-04-26 PROCEDURE — 80202 ASSAY OF VANCOMYCIN: CPT | Performed by: FAMILY MEDICINE

## 2022-04-26 PROCEDURE — 27000221 HC OXYGEN, UP TO 24 HOURS

## 2022-04-26 PROCEDURE — 84100 ASSAY OF PHOSPHORUS: CPT | Performed by: INTERNAL MEDICINE

## 2022-04-26 PROCEDURE — A9585 GADOBUTROL INJECTION: HCPCS | Performed by: INTERNAL MEDICINE

## 2022-04-26 PROCEDURE — 99233 SBSQ HOSP IP/OBS HIGH 50: CPT | Mod: ,,, | Performed by: INTERNAL MEDICINE

## 2022-04-26 RX ORDER — GADOBUTROL 604.72 MG/ML
9 INJECTION INTRAVENOUS
Status: COMPLETED | OUTPATIENT
Start: 2022-04-26 | End: 2022-04-26

## 2022-04-26 RX ORDER — LORAZEPAM 2 MG/ML
2 INJECTION INTRAMUSCULAR ONCE
Status: DISCONTINUED | OUTPATIENT
Start: 2022-04-26 | End: 2022-04-26

## 2022-04-26 RX ORDER — LORAZEPAM 2 MG/ML
2 INJECTION INTRAMUSCULAR ONCE
Status: COMPLETED | OUTPATIENT
Start: 2022-04-26 | End: 2022-04-26

## 2022-04-26 RX ORDER — ACETAMINOPHEN 325 MG/1
650 TABLET ORAL EVERY 6 HOURS PRN
Status: DISCONTINUED | OUTPATIENT
Start: 2022-04-26 | End: 2022-04-29 | Stop reason: HOSPADM

## 2022-04-26 RX ORDER — LORAZEPAM 2 MG/ML
2 INJECTION INTRAMUSCULAR ONCE AS NEEDED
Status: DISCONTINUED | OUTPATIENT
Start: 2022-04-26 | End: 2022-04-29 | Stop reason: HOSPADM

## 2022-04-26 RX ORDER — DIVALPROEX SODIUM 250 MG/1
500 TABLET, DELAYED RELEASE ORAL EVERY 8 HOURS
Status: DISCONTINUED | OUTPATIENT
Start: 2022-04-26 | End: 2022-04-29 | Stop reason: HOSPADM

## 2022-04-26 RX ORDER — PANTOPRAZOLE SODIUM 40 MG/10ML
40 INJECTION, POWDER, LYOPHILIZED, FOR SOLUTION INTRAVENOUS DAILY
Status: DISCONTINUED | OUTPATIENT
Start: 2022-04-27 | End: 2022-04-29 | Stop reason: HOSPADM

## 2022-04-26 RX ORDER — NOREPINEPHRINE BITARTRATE/D5W 4MG/250ML
0-3 PLASTIC BAG, INJECTION (ML) INTRAVENOUS CONTINUOUS
Status: DISCONTINUED | OUTPATIENT
Start: 2022-04-26 | End: 2022-04-26

## 2022-04-26 RX ADMIN — DEXMEDETOMIDINE HYDROCHLORIDE 0.2 MCG/KG/HR: 400 INJECTION INTRAVENOUS at 03:04

## 2022-04-26 RX ADMIN — ACYCLOVIR SODIUM 680 MG: 500 INJECTION, SOLUTION INTRAVENOUS at 04:04

## 2022-04-26 RX ADMIN — MUPIROCIN: 20 OINTMENT TOPICAL at 08:04

## 2022-04-26 RX ADMIN — DEXMEDETOMIDINE HYDROCHLORIDE 0.8 MCG/KG/HR: 400 INJECTION INTRAVENOUS at 03:04

## 2022-04-26 RX ADMIN — CHLORHEXIDINE GLUCONATE 15 ML: 1.2 RINSE ORAL at 08:04

## 2022-04-26 RX ADMIN — GADOBUTROL 9 ML: 604.72 INJECTION INTRAVENOUS at 05:04

## 2022-04-26 RX ADMIN — PROMETHAZINE HYDROCHLORIDE 6.25 MG: 25 INJECTION INTRAMUSCULAR; INTRAVENOUS at 08:04

## 2022-04-26 RX ADMIN — DEXTROSE 1500 MG: 50 INJECTION, SOLUTION INTRAVENOUS at 03:04

## 2022-04-26 RX ADMIN — LORAZEPAM 2 MG: 2 INJECTION INTRAMUSCULAR; INTRAVENOUS at 02:04

## 2022-04-26 RX ADMIN — PANTOPRAZOLE SODIUM 40 MG: 40 INJECTION, POWDER, LYOPHILIZED, FOR SOLUTION INTRAVENOUS at 08:04

## 2022-04-26 RX ADMIN — ONDANSETRON 4 MG: 2 INJECTION INTRAMUSCULAR; INTRAVENOUS at 06:04

## 2022-04-26 RX ADMIN — ACYCLOVIR SODIUM 680 MG: 500 INJECTION, SOLUTION INTRAVENOUS at 08:04

## 2022-04-26 RX ADMIN — ACYCLOVIR SODIUM 680 MG: 500 INJECTION, SOLUTION INTRAVENOUS at 12:04

## 2022-04-26 RX ADMIN — VANCOMYCIN HYDROCHLORIDE 1500 MG: 1.5 INJECTION, POWDER, LYOPHILIZED, FOR SOLUTION INTRAVENOUS at 03:04

## 2022-04-26 RX ADMIN — ONDANSETRON 4 MG: 2 INJECTION INTRAMUSCULAR; INTRAVENOUS at 09:04

## 2022-04-26 RX ADMIN — CEFTRIAXONE 2 G: 2 INJECTION, SOLUTION INTRAVENOUS at 08:04

## 2022-04-26 RX ADMIN — ACETAMINOPHEN 650 MG: 325 TABLET ORAL at 09:04

## 2022-04-26 NOTE — HOSPITAL COURSE
4/26/2022  Mr Corral has had right sided rhythmic movements with sleep. His throat is sore post extubation    4/27/2022  I am OK with no complaints save fatigue and weakness. No seizure like activity per mother. Familial A 1 antitrypsin disease with variable penetrance    4/28/2022  Mr Amaral is much more alert and awake with no seizure activity. He walked from ICU to [facility] care.    4/29/2022  I have discussed the case with Dr Ace Goncalves and the Munir family . At this point the patient has no seizures on Depakote, is awake and alert with adequate mobility. Mr Amaral would like to go home  ROS: mild weakness, fatigue see above otherwise negative X 8   PE in no distress HEENT BJORN EOM intact moist mucus membranes Neck supple no use of accessory muscles Lungs no crackles wheezes or rhonchi Heart S 1 S 2 RRR no murmur Ext without CC or E pulses 1-2+ skin no acute finding Neuro mildly dulled affect no seizure activity no acute sensory or motor deficit

## 2022-04-26 NOTE — PROGRESS NOTES
[facility]  Adult Nutrition   Progress Note (Follow-Up)    SUMMARY     Recommendations/Interventions:    Recommendation/Intervention: 1. Hyponatremia (132), Hyperglycemia (166)-continue to monitor and managed per MD. 2. Continue current diet as tolerated, encourage intake. 3.  to assist in meal choices daily.  Goals: 1. Blood glucose and electrolytes monitored and managed. 2. Patient to meet at least 75% of estimated needs through meal intake.  Nutrition Goal Status: new    Dietitian Rounds Brief:  · Follow-up. Patient extubated. Diet just advanced. Per progress notes: "Mother states privately that he is not at baseline personality and is more angry and aggressive and this is much different than usual. " Will monitor diet tolerance.  Reason for Assessment  Reason For Assessment: RD follow-up  Diagnosis:  (meningitis)  Relevant Medical History: serotonin syndrome, HLD, HLD, anxiety/depression  Interdisciplinary Rounds: attended    Nutrition Risk Screen  Nutrition Risk Screen: no indicators present     MST Score: 2  Have you recently lost weight without trying?: Unsure  Weight loss score: 2  Have you been eating poorly because of a decreased appetite?: No  Appetite score: 0       Nutrition/Diet History  Food Allergies: NKFA  Factors Affecting Nutritional Intake: impaired cognitive status/motor control    Anthropometrics  Temp: 98.6 °F (37 °C)  Height Method: Measured  Height: 6' 2" (188 cm)  Height (inches): 74 in  Weight Method: Bed Scale  Weight: 99.3 kg (218 lb 14.7 oz)  Weight (lb): 218.92 lb  Ideal Body Weight (IBW), Male: 190 lb  % Ideal Body Weight, Male (lb): 115.22 %  BMI (Calculated): 28.1  BMI Grade: 25 - 29.9 - overweight     Weight History:  Wt Readings from Last 10 Encounters:   04/25/22 99.3 kg (218 lb 14.7 oz)   04/18/22 102.2 kg (225 lb 5 oz)   04/19/22 102.1 kg (225 lb)   03/22/22 106.2 kg (234 lb 1.6 oz)   01/14/22 106.1 kg (233 lb 12.8 oz)   06/11/21 99 kg (218 lb 4.8 oz) "   03/01/21 98.4 kg (217 lb)   12/07/20 97.3 kg (214 lb 8 oz)   05/19/20 99 kg (218 lb 4.8 oz)   02/04/20 98.7 kg (217 lb 8 oz)     Lab/Procedures/Meds: Pertinent Labs Reviewed  Clinical Chemistry:  Recent Labs   Lab 04/25/22  0343 04/26/22  0501    132*   K 4.1 3.8    103   CO2 24 27   * 166*   BUN 13 13   CREATININE 1.3 1.1   CALCIUM 9.1 7.9*   PROT 7.5 6.4   ALBUMIN 4.4 3.5   BILITOT 0.5 0.8   ALKPHOS 132 104   AST 31 39   ALT 25 24   ANIONGAP 11 2*   ESTGFRAFRICA >60.0 >60.0   EGFRNONAA >60.0 >60.0   MG 2.5 2.3   PHOS 3.6 4.1     CBC:   Recent Labs   Lab 04/26/22  0501   WBC 8.89   RBC 4.04*   HGB 12.0*   HCT 36.1*      MCV 89   MCH 29.7   MCHC 33.2     Lipid Panel:  Recent Labs   Lab 04/25/22  0343   TRIG 257*     Cardiac Profile:  Recent Labs   Lab 04/24/22  1911 04/24/22  2216   BNP 76  --    CPK  --  592*  592*   TROPONINI <0.030 <0.030     Inflammatory Labs:  Recent Labs   Lab 04/25/22  0343   CRP 0.55     Diabetes:  Recent Labs   Lab 04/25/22  0009   HGBA1C 5.2     Medications: Pertinent Medications reviewed  Scheduled Meds:   acyclovir  10 mg/kg (Ideal) Intravenous Q8H    cefTRIAXone (ROCEPHIN) IVPB  2 g Intravenous Q12H    chlorhexidine  15 mL Mouth/Throat BID    mupirocin   Nasal BID    pantoprazole  40 mg Intravenous BID    vancomycin (VANCOCIN) IVPB  1,500 mg Intravenous Q12H     Continuous Infusions:   sodium chloride 0.9% 100 mL/hr at 04/25/22 1335    dexmedetomidine (PRECEDEX) infusion Stopped (04/26/22 0728)    fentanyl Stopped (04/25/22 1702)    NORepinephrine bitartrate-D5W      propofoL Stopped (04/25/22 1638)     PRN Meds:.acetaminophen, albuterol-ipratropium, calcium chloride IVPB, calcium chloride IVPB, calcium chloride IVPB, dextrose 10%, dextrose 10%, dextrose 50%, dextrose 50%, glucagon (human recombinant), glucose, glucose, hydrALAZINE, hydrALAZINE, lorazepam, magnesium oxide, magnesium sulfate IVPB, magnesium sulfate IVPB, magnesium sulfate IVPB,  "magnesium sulfate IVPB, naloxone, ondansetron, potassium chloride in water, potassium chloride in water, potassium chloride in water, potassium chloride in water, potassium chloride, potassium chloride, potassium chloride, potassium chloride, promethazine (PHENERGAN) IVPB, sodium chloride 0.9%, sodium phosphate IVPB, sodium phosphate IVPB, sodium phosphate IVPB, sodium phosphate IVPB, sodium phosphate IVPB, Pharmacy to dose Vancomycin consult **AND** vancomycin - pharmacy to dose    Antibiotics (From admission, onward)            Start     Stop Route Frequency Ordered    04/25/22 1600  vancomycin (VANCOCIN) 1,500 mg in dextrose 5 % 500 mL IVPB         -- IV Every 12 hours (non-standard times) 04/25/22 0510    04/25/22 0945  mupirocin 2 % ointment  (MRSA Decolonization Orders [facility])         04/30 0859 Nasl 2 times daily 04/25/22 0844    04/25/22 0900  cefTRIAXone (ROCEPHIN) 2 g/50 mL D5W IVPB         -- IV Every 12 hours (non-standard times) 04/25/22 0737    04/24/22 2300  vancomycin - pharmacy to dose  (vancomycin IVPB)        "And" Linked Group Details    -- IV pharmacy to manage frequency 04/24/22 2203        Estimated/Assessed Needs  Weight Used For Calorie Calculations: 99.3 kg (218 lb 14.7 oz)  Energy Calorie Requirements (kcal): 0004-6278 kcals/day (20-25 kcals/kg)  Energy Need Method: Kcal/kg  Protein Requirements: 119-150 g/day (1.2-1.5 g/kg)  Weight Used For Protein Calculations: 99.3 kg (218 lb 14.7 oz)  Fluid Requirements (mL): 1 mL/kcal or per MD     RDA Method (mL): 1986       Nutrition Prescription Ordered    Current Diet Order: Adult Regular (IDDSI Level 7)    Evaluation of Received Nutrient/Fluid Intake    Other Calories (kcal): 0  IV Fluid (mL): 2400 (0.9% NaCl @ 100 mL/hr)  Energy Calories Required: not meeting needs  Protein Required: not meeting needs  Fluid Required: meeting needs  Tolerance:  (Diet just advanced)  % Intake of Estimated Energy Needs: 0 - 25 %  % Meal Intake: 0 - 25 " %    Intake/Output Summary (Last 24 hours) at 4/26/2022 1112  Last data filed at 4/26/2022 0701  Gross per 24 hour   Intake 4386.64 ml   Output 1675 ml   Net 2711.64 ml      Nutrition Risk    Level of Risk/Frequency of Follow-up: high   Monitor and Evaluation    Food and Nutrient Intake: energy intake, food and beverage intake  Food and Nutrient Adminstration: diet order  Physical Activity and Function: nutrition-related ADLs and IADLs, factors affecting access to physical activity  Anthropometric Measurements: weight, weight change, body mass index  Biochemical Data, Medical Tests and Procedures: electrolyte and renal panel, gastrointestinal profile, lipid profile, inflammatory profile, glucose/endocrine profile  Nutrition-Focused Physical Findings: overall appearance     Nutrition Follow-Up    RD Follow-up?: Yes  Mary Jane Seymour RD 04/26/2022 11:13 AM

## 2022-04-26 NOTE — PLAN OF CARE
04/26/22 0800   Patient Assessment/Suction   Level of Consciousness (AVPU) alert   Respiratory Effort Normal   Expansion/Accessory Muscles/Retractions no use of accessory muscles   Rhythm/Pattern, Respiratory pattern regular;depth regular   PRE-TX-O2   O2 Device (Oxygen Therapy) nasal cannula   $ Is the patient on Low Flow Oxygen? Yes   SpO2 98 %   Pulse Oximetry Type Continuous   $ Pulse Oximetry - Multiple Charge Pulse Oximetry - Multiple   Oximetry Probe Site Assessed;Intact   Pulse 60   Resp 18   Aerosol Therapy   $ Aerosol Therapy Charges PRN treatment not required   Labs   $ Was an ABG obtained? Arterial Puncture   $ Labs Tech Time 15 min

## 2022-04-26 NOTE — PROGRESS NOTES
Therapy with Vancomycin complete and / or consult / order discontinued by Dr. Burton on 04/26/2022 @ 1805  Erasmo Dhillon 4/26/2022 6:34 PM  Dept of Pharmacy  Ext [phone]

## 2022-04-26 NOTE — NURSING
Pt up to bedside chair with minimal assist x 1. Up to chair for an hour then back to bed per request. Pt does c/o headache and some nausea. Prn meds given. Will cont to monitor

## 2022-04-26 NOTE — PROCEDURES
EEG REPORT    NAME: Iván Amaral  : 1985  MRN: [id number]    DATE of EE2022    CLINICAL INDICATION: 37-year-old gentleman with no significant past medical history who presented with altered mental status, and possibly had seizure-like event prior to presentation to the emergency room.  He was agitated, combative, hypertensive and tachycardic requiring intubation and sedation.  LP was done in the emergency room with evidence of  meningoencephalitis. Has had intermittent jerking of the extremities concerning for seizures.    MEDICATIONS:  Scheduled Meds:   acyclovir  10 mg/kg (Ideal) Intravenous Q8H    cefTRIAXone (ROCEPHIN) IVPB  2 g Intravenous Q12H    chlorhexidine  15 mL Mouth/Throat BID    divalproex  500 mg Oral Q8H    mupirocin   Nasal BID    [START ON 2022] pantoprazole  40 mg Intravenous Daily    vancomycin (VANCOCIN) IVPB  1,500 mg Intravenous Q12H     Continuous Infusions:  PRN Meds:.acetaminophen, albuterol-ipratropium, calcium chloride IVPB, calcium chloride IVPB, calcium chloride IVPB, dextrose 10%, dextrose 10%, dextrose 50%, dextrose 50%, glucagon (human recombinant), glucose, glucose, hydrALAZINE, hydrALAZINE, lorazepam, lorazepam, magnesium oxide, magnesium sulfate IVPB, magnesium sulfate IVPB, magnesium sulfate IVPB, magnesium sulfate IVPB, naloxone, ondansetron, potassium chloride in water, potassium chloride in water, potassium chloride in water, potassium chloride in water, potassium chloride, potassium chloride, potassium chloride, potassium chloride, promethazine (PHENERGAN) IVPB, sodium chloride 0.9%, sodium phosphate IVPB, sodium phosphate IVPB, sodium phosphate IVPB, sodium phosphate IVPB, sodium phosphate IVPB, Pharmacy to dose Vancomycin consult **AND** vancomycin - pharmacy to dose      EEG DESCRIPTION:  A 16-channel EEG was performed with electrode placement in 10/20 International System. Longitudinal bipolar and referential montages were utilized in  analysis.    This is a technically adequate study with minor muscle and motion artifacts.    The dominant posterior background rhythm was a well modulated, symmetric, synchronous, reactive, 7-8 Hz.    The record was reactive to eye opening and closure. Anterior derivations showed the expected admixture of low-voltage beta and theta frequencies as well as intermittent eye blink artifact.    Drowsiness was characterized by voltage attenuation and lateral eye movements. Increased beta activity is seen throughout.     There is Frontal Intermittent Rhythmic Delta Activity (FIRDA) sometimes associated to jerking of upper extremities concerning for focal seizures, but these do not generalize.    Photic stimulation, performed elicited no driving response. Hyperventilation  was not performed.    DIAGNOSIS: This is a abnormal EEG due to the presence of mild to moderate generalized slowing consistent encephalopathy as well as Frontal Intermittent Rhythmic Delta Activity (FIRDA) with clinical correlation noted concerning for focal seizures. These do not evolve into generalized epileptiform activity.       Flor Navarro MD  Neurology

## 2022-04-26 NOTE — PROGRESS NOTES
Patient Name: Iván Amaral  Patient MRN: [id number]  Patient : 1985    Admit Date: 2022  Service date: 2022    Reason for Consult: coffee ground aspirate in NGT    PCP: Hugo Moran MD    S: Extubated w/o issues. No signs of clinical bleeding. Mild nausea but ate chicken salad and peaches.     Inpatient Medications:   acyclovir  10 mg/kg (Ideal) Intravenous Q8H    cefTRIAXone (ROCEPHIN) IVPB  2 g Intravenous Q12H    chlorhexidine  15 mL Mouth/Throat BID    mupirocin   Nasal BID    pantoprazole  40 mg Intravenous BID    vancomycin (VANCOCIN) IVPB  1,500 mg Intravenous Q12H     acetaminophen, albuterol-ipratropium, calcium chloride IVPB, calcium chloride IVPB, calcium chloride IVPB, dextrose 10%, dextrose 10%, dextrose 50%, dextrose 50%, glucagon (human recombinant), glucose, glucose, hydrALAZINE, hydrALAZINE, lorazepam, magnesium oxide, magnesium sulfate IVPB, magnesium sulfate IVPB, magnesium sulfate IVPB, magnesium sulfate IVPB, naloxone, ondansetron, potassium chloride in water, potassium chloride in water, potassium chloride in water, potassium chloride in water, potassium chloride, potassium chloride, potassium chloride, potassium chloride, promethazine (PHENERGAN) IVPB, sodium chloride 0.9%, sodium phosphate IVPB, sodium phosphate IVPB, sodium phosphate IVPB, sodium phosphate IVPB, sodium phosphate IVPB, Pharmacy to dose Vancomycin consult **AND** vancomycin - pharmacy to dose    Review of Systems:  A 10 point review of systems was performed and was normal, except as mentioned in the HPI, including constitutional, HEENT, heme, lymph, cardiovascular, respiratory, gastrointestinal, genitourinary, neurologic, endocrine, psychiatric and musculoskeletal.      OBJECTIVE:    Physical Exam:  24 Hour Vital Sign Ranges: Temp:  [98.2 °F (36.8 °C)-98.8 °F (37.1 °C)] 98.6 °F (37 °C)  Pulse:  [] 69  Resp:  [11-45] 23  SpO2:  [92 %-99 %] 99 %  BP: ()/(50-76) 106/63  Most recent  "vitals: /63 (BP Location: Left arm, Patient Position: Lying)   Pulse 69   Temp 98.6 °F (37 °C) (Oral)   Resp (!) 23   Ht 6' 2" (1.88 m)   Wt 99.3 kg (218 lb 14.7 oz)   SpO2 99%   BMI 28.11 kg/m²    GEN: well-developed, well-nourished, awake and alert, non-toxic appearing adult  HEENT: PERRL, sclera anicteric, oral mucosa pink and moist without lesion  NECK: trachea midline; Good ROM  CV: regular rate and rhythm, no murmurs or gallops  RESP: clear to auscultation bilaterally, no wheezes, rhonci or rales  ABD: soft, non-tender, non-distended, normal bowel sounds  EXT: no swelling or edema, 2+ pulses distally  SKIN: no rashes or jaundice  PSYCH: flat affect    Labs:   Recent Labs     04/24/22  1911 04/25/22  0343 04/26/22  0501   WBC 17.27* 13.88* 8.89   MCV 85 86 89   * 373 284     Recent Labs     04/25/22  0009 04/25/22  0343 04/26/22  0501    138 132*   K 3.8 4.1 3.8    103 103   CO2 23 24 27   BUN 15 13 13   * 123* 166*     No results for input(s): ALB in the last 72 hours.    Invalid input(s): ALKP, SGOT, SGPT, TBIL, DBIL, TPRO  Recent Labs     04/25/22  0343   INR 1.1         Radiology Review:  X-Ray Chest AP Portable   Final Result      Removal of endotracheal tube and enteric catheter with slight improving aeration of bilateral pulmonary opacities.         Electronically signed by: Tru Morales MD   Date:    04/26/2022   Time:    06:18      MRI Brain W WO Contrast         X-Ray Chest AP Portable   Final Result      X-Ray Chest 1 View   Final Result      CT Head Without Contrast   Final Result      X-Ray Chest AP Portable   Final Result      X-Ray Chest 1 View    (Results Pending)         IMPRESSION / RECOMMENDATIONS:  36 y/o. WM w/ho TIA, HLD, gout that presented w/ AMS, extubated and undergoing neurologic w/u. Had some coffee maroon tinged aspirate from gastric tube. Mild drop in Hg o/n w/o any signs of overt bleeding. Offered EGD to evaluate but family / patient hesitant " currently. RIsks, benefits, alternatives discussed in detail regarding upcoming procedures and sedation and possible complications. All questions answered and will proceed with procedure as planned. Discussed delay in dx w/ both patient / family in room.     -Conservative for now per patient / family  -PPI x 1 month  -Avoid NSAIDs x 2-3 weeks  -Revisit EGD if any concerns for ongoing bleeding.     Amarjit FERNANDEZ Dale[place] III  4/26/2022  2:04 PM

## 2022-04-26 NOTE — PROGRESS NOTES
[facility] Medicine  Progress Note    Patient Name: Iván Amaral  MRN: [id number]  Patient Class: IP- Inpatient   Admission Date: 4/24/2022  Length of Stay: 2 days  Attending Physician: Jakob Cadena MD  Primary Care Provider: Hugo Moran MD        Subjective:     Principal Problem:Meningitis        HPI:  No notes on file    Overview/Hospital Course:  4/26/2022  Mr Corral has had right sided rhythmic movements with sleep. His throat is sore post extubation      Interval History: Mr Amaral has had seizure activity and is undergoing treatment with depakote per neurology. Supplemental ativan has been ordered    Review of Systems   Constitutional:  Positive for activity change, diaphoresis and fatigue. Negative for chills and fever.   HENT:  Positive for sore throat.    Eyes: Negative.    Respiratory: Negative.     Cardiovascular: Negative.    Gastrointestinal:  Positive for abdominal distention. Negative for abdominal pain, nausea and vomiting.   Endocrine: Positive for heat intolerance.   Genitourinary: Negative.    Musculoskeletal:  Positive for arthralgias, gait problem and myalgias.   Skin: Negative.    Allergic/Immunologic: Positive for immunocompromised state.   Neurological:  Positive for weakness.   Hematological:  Bruises/bleeds easily.   Psychiatric/Behavioral:  Positive for decreased concentration and dysphoric mood. The patient is nervous/anxious.    Objective:     Vital Signs (Most Recent):  Temp: 98.4 °F (36.9 °C) (04/26/22 1600)  Pulse: 62 (04/26/22 1600)  Resp: 16 (04/26/22 1600)  BP: 117/66 (04/26/22 1600)  SpO2: 100 % (04/26/22 1600) Vital Signs (24h Range):  Temp:  [98.2 °F (36.8 °C)-98.8 °F (37.1 °C)] 98.4 °F (36.9 °C)  Pulse:  [53-99] 62  Resp:  [11-45] 16  SpO2:  [92 %-100 %] 100 %  BP: ()/(50-76) 117/66     Weight: 99.3 kg (218 lb 14.7 oz)  Body mass index is 28.11 kg/m².    Intake/Output Summary (Last 24 hours) at 4/26/2022 9691  Last data filed at  4/26/2022 1700  Gross per 24 hour   Intake 5655.81 ml   Output 1300 ml   Net 4355.81 ml      Physical Exam  Vitals and nursing note reviewed.   Constitutional:       General: He is not in acute distress.  HENT:      Head: Normocephalic and atraumatic.      Nose: Nose normal.      Mouth/Throat:      Mouth: Mucous membranes are moist.   Eyes:      Extraocular Movements: Extraocular movements intact.      Pupils: Pupils are equal, round, and reactive to light.   Cardiovascular:      Rate and Rhythm: Normal rate and regular rhythm.      Heart sounds:     Gallop present.   Pulmonary:      Effort: Pulmonary effort is normal.      Breath sounds: Normal breath sounds.   Abdominal:      General: Bowel sounds are normal. There is no distension.      Tenderness: There is no abdominal tenderness. There is no guarding or rebound.   Musculoskeletal:         General: Normal range of motion.   Skin:     General: Skin is warm.   Neurological:      General: No focal deficit present.      Mental Status: He is alert.   Psychiatric:      Comments: Dulled and mildly dysphoric affect       Significant Labs: All pertinent labs within the past 24 hours have been reviewed.  Recent Lab Results         04/26/22  0501        Albumin 3.5       Alkaline Phosphatase 104       ALT 24       Anion Gap 2       AST 39       Baso # 0.04       Basophil % 0.4       BILIRUBIN TOTAL 0.8  Comment: For infants and newborns, interpretation of results should be based  on gestational age, weight and in agreement with clinical  observations.    Premature Infant recommended reference ranges:  Up to 24 hours.............<8.0 mg/dL  Up to 48 hours............<12.0 mg/dL  3-5 days..................<15.0 mg/dL  6-29 days.................<15.0 mg/dL         BUN 13       Calcium 7.9       Chloride 103       CO2 27       Creatinine 1.1       Differential Method Automated       eGFR if  >60.0       eGFR if non  >60.0  Comment: Calculation  used to obtain the estimated glomerular filtration  rate (eGFR) is the CKD-EPI equation.          Eos # 0.1       Eosinophil % 1.0       Glucose 166       Gran # (ANC) 6.1       Gran % 68.5       Hematocrit 36.1       Hemoglobin 12.0       Immature Grans (Abs) 0.03  Comment: Mild elevation in immature granulocytes is non specific and   can be seen in a variety of conditions including stress response,   acute inflammation, trauma and pregnancy. Correlation with other   laboratory and clinical findings is essential.         Immature Granulocytes 0.3       Lymph # 2.0       Lymph % 22.8       Magnesium 2.3       MCH 29.7       MCHC 33.2       MCV 89       Mono # 0.6       Mono % 7.0       MPV 9.7       nRBC 0       Phosphorus 4.1       Platelets 284       Potassium 3.8       PROTEIN TOTAL 6.4       RBC 4.04       RDW 12.5       RPR Non-reactive       Sodium 132       WBC 8.89               Significant Imaging: I have reviewed all pertinent imaging results/findings within the past 24 hours.      Assessment/Plan:      Meningococcal encephalitis  Under treatment  Case discussed with neurology  PT and OT consulted for associated debility    Seizures  Treated with depakote        VTE Risk Mitigation (From admission, onward)         Ordered     IP VTE HIGH RISK PATIENT  Once         04/24/22 2316     Place sequential compression device  Until discontinued         04/24/22 2316                Discharge Planning   STANISLAW:  4/30/2022    Code Status: Full Code   Is the patient medically ready for discharge?:     Reason for patient still in hospital (select all that apply): Patient unstable, Patient new problem, Treatment and Consult recommendations  Discharge Plan A: Home                  Jakob Cadena MD  Department of Hospital Medicine   [facility]

## 2022-04-26 NOTE — PROGRESS NOTES
Consult Note  Infectious Disease    Reason for Consult:  Meningitis    HPI: Iván Amaral is a 37 y.o. male Was recently hospitalized as 4/18 until 4/19 with transient numbness, tingling of the right hand with clumsiness, right leg weakness and right-sided facial weakness.  CT scan of the head done in the emergency room showed a possible small subarachnoid hemorrhage.  He was seen by Neurology and had an MRI suggesting no subarachnoid hemorrhage.  He was placed on lipid-lowering agent, aspirin has for TIA and released.  He was brought back to the emergency room yesterday by EMS because of unresponsive state, staring to the left lasting approximately 45-60 seconds with subsequent difficulty swallowing and with expressive aphasia.  The ER record states that family noted him to be hyperventilating and diaphoretic.  The patient apparently stated that he took 2 Prozac pills and in the emergency room he was tachycardic and hypertensive.  Repeat CT head showed no acute intracranial pathology but underlying paranasal sinus disease.  History and physical states that mother reported recurrence of headache with paresthesias on 04/20 and these resolved as well.  His mother who is a nurse had came to stay with him, and noted a lingering cough and the patient did have an upper respiratory infection a few weeks ago and his roommate recently had COVID.  Mother also reported to admitting physician that his home environment is unclean, there are cats, mold and trash everywhere.  In the emergency room he was intubated/sedated due to erratic and unsafe behavior, a lumbar puncture was performed demonstrating 2900 red blood cells and 225 white blood cells 88% lymphocytes with a very elevated protein of 186. .  Serotonin syndrome was suspected as well.  He was begun on ceftriaxone vancomycin and  IV acyclovir and admitted to the intensive care unit    Lactic acid was 14, white blood cells 17,000, creatinine 1.5, glucose 213, CPK  was 592  He has no fever and WBC are improved, heart rate is less than 100, BP still elevated, seem to have some posturing. HIV negative 2020.     4/26: interim reviewed. Visited yesterday afternoon and he was extubated and mother at bedside. CSF PCR panel was negative for all. CSF culture is negative so far. Respiratory viral panel(NP) was positive for parainfluenza 3. This is not included in the CSF pcr panel. Parainfluenza can cause meningitis. No fever, WBC improved. CXR with trace atelectasis left base. Only normal gunjan on sputum culture and urine and blood cultures negative. . He is alert and interactive. He denies headache now and describes chronic frontal headaches, which were worse recently. Mother states privately that he is not at baseline personality and is more angry and aggressive and this is much different than usual. He desires mejia out and diet and will order.        EXAM & DIAGNOSTICS REVIEWED:   Vitals:     Temp:  [98.2 °F (36.8 °C)-98.8 °F (37.1 °C)]   Temp: 98.6 °F (37 °C) (04/26/22 0301)  Pulse: 65 (04/26/22 0715)  Resp: (!) 24 (04/26/22 0715)  BP: (!) 85/51 (04/26/22 0715)  SpO2: 96 % (04/26/22 0715)    Intake/Output Summary (Last 24 hours) at 4/26/2022 0750  Last data filed at 4/26/2022 0500  Gross per 24 hour   Intake 4386.64 ml   Output 1750 ml   Net 2636.64 ml       General:  Alert and cooperative  Eyes:  Anicteric, pupils small but reactive  ENT:  No ulcers, exudates, thrush, nares patent, dentition is very good  Neck:  Completely supple, no masses or adenopathy appreciated  Lungs: Faint basilar crackles  Heart:  RRR, no gallop/murmur/rub noted  Abd:  Soft, NT, ND, normal BS, no masses or organomegaly appreciated.  No perirectal or perianal lesions  :   Mejia, urine clear, no flank tenderness, no genital lesions  Musc:  Joints without effusion, swelling, erythema, synovitis, muscle wasting.   Skin:  No rashes. No palmar or plantar lesions. No subungual petechiae. No track  marks  Neuro: Alert , face symmetric, speech is fluent, no focal motor deficit. Able to answer questions     Psych:   Calm and cooperative  Lymphatic:     No cervical, supraclavicular nodes  Extrem: No edema, erythema, phlebitis, cellulitis, warm and well perfused  VAD:   Right arm picc    Isolation:  none  Wound: none         General Labs reviewed:  Recent Labs   Lab 04/24/22 1911 04/24/22 2031 04/25/22  0045 04/25/22  0343 04/26/22  0501   WBC 17.27*  --   --  13.88* 8.89   HGB 16.7  --   --  15.4 12.0*   HCT 48.5   < > 42 44.0 36.1*   *  --   --  373 284    < > = values in this interval not displayed.       Recent Labs   Lab 04/24/22 1911 04/25/22  0009 04/25/22 0343 04/26/22  0501   * 139 138 132*   K 4.6 3.8 4.1 3.8    102 103 103   CO2 21* 23 24 27   BUN 13 15 13 13   CREATININE 1.5* 1.3 1.3 1.1   CALCIUM 10.0 9.3 9.1 7.9*   PROT 8.9*  --  7.5 6.4   BILITOT 0.6  --  0.5 0.8   ALKPHOS 158*  --  132 104   ALT 27  --  25 24   AST 29  --  31 39     Recent Labs   Lab 04/25/22  0343   CRP 0.55        Latest Reference Range & Units 04/24/22 22:00   COLOR CSF Colorless  Red !   Heme Aliquot mL 3.0   Appearance, CSF Clear  Bloody !   RBC, CSF 0 /cu mm 2965 !   WBC, CSF 0 - 5 /cu mm 225 (HH) [1]   Segmented Neutrophils, CSF 0 - 6 % 2   Lymphs, CSF 40 - 80 % 88 (H)   Mono/Macrophage, CSF 15 - 45 % 10 (L)   Glucose, CSF 40 - 70 mg/dL 80 (H) [2]   Protein, CSF 15 - 40 mg/dL 186 (H) [3]       Micro:  Microbiology Results (last 7 days)     Procedure Component Value Units Date/Time    Culture, Respiratory with Gram Stain [[id number]] Collected: 04/25/22 0000    Order Status: Completed Specimen: Respiratory from Endotracheal Aspirate Updated: 04/26/22 0747     Respiratory Culture No Normal respiratory gunjan     Gram Stain (Respiratory) <10 epithelial cells per low power field.     Gram Stain (Respiratory) Many WBC's     Gram Stain (Respiratory) Many Gram positive cocci     Gram Stain (Respiratory) Few  Gram negative rods    Urine Culture High Risk [[id number]] Collected: 04/25/22 0143    Order Status: Completed Specimen: Urine, Catheterized Updated: 04/26/22 0712     Urine Culture, Routine No growth to date    Narrative:      Indicated criteria for high risk culture:->Other  Other (specify):->HIGH RISK    Blood culture [[id number]] Collected: 04/24/22 1920    Order Status: Completed Specimen: Blood from Peripheral, Antecubital, Right Updated: 04/25/22 2032     Blood Culture, Routine No Growth to date      No Growth to date    Blood culture [[id number]] Collected: 04/24/22 1925    Order Status: Completed Specimen: Blood from Peripheral, Antecubital, Right Updated: 04/25/22 2032     Blood Culture, Routine No Growth to date      No Growth to date    Respiratory Infection Panel (PCR), Nasopharyngeal [[id number]]  (Abnormal) Collected: 04/25/22 0347    Order Status: Completed Updated: 04/25/22 1505     Respiratory Infection Panel Source NP swab     Adenovirus Not Detected     Coronavirus 229E, Common Cold Virus Not Detected     Coronavirus HKU1, Common Cold Virus Not Detected     Coronavirus NL63, Common Cold Virus Not Detected     Coronavirus OC43, Common Cold Virus Not Detected     Comment: The Coronavirus strains detected in this test cause the common cold.  These strains are not the COVID-19 (novel Coronavirus)strain   associated with the respiratory disease outbreak.          SARS-CoV2 (COVID-19) Qualitative PCR Not Detected     Human Metapneumovirus Not Detected     Human Rhinovirus/Enterovirus Not Detected     Influenza A (subtypes H1, H1-2009,H3) Not Detected     Influenza B Not Detected     Parainfluenza Virus 1 Not Detected     Parainfluenza Virus 2 Not Detected     Parainfluenza Virus 3 Detected     Parainfluenza Virus 4 Not Detected     Respiratory Syncytial Virus Not Detected     Bordetella Parapertussis (HD7038) Not Detected     Bordetella pertussis (ptxP) Not Detected     Chlamydia pneumoniae Not Detected      Mycoplasma pneumoniae Not Detected     Comment: Respiratory Infection Panel testing performed by Multiplex PCR.       Narrative:      Specimen Source->Nasopharyngeal Swab    Fungus culture [[id number]] Collected: 04/24/22 2200    Order Status: No result Specimen: CSF (Spinal Fluid) Updated: 04/25/22 0814    CSF culture and Gram Stain (Tube 2) [[id number]] Collected: 04/24/22 2200    Order Status: Completed Specimen: CSF (Spinal Fluid) from CSF Tap, Tube 2 Updated: 04/25/22 0807     CSF CULTURE No Growth to date     Gram Stain Result Few WBC's      No organisms seen    Narrative:      On which sequentially labeled tube should this analysis be  performed?->2    Gladys Ink (CSF) [[id number]] Collected: 04/24/22 2200    Order Status: Completed Specimen: CSF (Spinal Fluid) Updated: 04/25/22 0805     Gladys Ink No encapsulated yeast seen    Fungus culture [[id number]]     Order Status: No result Specimen: CSF (Spinal Fluid) from CSF Tap, Tube 1     Cryptococcal antigen [[id number]]     Order Status: Completed Specimen: Blood, Venous     Gladys Ink (CSF) [[id number]]     Order Status: Completed Specimen: CSF (Spinal Fluid) from CSF Tap, Tube 1     MRSA Screen by PCR [[id number]] Collected: 04/25/22 0347    Order Status: Completed Specimen: Nasopharyngeal Swab from Nasal Updated: 04/25/22 0559     MRSA SCREEN BY PCR Negative    Group A Strep, Molecular [[id number]] Collected: 04/25/22 0347    Order Status: Completed Specimen: Throat Updated: 04/25/22 0459     Group A Strep, Molecular Negative     Comment: Arcanobacterium haemolyticum and Beta Streptococcus group C   and G will not be detected by this test method.  Please order   Throat Culture ([id number]) if suspected.         Resp Viral Panel PCR, Peds Under 7 Months Nasopharyngeal Swab [[id number]] Collected: 04/25/22 0347    Order Status: Canceled           Imaging Reviewed:   CXR   CT head   IMPRESSION:  1. No acute intracranial pathology.  2. Underlying paranasal sinus  disease.    Cardiology:  TTE 4/19  · The left ventricle is normal in size with concentric remodeling and normal systolic function.  · The estimated ejection fraction is 65%.  · Normal left ventricular diastolic function.  · There is no evidence of intracardiac shunting. Bubble study negative for PFO  · Atrial fibrillation not observed.  · Normal right ventricular size with normal right ventricular systolic function.  · Normal central venous pressure (3 mmHg).  · The estimated PA systolic pressure is 11 mmHg.         IMPRESSION & PLAN   1. Meningoencephalitis   Cell count/diff suggests non bacterial etiology   HIV negative 2020 makes fungal and opportunistic meningoencephalitis much less likely   Negative CSF pcr panel, additional serologies pending   Behavioral/personality changes    2. Recent TIA symptoms, negative MRI, likely prodrome for this illness  3. Paranasal sinus disease, more on 4/24 CT than last week   Parainfluenza 3 positive on nasal pcr    4. ?serotonin syndrome      Recommendations:  Continue vanc, rocephin, acyclovir  Awaiting cryptococcal antigen, HIV Ab/Ag, arbovirus antibody panel, West Nile Ab,   EEG and MRI pending       [phone] mother       Medical Decision Making during this encounter was  [_] Low Complexity  [_] Moderate Complexity  [ xxx ] High Complexity

## 2022-04-26 NOTE — PROGRESS NOTES
Pulmonary/Critical Care progress note      PATIENT NAME: Iván Amaral  MRN: [id number]  TODAY'S DATE: 2022  9:20 AM  ADMIT DATE: 2022  AGE: 37 y.o. : 1985    HPI:  Patient is a 37-year-old male who was admitted a week ago with TIA symptoms.  Was discharged home when they resolved.  He began feeling worse and called his mother who is an ER nurse who got him to the hospital.  In the hospital he was agitated and combative in intubated.  An LP was done which reveals 225 wbc's 88% are lymphs 2% polys and 10% monos.  The total protein was 186 and the glucose was 80.     the patient was successfully extubated yesterday.  He appears to be having focal seizures per Neurology.  He may have parainfluenza 3 meningitis.  His mother reports a wet cough and a more aggressive personality then is his usual.    REVIEW OF SYSTEMS  unobtainable    No change in the patient's Past Medical History, Past Surgical History, Social History or Family History since admission.      VITAL SIGNS (MOST RECENT)  Temp: 98.6 °F (37 °C) (22 1101)  Pulse: 71 (22 1530)  Resp: (!) 21 (22 1530)  BP: 111/65 (22 1530)  SpO2: 100 % (22 1530)    INTAKE AND OUTPUT (LAST 24 HOURS):    Intake/Output Summary (Last 24 hours) at 2022 1533  Last data filed at 2022 1200  Gross per 24 hour   Intake 4746.64 ml   Output 1600 ml   Net 3146.64 ml       WEIGHT  Wt Readings from Last 1 Encounters:   22 99.3 kg (218 lb 14.7 oz)       PHYSICAL EXAM  GENERAL: Younger patient in no distress.  HEENT: Pupils equal and reactive. Extraocular movements intact. Nose intact. Pharynx moist.  NECK: Supple.   HEART: Regular rate and rhythm. No murmur or gallop auscultated.  LUNGS: Clear to auscultation and percussion. Lung excursion symmetrical. No change in fremitus. No adventitial noises.  ABDOMEN: Bowel sounds present. Non-tender, no masses palpated.  : Normal anatomy.  EXTREMITIES: Normal muscle tone and  joint movement, no cyanosis or clubbing.   LYMPHATICS: No adenopathy palpated, no edema.  SKIN: Dry, intact, no lesions.   NEURO:  Just given 2 mg of Ativan so not doing much  PSYCH:  Sedated      CBC LAST (LAST 24 HOURS)  Recent Labs   Lab 04/26/22  0501   WBC 8.89   RBC 4.04*   HGB 12.0*   HCT 36.1*   MCV 89   MCH 29.7   MCHC 33.2   RDW 12.5      MPV 9.7   GRAN 68.5  6.1   LYMPH 22.8  2.0   MONO 7.0  0.6   BASO 0.04   NRBC 0       CHEMISTRY LAST (LAST 24 HOURS)  Recent Labs   Lab 04/26/22  0501   *   K 3.8      CO2 27   ANIONGAP 2*   BUN 13   CREATININE 1.1   *   CALCIUM 7.9*   MG 2.3   ALBUMIN 3.5   PROT 6.4   ALKPHOS 104   ALT 24   AST 39   BILITOT 0.8         CARDIAC PROFILE (LAST 24 HOURS)  Recent Labs   Lab 04/24/22  1911 04/24/22  2216   BNP 76  --    CPK  --  592*  592*   TROPONINI <0.030 <0.030       LAST 7 DAYS MICROBIOLOGY   Microbiology Results (last 7 days)     Procedure Component Value Units Date/Time    Culture, Respiratory with Gram Stain [[id number]] Collected: 04/25/22 0000    Order Status: Completed Specimen: Respiratory from Endotracheal Aspirate Updated: 04/26/22 1409     Respiratory Culture Normal respiratory gunjan     Gram Stain (Respiratory) <10 epithelial cells per low power field.     Gram Stain (Respiratory) Many WBC's     Gram Stain (Respiratory) Many Gram positive cocci     Gram Stain (Respiratory) Few Gram negative rods    CSF culture and Gram Stain (Tube 2) [[id number]] Collected: 04/24/22 2200    Order Status: Completed Specimen: CSF (Spinal Fluid) from CSF Tap, Tube 2 Updated: 04/26/22 1402     CSF CULTURE No Growth to date     Gram Stain Result Few WBC's      No organisms seen    Narrative:      On which sequentially labeled tube should this analysis be  performed?->2    Urine Culture High Risk [[id number]] Collected: 04/25/22 0143    Order Status: Completed Specimen: Urine, Catheterized Updated: 04/26/22 0712     Urine Culture, Routine No growth to date     Narrative:      Indicated criteria for high risk culture:->Other  Other (specify):->HIGH RISK    Blood culture [[id number]] Collected: 04/24/22 1920    Order Status: Completed Specimen: Blood from Peripheral, Antecubital, Right Updated: 04/25/22 2032     Blood Culture, Routine No Growth to date      No Growth to date    Blood culture [[id number]] Collected: 04/24/22 1925    Order Status: Completed Specimen: Blood from Peripheral, Antecubital, Right Updated: 04/25/22 2032     Blood Culture, Routine No Growth to date      No Growth to date    Respiratory Infection Panel (PCR), Nasopharyngeal [[id number]]  (Abnormal) Collected: 04/25/22 0347    Order Status: Completed Updated: 04/25/22 1505     Respiratory Infection Panel Source NP swab     Adenovirus Not Detected     Coronavirus 229E, Common Cold Virus Not Detected     Coronavirus HKU1, Common Cold Virus Not Detected     Coronavirus NL63, Common Cold Virus Not Detected     Coronavirus OC43, Common Cold Virus Not Detected     Comment: The Coronavirus strains detected in this test cause the common cold.  These strains are not the COVID-19 (novel Coronavirus)strain   associated with the respiratory disease outbreak.          SARS-CoV2 (COVID-19) Qualitative PCR Not Detected     Human Metapneumovirus Not Detected     Human Rhinovirus/Enterovirus Not Detected     Influenza A (subtypes H1, H1-2009,H3) Not Detected     Influenza B Not Detected     Parainfluenza Virus 1 Not Detected     Parainfluenza Virus 2 Not Detected     Parainfluenza Virus 3 Detected     Parainfluenza Virus 4 Not Detected     Respiratory Syncytial Virus Not Detected     Bordetella Parapertussis (DA8261) Not Detected     Bordetella pertussis (ptxP) Not Detected     Chlamydia pneumoniae Not Detected     Mycoplasma pneumoniae Not Detected     Comment: Respiratory Infection Panel testing performed by Multiplex PCR.       Narrative:      Specimen Source->Nasopharyngeal Swab    Fungus culture [[id number]]  Collected: 04/24/22 2200    Order Status: No result Specimen: CSF (Spinal Fluid) Updated: 04/25/22 0814    Gladys Ink (CSF) [[id number]] Collected: 04/24/22 2200    Order Status: Completed Specimen: CSF (Spinal Fluid) Updated: 04/25/22 0805     Gladys Ink No encapsulated yeast seen    Fungus culture [[id number]]     Order Status: No result Specimen: CSF (Spinal Fluid) from CSF Tap, Tube 1     Cryptococcal antigen [[id number]]     Order Status: Completed Specimen: Blood, Venous     Gladys Ink (CSF) [[id number]]     Order Status: Completed Specimen: CSF (Spinal Fluid) from CSF Tap, Tube 1     MRSA Screen by PCR [[id number]] Collected: 04/25/22 0347    Order Status: Completed Specimen: Nasopharyngeal Swab from Nasal Updated: 04/25/22 0559     MRSA SCREEN BY PCR Negative    Group A Strep, Molecular [[id number]] Collected: 04/25/22 0347    Order Status: Completed Specimen: Throat Updated: 04/25/22 0459     Group A Strep, Molecular Negative     Comment: Arcanobacterium haemolyticum and Beta Streptococcus group C   and G will not be detected by this test method.  Please order   Throat Culture ([id number]) if suspected.         Resp Viral Panel PCR, Peds Under 7 Months Nasopharyngeal Swab [[id number]] Collected: 04/25/22 0347    Order Status: Canceled           MOST RECENT IMAGING  X-Ray Chest AP Portable  Narrative: EXAMINATION:  XR CHEST AP PORTABLE    CLINICAL HISTORY:  ventilator;  seizure    FINDINGS:  Portable chest at 447 compared with 04/25/2022 shows removal of endotracheal tube and enteric catheter.  Right PICC unchanged with tip near SVC.    Cardiomediastinal silhouette is otherwise normal.    Bilateral perihilar alveolar opacities show improving aeration.  Linear opacities persist in bilateral lung bases suggesting atelectasis or patchy consolidation.  No pleural effusion or pneumothorax.  Lung volumes remain low.  Pulmonary vasculature remains normal.  No acute osseous abnormality.  Impression: Removal of endotracheal tube  and enteric catheter with slight improving aeration of bilateral pulmonary opacities.    Electronically signed by: Tru Morales MD  Date:    04/26/2022  Time:    06:18      CURRENT VISIT EKG  Results for orders placed or performed during the hospital encounter of 04/24/22   EKG 12-lead    Narrative    Test Reason : R07.9,    Vent. Rate : 124 BPM     Atrial Rate : 124 BPM     P-R Int : 150 ms          QRS Dur : 102 ms      QT Int : 324 ms       P-R-T Axes : 034 024 073 degrees     QTc Int : 465 ms    Sinus tachycardia  Otherwise normal ECG  When compared with ECG of 18-APR-2022 16:07,  Vent. rate has increased BY  45 BPM    Referred By: AMI   SELF           Confirmed By:        ECHOCARDIOGRAM RESULTS  Results for orders placed during the hospital encounter of 04/18/22    Echo Saline Bubble? Yes    Interpretation Summary  · The left ventricle is normal in size with concentric remodeling and normal systolic function.  · The estimated ejection fraction is 65%.  · Normal left ventricular diastolic function.  · There is no evidence of intracardiac shunting. Bubble study negative for PFO  · Atrial fibrillation not observed.  · Normal right ventricular size with normal right ventricular systolic function.  · Normal central venous pressure (3 mmHg).  · The estimated PA systolic pressure is 11 mmHg.        VENTILATOR INFORMATION  Vent Mode: Spont  Oxygen Concentration (%):  [28-30] 28  Resp Rate Total:  [9.1 br/min-20 br/min] 9.1 br/min  PEEP/CPAP:  [5 cmH20] 5 cmH20  Pressure Support:  [10 cmH20] 10 cmH20  Mean Airway Pressure:  [8.1 cmH20-10 cmH20] 8.1 cmH20       LAST ARTERIAL BLOOD GAS  ABG  Recent Labs   Lab 04/25/22  0045   PH 7.385   PO2 114*   PCO2 43.0   HCO3 25.8   BE 1       IMPRESSION AND PLAN  Meningoencephalitis  Focal seizures  GI bleeding, not apparently ongoing  Hemoptysis, not clearing any more blood  Leukocytosis, resolved  Hyperglycemia, improved  Hyponatremia,     Continue antibiotics and  antivirals  Protonix daily times 30 days per GI  Out of bed  Discontinue IV fluids  Discontinue Precedex  Patient could likely step-down from the ICU  Please call if I can be of assistance    Kathy Jeffery MD  [facility]  Department of Pulmonology  Date of Service: 04/26/2022  9:20 AM

## 2022-04-26 NOTE — PROGRESS NOTES
[facility]  Neurology Progress Note    Patient Name: Iván Amaral  MRN: [id number]  : 1985  TODAY'S DATE: 2022  ADMIT DATE: 2022  5:57 PM                                          CONSULTED PROVIDER: Flor Navarro MD, Neurologist. Cellphone: [phone]  CONSULT REQUESTED BY: Dr. Burton    Chief Complaint   Patient presents with    Seizures       HPI per EMR:  Iván Amaral is a 37 y.o. White male   With PMH of gastritis, HLD, gout,  who presents with AMS.     Pt recently discharged from [facility] on 22,  He was evaluated for headaches with R paraesthesia   There was initially concern for SAH on CT head  However MRI Brain was without SAH  Pt was evaluated by neurology during inpt stay  Pt was treated as TIA  All neurological deficits had resolved by discharge  EEG was to be done outpt basis     Pt is currently intubated on MV  Pt's mother at bedside supplements history  Pt had a reoccurrence of headache with paraesthesias on Wed  He called nursing line, was encouraged to follow-up with his PCP  The symptoms self resolved     Pt continued to feel unwell but could not pinpoint why  He was afraid and asked his mother to monitor him  She is an ER nurse in [place] and came to stay with him  She reports episodes of diaphoresis and pallor  Pt would not let her take his temperature  VS were otherwise stable and pt remained at mental status baseline  Pt's mother also notes a lingering dry cough  He had an URI a few weeks ago  Pt's roommate recently had COVID     Pt eventually sent his mother home--she reports she didn't get around the corner  He called her almost immediately with slurred speech  When she returned to him, he was on the couch unable to move   He was also experiencing a feeling of impending doom  They returned to ER with EMS  EMS reports a staring episode with L gaze that lasted approx 45 seconds  They report he was anxious, excitable,  diaphoretic  Began to experience difficulty swallowing + aphasia     Pt became increasingly agitated and combative in the ER  HR 150s  BP 220s/110s  Pt was sedated, and LP done in ER  Pt currently intubated on MV  Beforehand he told ER physician he took 2 prozac  His mother reports she isn't sure if this is true  Pt no longer takes prozac, he takes welbutrin  Pt is also usually very careful with his medication  He always asks her before mixing any medication  He is usually laid back and without anxiety as well  It is unusual for him to be anxious or agitated     Per pt's mother, pt lives in a filthy house with 3 other men  With cats  There is mold and trash everywhere  She reports they are hoarders    Neurology Consult:  Patient was seen and examined by me today.  He is a 37-year-old gentleman with no significant past medical history who presented initially on 04/19/2022 for right sided numbness, with negative MRI brain for bleed or stroke, treated as a TIA and then discharged.  Then again he presented with altered mental status, and possibly has seizure-like event prior to presentation to the emergency room.  He was agitated, combative, hypertensive and tachycardic requiring intubation and sedation.  LP was done in the emergency room with evidence of possible meningitis/encephalitis.  Neurology consulted for assistance with management.    4/26/22:  Patient was seen and examined by me today. He was successfully extubated yesterday without issues. He did exhibit some jerking of the extremities associated to EEG changes concerning for focal seizure, so started on AED. Neuro exam is non focal.    Scheduled Meds:   acyclovir  10 mg/kg (Ideal) Intravenous Q8H    cefTRIAXone (ROCEPHIN) IVPB  2 g Intravenous Q12H    chlorhexidine  15 mL Mouth/Throat BID    mupirocin   Nasal BID    pantoprazole  40 mg Intravenous BID    vancomycin (VANCOCIN) IVPB  1,500 mg Intravenous Q12H     Continuous Infusions:   sodium chloride  0.9% 100 mL/hr at 04/25/22 1335    dexmedetomidine (PRECEDEX) infusion Stopped (04/26/22 0728)    fentanyl Stopped (04/25/22 1702)    NORepinephrine bitartrate-D5W      propofoL Stopped (04/25/22 1638)     PRN Meds:.acetaminophen, albuterol-ipratropium, calcium chloride IVPB, calcium chloride IVPB, calcium chloride IVPB, dextrose 10%, dextrose 10%, dextrose 50%, dextrose 50%, glucagon (human recombinant), glucose, glucose, hydrALAZINE, hydrALAZINE, lorazepam, magnesium oxide, magnesium sulfate IVPB, magnesium sulfate IVPB, magnesium sulfate IVPB, magnesium sulfate IVPB, naloxone, ondansetron, potassium chloride in water, potassium chloride in water, potassium chloride in water, potassium chloride in water, potassium chloride, potassium chloride, potassium chloride, potassium chloride, promethazine (PHENERGAN) IVPB, sodium chloride 0.9%, sodium phosphate IVPB, sodium phosphate IVPB, sodium phosphate IVPB, sodium phosphate IVPB, sodium phosphate IVPB, Pharmacy to dose Vancomycin consult **AND** vancomycin - pharmacy to dose        Physical Exam  Current Vitals:  Vitals:    04/26/22 0830   BP: 97/63   Pulse: 65   Resp: 15   Temp:      Physical Exam:  General: somnolent  HEENT: PERRL, EOMI  CV: RRR  Lungs: CTAB  Abdomen: +BS    Neurological Exam    MENTAL STATUS EXAM:  Level of alertness: somnolent but arousable  Orientation/Awareness: intact to person and place, not time or situation  Language: fluent but slow. Comprehension mildly impaired, naming and repetition seem intact.    CRANIAL NERVE EXAM:  II/III: fundoscopic exam deferred, PERRL; visual fields full to threat  III/IV/VI: EOMI w/out evidence of nystagmus, strabismus, or evoked diplopia  V: no deficits appreciated to pinprick, temp, vibration; masseter strength intact bilaterally  VII: no facial asymmetry noted  VIII: no deficits in hearing bilaterally  IX/X: palate @ ML and raises symmetrically  XI: shoulder shrug 5/5 bilaterally; head turn 5/5  bilaterally  XII: tongue to midline w/out asymmetry  No dysarthria noted on exam.    MOTOR EXAM:  Bulk and Tone: normal throughout  Patient is at least antigravity in all 4 extremities without asymmetry    REFLEXES:  Masseter (CN V) Not Tested  3+ in bilateral upper and lower extremities, no Russell's, no clonus. Downgoing plantars.      SENSORY EXAM  Withdraws to pain equally in all extremities.    COORDINATION/CEREBELLAR EXAM:  Deferred due to AMS    GAIT:  Deferred for safety.        Laboratory Data & Studies    Recent Labs   Lab 04/24/22 1911 04/25/22 0343 04/26/22  0501   WBC 17.27* 13.88* 8.89   HGB 16.7 15.4 12.0*   * 373 284   MCV 85 86 89       Recent Labs   Lab 04/25/22 0009 04/25/22 0343 04/26/22  0501    138 132*   K 3.8 4.1 3.8    103 103   CO2 23 24 27   BUN 15 13 13   * 123* 166*   CALCIUM 9.3 9.1 7.9*   MG  --  2.5 2.3   PHOS  --  3.6 4.1       Recent Labs   Lab 04/24/22 1911 04/25/22 0343 04/26/22  0501   PROT 8.9* 7.5 6.4   ALBUMIN 5.0 4.4 3.5   BILITOT 0.6 0.5 0.8   AST 29 31 39   ALT 27 25 24   ALKPHOS 158* 132 104       Recent Labs   Lab 04/25/22 0343   LABPT 13.6   INR 1.1   APTT 25.2       Recent Labs   Lab 04/25/22 0009 04/25/22 0343   HGBA1C 5.2  --    TRIG  --  257*       Imaging:  CT Head Without Contrast    Result Date: 4/24/2022  Head CT scan without contrast COMPARISONS: CTA head and neck dated April 18, 2022 ADDITIONAL PERTINENT HISTORY: New onset seizure TECHNIQUE:  Multiple axial images were obtained from the skull base to the vertex without IV contrast. One of the following dose optimization techniques was utilized in the performance of this exam: Automated exposure control; adjustment of the mA and/or kV according to the patient's size; or use of an iterative  reconstruction technique.  Specific details can be referenced in the facility's radiology CT exam operational policy. FINDINGS: Midline shift: Negative Ventricles:  Negative Brain  parenchyma:  Negative Extra-axial spaces:  Negative Intracranial vasculature:  Negative Osseous structures:  Negative Paranasal sinuses and mastoid air cells:  'S marked mucosal thickening involving both maxillary sinuses as well as the sphenoid sinuses and ethmoid air cells. Surrounding soft tissues and orbits:  Negative IMPRESSION: 1. No acute intracranial pathology. 2. Underlying paranasal sinus disease. Electronically signed by:  Tacho Shultz MD  4/24/2022 8:15 PM CDT Workstation: [id number]    CT Head Without Contrast    Result Date: 4/18/2022  CMS MANDATED QUALITY DATA - CT RADIATION 436 All CT scans at this facility utilize dose modulation, iterative reconstruction, and/or weight based dosing when appropriate to reduce radiation dose to as low as reasonably achievable. CLINICAL HISTORY: 37 years (1985) Male Right-sided numbness TECHNIQUE: CT HEAD WITHOUT IV CONTRAST. 121 images obtained. Axial CT of the brain without contrast using soft tissue and bone algorithm. Please note in the acute setting if there is a clinical concern for an acute stroke MRI would be more sensitive/specific for evaluation of ischemia. COMPARISON: None available. FINDINGS: Small linear hyperattenuating blush within a sulcus of the left temporal lobe (image 29), possibly an irregular vessel but suspicious for a tiny subarachnoid bleed. There is no hydrocephalus, herniation or midline shift and the basal/suprasellar cisterns are patent. No acute osseous abnormality is identified. The ventricles and sulci are normal. There is normal gray white differentiation.  Orbital contents appear within normal limits. There is soft tissue attenuation within the left external auditory canal most consistent with cerumen (consider correlation with direct visualization). The visualized paranasal sinuses show mucosal thickening in the right greater than left maxillary sinus, mid ethmoid air cells, anterior sphenoid and frontal ethmoid recesses. There  is heterogeneous foamy debris inferiorly on the right. The visualized mastoid air cells are clear. IMPRESSION: 1. Findings suspicious for a tiny subarachnoid bleed in a sulcus of the left temporal lobe. Consider correlation with either short-term follow-up or a contrast-enhanced MRI of the brain. 2. No hydrocephalus, herniation or midline shift. 3. Mucoperiosteal paranasal sinus disease most pronounced in the right maxillary sinus, consider correlation for acute sinusitis. RESULT NOTIFICATION: These observations were discussed by the dictating physician, by phone and acknowledged by the ordering provider BRI RODRIGUEZ JR at 4/18/2022 3:50 PM CDT . Electronically signed by:  Iván Anthony MD  4/18/2022 3:54 PM CDT Workstation: [id number]    MRI Brain W WO Contrast    Result Date: 4/18/2022  MRI of the brain with and without contrast Clinical history is numbness, TIA questionable small subarachnoid hemorrhage seen on CT brain 1534 hours 9.5 mL Gadavist IV contrast The ventricles and sulci are normal in size and configuration for age. There is no hemorrhage, mass or midline shift. There are no extra-axial fluid collections. There is no evidence of subarachnoid hemorrhage in the left temporal region. There is normal signal within the white matter on all sequences. There is no abnormality on the diffusion-weighted images to suggest acute infarct. . The cerebellum and brainstem are normal. There is no pathologic enhancement. There are flow voids within the cavernous portions of the internal carotid arteries and basilar artery indicating patency. The corpus callosum, cerebellar tonsils, midline structures and orbits are normal. The paranasal sinuses and mastoid air cells are clear. IMPRESSION: Negative MRI of the brain No evidence of subarachnoid hemorrhage within the left temporal region No pathologic enhancement. Electronically signed by:  Clemencia Reyes MD  4/18/2022 6:15 PM CDT Workstation:  [id number]    X-Ray Chest AP Portable    Result Date: 4/24/2022  HISTORY: Chest Pain FINDINGS: Portable chest radiograph at 1921 hours compared to 04/18/2020 shows interval insertion of a nasogastric tube, with the distal tip overlying the proximal gastric body, and the proximal port near the expected level of the gastroesophageal junction. The cardiomediastinal silhouette and pulmonary vasculature are stable and within normal limits. The lungs are hypoexpanded, with parahilar bandlike and irregular opacities suggesting atelectasis. No consolidation, large pleural effusion, evidence of pulmonary edema, or pneumothorax. No acute fractures or destructive osseous lesions. IMPRESSION: 1. Nasogastric tube as described. 2. Pulmonary hypoinflation, with perihilar opacities suggesting atelectasis. Electronically signed by:  Seamus Mercado MD  4/24/2022 7:46 PM CDT Workstation: [id number]    X-Ray Chest AP Portable    Result Date: 4/18/2022  XR CHEST 1 VIEW CLINICAL HISTORY: 37 years Male TIA COMPARISON: None FINDINGS: Cardiomediastinal silhouette is within normal limits. Lungs are normally expanded with no airspace consolidation. No pleural effusion or pneumothorax. No acute osseous abnormality. IMPRESSION: No acute pulmonary process. Electronically signed by:  Clemencia Reyes MD  4/18/2022 3:08 PM CDT Workstation: [id number]    Echo Saline Bubble? Yes    Result Date: 4/19/2022  · The left ventricle is normal in size with concentric remodeling and normal systolic function. · The estimated ejection fraction is 65%. · Normal left ventricular diastolic function. · There is no evidence of intracardiac shunting. Bubble study negative for PFO · Atrial fibrillation not observed. · Normal right ventricular size with normal right ventricular systolic function. · Normal central venous pressure (3 mmHg). · The estimated PA systolic pressure is 11 mmHg.      CTA Head and Neck (xpd)    Result Date: 4/18/2022  EXAMINATION: CTA HEAD  AND NECK (XPD) CLINICAL INDICATION: Male, 37 years old. TIA, right-sided numbness TECHNIQUE: Axial CT angiogram of the head and neck was performed with contrast. Multiplanar reformations as well as 3-D volume rendered imaging were reviewed at the workstation. Degree of stenosis was measured utilizing the NASCET method. CONTRAST: 100 mL Omnipaque 350 mL of IV. COMPARISON: None FINDINGS: CTA brain: The distal vertebral arteries, basilar artery and cavernous portion of the internal carotid arteries are patent. The anterior cerebral arteries, middle cerebral arteries and posterior cerebral arteries are widely patent without evidence of large vessel occlusion, stenosis, AVM or aneurysm. Aortic Arch and Great Vessel Origins: Three-vessel aortic arch. The visualized portion of the great vessels are widely patent. Subclavian Arteries: Widely patent. Right Common Carotid Artery: Widely patent. Right Internal Carotid Artery: Widely patent. Right External Carotid Artery: Widely patent. Left Common Carotid Artery: Widely patent. Left Internal Carotid Artery: Widely patent. Left External Carotid Artery: Widely patent. Right Vertebral Artery: Widely patent. Left Vertebral Artery: Widely patent. Dural Venous Sinuses: No evidence of thrombosis or occlusion. There is mucosal thickening in the right maxillary sinus. Neck Soft Tissues: Paraspinous soft tissues are normal. There are no acute osseous abnormalities. The visualized portion of the lung apices are clear. IMPRESSION: Negative CTA of the head and neck. This exam was performed according to our departmental dose-optimization program which includes automated exposure control, adjustment of the mA and/or kV according to patient size and/or use of iterative reconstruction technique. 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 He was agitated, combative, hypertensive and tachycardic requiring intubation and sedation.  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  Most likely HSV, but need to rule out autoimmune since mother reporting behavioral changes for several weeks preceding the event that led to hospital admission.  Neurology consulted for assistance with management.  - admitted to the ICU,on telemetry monitoring and q.1 hour neuro checks  - LP was performed, consistent with viral or autoimmune meningitis/encephalitis:  Elevated white blood count with lymphocytic predominance, moderately elevated protein  - HSV 1 and 2 PCR was ordered in the CSF  - Ordered autoimmune encephalitis panel, needs to be sent from sample obtained from LP (will contact lab tomorrow)  - also ordered arboviral panel including WNV  - CSF cultures as well  - wean off sedation and ventilation as tolerated  - continue empiric antibiotic and antiviral treatment with ceftriaxone, vancomycin and acyclovir until results of cultures and serologies come back  - EEG showing intermittent frontal rhythmic delta with clinical correlation concerning for focal seizure. There was no generalization. Patient arousable, able to follow commands  - Ordered loading depacon dose of 1500 mg, then continue with 500 mg Q8H and obtain level after 2nd dose  - will follow-up results of above tests and provide further recommendations      · DVT prophylaxis with chemo/SCD prophylaxis    Patient to follow up with [facility] at [phone] within 3 days from discharge.     Stroke education was provided including stroke risk factors modification and any acute neurological changes  including weakness, confusion, visual changes to come straight to the ER.     All questions were answered.                              Thank you kindly for including us in the care of this patient. Please do not hesitate to contact us with any questions.       Critical Care:  55 minutes of critical care time has been spent evaluating with the patient. Time includes chart review not limited to diagnostic imaging, labs, and vitals, patient assessment, discussion with family and nursing, current order evaluations, and new order entries.      Flor Navarro MD  Neurology  Cellphone: [phone]

## 2022-04-26 NOTE — PLAN OF CARE
04/25/22 2044   Patient Assessment/Suction   Level of Consciousness (AVPU) alert   Respiratory Effort Unlabored   Expansion/Accessory Muscles/Retractions expansion symmetric   All Lung Fields Breath Sounds clear   Rhythm/Pattern, Respiratory pattern regular;depth regular   Cough Frequency infrequent   Cough Type good;nonproductive   PRE-TX-O2   O2 Device (Oxygen Therapy) nasal cannula   $ Is the patient on Low Flow Oxygen? Yes   Flow (L/min) 2   SpO2 98 %   Pulse Oximetry Type Continuous   $ Pulse Oximetry - Multiple Charge Pulse Oximetry - Multiple   Pulse 71   Resp 19

## 2022-04-27 PROBLEM — Z14.8 ALPHA-1-ANTITRYPSIN DEFICIENCY CARRIER: Status: ACTIVE | Noted: 2022-04-27

## 2022-04-27 LAB
ALBUMIN SERPL BCP-MCNC: 3.4 G/DL (ref 3.5–5.2)
ALP SERPL-CCNC: 107 U/L (ref 55–135)
ALT SERPL W/O P-5'-P-CCNC: 28 U/L (ref 10–44)
ANION GAP SERPL CALC-SCNC: 8 MMOL/L (ref 8–16)
AST SERPL-CCNC: 36 U/L (ref 10–40)
BACTERIA SPEC AEROBE CULT: NORMAL
BASOPHILS # BLD AUTO: 0.04 K/UL (ref 0–0.2)
BASOPHILS NFR BLD: 0.5 % (ref 0–1.9)
BILIRUB SERPL-MCNC: 0.4 MG/DL (ref 0.1–1)
BUN SERPL-MCNC: 12 MG/DL (ref 6–20)
CALCIUM SERPL-MCNC: 8.5 MG/DL (ref 8.7–10.5)
CHLORIDE SERPL-SCNC: 107 MMOL/L (ref 95–110)
CO2 SERPL-SCNC: 28 MMOL/L (ref 23–29)
CREAT SERPL-MCNC: 1.4 MG/DL (ref 0.5–1.4)
DIFFERENTIAL METHOD: ABNORMAL
EOSINOPHIL # BLD AUTO: 0.2 K/UL (ref 0–0.5)
EOSINOPHIL NFR BLD: 2.5 % (ref 0–8)
ERYTHROCYTE [DISTWIDTH] IN BLOOD BY AUTOMATED COUNT: 12.3 % (ref 11.5–14.5)
EST. GFR  (AFRICAN AMERICAN): >60 ML/MIN/1.73 M^2
EST. GFR  (NON AFRICAN AMERICAN): >60 ML/MIN/1.73 M^2
GLUCOSE SERPL-MCNC: 89 MG/DL (ref 70–110)
GRAM STN SPEC: NORMAL
HCT VFR BLD AUTO: 34.8 % (ref 40–54)
HGB BLD-MCNC: 12 G/DL (ref 14–18)
IMM GRANULOCYTES # BLD AUTO: 0.04 K/UL (ref 0–0.04)
IMM GRANULOCYTES NFR BLD AUTO: 0.5 % (ref 0–0.5)
LYMPHOCYTES # BLD AUTO: 2.2 K/UL (ref 1–4.8)
LYMPHOCYTES NFR BLD: 24.7 % (ref 18–48)
MCH RBC QN AUTO: 29.3 PG (ref 27–31)
MCHC RBC AUTO-ENTMCNC: 34.5 G/DL (ref 32–36)
MCV RBC AUTO: 85 FL (ref 82–98)
MONOCYTES # BLD AUTO: 0.9 K/UL (ref 0.3–1)
MONOCYTES NFR BLD: 10.3 % (ref 4–15)
NEUTROPHILS # BLD AUTO: 5.4 K/UL (ref 1.8–7.7)
NEUTROPHILS NFR BLD: 61.5 % (ref 38–73)
NRBC BLD-RTO: 0 /100 WBC
PLATELET # BLD AUTO: 272 K/UL (ref 150–450)
PMV BLD AUTO: 9.8 FL (ref 9.2–12.9)
POTASSIUM SERPL-SCNC: 3.6 MMOL/L (ref 3.5–5.1)
PROT SERPL-MCNC: 6.4 G/DL (ref 6–8.4)
RBC # BLD AUTO: 4.1 M/UL (ref 4.6–6.2)
SODIUM SERPL-SCNC: 143 MMOL/L (ref 136–145)
VALPROATE SERPL-MCNC: 25.4 UG/ML (ref 50–100)
WBC # BLD AUTO: 8.71 K/UL (ref 3.9–12.7)

## 2022-04-27 PROCEDURE — 99900035 HC TECH TIME PER 15 MIN (STAT)

## 2022-04-27 PROCEDURE — 20000000 HC ICU ROOM

## 2022-04-27 PROCEDURE — 63600175 PHARM REV CODE 636 W HCPCS: Performed by: INTERNAL MEDICINE

## 2022-04-27 PROCEDURE — 27000221 HC OXYGEN, UP TO 24 HOURS

## 2022-04-27 PROCEDURE — 25000003 PHARM REV CODE 250: Performed by: INTERNAL MEDICINE

## 2022-04-27 PROCEDURE — 97116 GAIT TRAINING THERAPY: CPT

## 2022-04-27 PROCEDURE — 25000003 PHARM REV CODE 250: Performed by: FAMILY MEDICINE

## 2022-04-27 PROCEDURE — 80053 COMPREHEN METABOLIC PANEL: CPT | Performed by: FAMILY MEDICINE

## 2022-04-27 PROCEDURE — 99233 PR SUBSEQUENT HOSPITAL CARE,LEVL III: ICD-10-PCS | Mod: ,,, | Performed by: INTERNAL MEDICINE

## 2022-04-27 PROCEDURE — 85025 COMPLETE CBC W/AUTO DIFF WBC: CPT | Performed by: FAMILY MEDICINE

## 2022-04-27 PROCEDURE — 95819 EEG AWAKE AND ASLEEP: CPT

## 2022-04-27 PROCEDURE — 97165 OT EVAL LOW COMPLEX 30 MIN: CPT

## 2022-04-27 PROCEDURE — C9113 INJ PANTOPRAZOLE SODIUM, VIA: HCPCS | Performed by: INTERNAL MEDICINE

## 2022-04-27 PROCEDURE — 80164 ASSAY DIPROPYLACETIC ACD TOT: CPT | Performed by: STUDENT IN AN ORGANIZED HEALTH CARE EDUCATION/TRAINING PROGRAM

## 2022-04-27 PROCEDURE — 99233 SBSQ HOSP IP/OBS HIGH 50: CPT | Mod: ,,, | Performed by: INTERNAL MEDICINE

## 2022-04-27 PROCEDURE — 97535 SELF CARE MNGMENT TRAINING: CPT

## 2022-04-27 PROCEDURE — 94761 N-INVAS EAR/PLS OXIMETRY MLT: CPT

## 2022-04-27 PROCEDURE — 94799 UNLISTED PULMONARY SVC/PX: CPT

## 2022-04-27 PROCEDURE — 63600175 PHARM REV CODE 636 W HCPCS: Performed by: FAMILY MEDICINE

## 2022-04-27 PROCEDURE — 97161 PT EVAL LOW COMPLEX 20 MIN: CPT

## 2022-04-27 PROCEDURE — 25000003 PHARM REV CODE 250: Performed by: STUDENT IN AN ORGANIZED HEALTH CARE EDUCATION/TRAINING PROGRAM

## 2022-04-27 RX ORDER — ONDANSETRON 2 MG/ML
4 INJECTION INTRAMUSCULAR; INTRAVENOUS EVERY 6 HOURS PRN
Status: DISCONTINUED | OUTPATIENT
Start: 2022-04-27 | End: 2022-04-27

## 2022-04-27 RX ORDER — ACETAMINOPHEN 10 MG/ML
1000 INJECTION, SOLUTION INTRAVENOUS EVERY 6 HOURS
Status: COMPLETED | OUTPATIENT
Start: 2022-04-27 | End: 2022-04-28

## 2022-04-27 RX ADMIN — PANTOPRAZOLE SODIUM 40 MG: 40 INJECTION, POWDER, LYOPHILIZED, FOR SOLUTION INTRAVENOUS at 09:04

## 2022-04-27 RX ADMIN — MUPIROCIN: 20 OINTMENT TOPICAL at 09:04

## 2022-04-27 RX ADMIN — ONDANSETRON 4 MG: 2 INJECTION INTRAMUSCULAR; INTRAVENOUS at 11:04

## 2022-04-27 RX ADMIN — ACYCLOVIR SODIUM 680 MG: 500 INJECTION, SOLUTION INTRAVENOUS at 03:04

## 2022-04-27 RX ADMIN — CHLORHEXIDINE GLUCONATE 15 ML: 1.2 RINSE ORAL at 09:04

## 2022-04-27 RX ADMIN — ACETAMINOPHEN 1000 MG: 10 INJECTION INTRAVENOUS at 09:04

## 2022-04-27 RX ADMIN — DIVALPROEX SODIUM 500 MG: 250 TABLET, DELAYED RELEASE ORAL at 09:04

## 2022-04-27 RX ADMIN — ACYCLOVIR SODIUM 680 MG: 500 INJECTION, SOLUTION INTRAVENOUS at 12:04

## 2022-04-27 RX ADMIN — DIVALPROEX SODIUM 500 MG: 250 TABLET, DELAYED RELEASE ORAL at 03:04

## 2022-04-27 RX ADMIN — ACYCLOVIR SODIUM 680 MG: 500 INJECTION, SOLUTION INTRAVENOUS at 08:04

## 2022-04-27 RX ADMIN — ACETAMINOPHEN 650 MG: 325 TABLET ORAL at 02:04

## 2022-04-27 RX ADMIN — DIVALPROEX SODIUM 500 MG: 250 TABLET, DELAYED RELEASE ORAL at 05:04

## 2022-04-27 RX ADMIN — POTASSIUM CHLORIDE 40 MEQ: 1500 TABLET, EXTENDED RELEASE ORAL at 08:04

## 2022-04-27 RX ADMIN — CEFTRIAXONE 2 G: 2 INJECTION, SOLUTION INTRAVENOUS at 09:04

## 2022-04-27 RX ADMIN — ACETAMINOPHEN 650 MG: 325 TABLET ORAL at 04:04

## 2022-04-27 NOTE — PROGRESS NOTES
[facility] Medicine  Progress Note    Patient Name: Iván Amaral  MRN: [id number]  Patient Class: IP- Inpatient   Admission Date: 4/24/2022  Length of Stay: 3 days  Attending Physician: Jakob Cadena MD  Primary Care Provider: Hugo Moran MD        Subjective:     Principal Problem:Meningitis        HPI:  No notes on file    Overview/Hospital Course:  4/26/2022  Mr Corral has had right sided rhythmic movements with sleep. His throat is sore post extubation    4/27/2022  I am OK with no complaints save fatigue and weakness. No seizure like activity per mother. Familial A 1 antitrypsin disease with variable penetrance      Interval History: PT and OT ordered. No seizure like activity    Review of Systems   Constitutional:  Positive for diaphoresis and fatigue.   HENT: Negative.     Eyes: Negative.    Respiratory:          ZAVALETA   Cardiovascular: Negative.    Gastrointestinal: Negative.    Endocrine: Positive for heat intolerance.   Genitourinary: Negative.    Musculoskeletal:  Positive for arthralgias, gait problem and myalgias.   Skin: Negative.    Allergic/Immunologic: Negative.    Neurological:  Positive for weakness.   Hematological: Negative.    Psychiatric/Behavioral:  Positive for dysphoric mood. The patient is nervous/anxious.         Resolving   Objective:     Vital Signs (Most Recent):  Temp: 98.1 °F (36.7 °C) (04/27/22 1100)  Pulse: 90 (04/27/22 1600)  Resp: 18 (04/27/22 1600)  BP: 124/79 (04/27/22 1200)  SpO2: (!) 94 % (04/27/22 1600)   Vital Signs (24h Range):  Temp:  [98 °F (36.7 °C)-98.4 °F (36.9 °C)] 98.1 °F (36.7 °C)  Pulse:  [64-96] 90  Resp:  [14-27] 18  SpO2:  [93 %-100 %] 94 %  BP: (109-132)/(56-87) 124/79     Weight: 99.3 kg (218 lb 14.7 oz)  Body mass index is 28.11 kg/m².    Intake/Output Summary (Last 24 hours) at 4/27/2022 1640  Last data filed at 4/27/2022 1600  Gross per 24 hour   Intake 1770 ml   Output 1400 ml   Net 370 ml      Physical Exam  Vitals and  nursing note reviewed.   Constitutional:       General: He is not in acute distress.  HENT:      Head: Normocephalic and atraumatic.      Nose: Nose normal.      Mouth/Throat:      Mouth: Mucous membranes are moist.   Eyes:      Extraocular Movements: Extraocular movements intact.      Pupils: Pupils are equal, round, and reactive to light.   Cardiovascular:      Rate and Rhythm: Normal rate and regular rhythm.   Pulmonary:      Effort: Pulmonary effort is normal.      Breath sounds: Normal breath sounds.   Abdominal:      General: Bowel sounds are normal. There is no distension.      Tenderness: There is no abdominal tenderness. There is no guarding or rebound.   Musculoskeletal:      Cervical back: Normal range of motion and neck supple.      Comments: Intermittent extremity weakness right resolving   Skin:     General: Skin is warm.   Neurological:      Mental Status: He is alert and oriented to person, place, and time.   Psychiatric:      Comments: Mildly dysphoric mood       Significant Labs: All pertinent labs within the past 24 hours have been reviewed.  Recent Lab Results         04/27/22  0452        Albumin 3.4       Alkaline Phosphatase 107       ALT 28       Anion Gap 8       AST 36       Baso # 0.04       Basophil % 0.5       BILIRUBIN TOTAL 0.4  Comment: For infants and newborns, interpretation of results should be based  on gestational age, weight and in agreement with clinical  observations.    Premature Infant recommended reference ranges:  Up to 24 hours.............<8.0 mg/dL  Up to 48 hours............<12.0 mg/dL  3-5 days..................<15.0 mg/dL  6-29 days.................<15.0 mg/dL         BUN 12       Calcium 8.5       Chloride 107       CO2 28       Creatinine 1.4       Differential Method Automated       eGFR if  >60.0       eGFR if non  >60.0  Comment: Calculation used to obtain the estimated glomerular filtration  rate (eGFR) is the CKD-EPI equation.           Eos # 0.2       Eosinophil % 2.5       Glucose 89       Gran # (ANC) 5.4       Gran % 61.5       Hematocrit 34.8       Hemoglobin 12.0       Immature Grans (Abs) 0.04  Comment: Mild elevation in immature granulocytes is non specific and   can be seen in a variety of conditions including stress response,   acute inflammation, trauma and pregnancy. Correlation with other   laboratory and clinical findings is essential.         Immature Granulocytes 0.5       Lymph # 2.2       Lymph % 24.7       MCH 29.3       MCHC 34.5       MCV 85       Mono # 0.9       Mono % 10.3       MPV 9.8       nRBC 0       Platelets 272       Potassium 3.6       PROTEIN TOTAL 6.4       RBC 4.10       RDW 12.3       Sodium 143  Comment: Delta check review       Valproic Acid Lvl 25.4  Comment: Valproic Acid (ug/ml)  Toxic:   >100.0 ug/ml         WBC 8.71               Significant Imaging: I have reviewed all pertinent imaging results/findings within the past 24 hours.      Assessment/Plan:      Meningococcal encephalitis  Under treatment  Case discussed with neurology    Reduced dysphoria and weakness. Much more alert and responsive      Seizures  None noted this date on exam  Neurology feels that the patient should remain in ICU for 1 more day        VTE Risk Mitigation (From admission, onward)         Ordered     IP VTE HIGH RISK PATIENT  Once         04/24/22 2316     Place sequential compression device  Until discontinued         04/24/22 2316                Discharge Planning   STANISLAW:      Code Status: Full Code   Is the patient medically ready for discharge?:     Reason for patient still in hospital (select all that apply): Patient new problem, Treatment and Consult recommendations  Discharge Plan A: Rehab   Discharge Delays: None known at this time              Jakob Cadena MD  Department of Hospital Medicine   [facility]

## 2022-04-27 NOTE — ASSESSMENT & PLAN NOTE
None noted this date on exam  Neurology feels that the patient should remain in ICU for 1 more day

## 2022-04-27 NOTE — CARE UPDATE
04/27/22 0949   Patient Assessment/Suction   Respiratory Effort Unlabored   All Lung Fields Breath Sounds equal bilaterally;clear   PRE-TX-O2   O2 Device (Oxygen Therapy) nasal cannula   $ Is the patient on Low Flow Oxygen? Yes   Flow (L/min) 2   SpO2 97 %   Pulse Oximetry Type Continuous   $ Pulse Oximetry - Multiple Charge Pulse Oximetry - Multiple   Pulse 83   Resp (!) 22   Aerosol Therapy   $ Aerosol Therapy Charges PRN treatment not required   Respiratory Evaluation   $ Care Plan Tech Time 15 min

## 2022-04-27 NOTE — NURSING
"Went to check on patient. Pt and brother disconnected patient from IV infusion and monitor and pt was in bathroom. NADN. Pt came out bathroom and blood noted near PICC line site. Pt had removed the hub when removing the IV infusion. Clamped, aspirated back blood, and flushed PICC after cleaning and replacing with a sterile hub. Educated patient and brother to call. Pt stated," I just saw her disconnect me last night." Instructed patient he does not want a line infection. Pt placed back on monitor and in chair. Resp even and unlabored. GCS 15. Pt mother reported to bedside and updated per patient permission.   "

## 2022-04-27 NOTE — PROGRESS NOTES
[facility]  Neurology Progress Note    Patient Name: Iván Amaral  MRN: [id number]  : 1985  TODAY'S DATE: 2022  ADMIT DATE: 2022  5:57 PM                                          CONSULTED PROVIDER: Flor Navarro MD, Neurologist. Cellphone: [phone]  CONSULT REQUESTED BY: Dr. Burton    Chief Complaint   Patient presents with    Seizures       HPI per EMR:  Iván Amaral is a 37 y.o. White male   With PMH of gastritis, HLD, gout,  who presents with AMS.     Pt recently discharged from [facility] on 22,  He was evaluated for headaches with R paraesthesia   There was initially concern for SAH on CT head  However MRI Brain was without SAH  Pt was evaluated by neurology during inpt stay  Pt was treated as TIA  All neurological deficits had resolved by discharge  EEG was to be done outpt basis     Pt is currently intubated on MV  Pt's mother at bedside supplements history  Pt had a reoccurrence of headache with paraesthesias on Wed  He called nursing line, was encouraged to follow-up with his PCP  The symptoms self resolved     Pt continued to feel unwell but could not pinpoint why  He was afraid and asked his mother to monitor him  She is an ER nurse in [place] and came to stay with him  She reports episodes of diaphoresis and pallor  Pt would not let her take his temperature  VS were otherwise stable and pt remained at mental status baseline  Pt's mother also notes a lingering dry cough  He had an URI a few weeks ago  Pt's roommate recently had COVID     Pt eventually sent his mother home--she reports she didn't get around the corner  He called her almost immediately with slurred speech  When she returned to him, he was on the couch unable to move   He was also experiencing a feeling of impending doom  They returned to ER with EMS  EMS reports a staring episode with L gaze that lasted approx 45 seconds  They report he was anxious, excitable,  diaphoretic  Began to experience difficulty swallowing + aphasia     Pt became increasingly agitated and combative in the ER  HR 150s  BP 220s/110s  Pt was sedated, and LP done in ER  Pt currently intubated on MV  Beforehand he told ER physician he took 2 prozac  His mother reports she isn't sure if this is true  Pt no longer takes prozac, he takes welbutrin  Pt is also usually very careful with his medication  He always asks her before mixing any medication  He is usually laid back and without anxiety as well  It is unusual for him to be anxious or agitated     Per pt's mother, pt lives in a filthy house with 3 other men  With cats  There is mold and trash everywhere  She reports they are hoarders    Neurology Consult:  Patient was seen and examined by me today.  He is a 37-year-old gentleman with no significant past medical history who presented initially on 04/19/2022 for right sided numbness, with negative MRI brain for bleed or stroke, treated as a TIA and then discharged.  Then again he presented with altered mental status, and possibly has seizure-like event prior to presentation to the emergency room.  He was agitated, combative, hypertensive and tachycardic requiring intubation and sedation.  LP was done in the emergency room with evidence of possible meningitis/encephalitis.  Neurology consulted for assistance with management.    4/26/22:  Patient was seen and examined by me today. He was successfully extubated yesterday without issues. He did exhibit some jerking of the extremities associated to EEG changes concerning for focal seizure, so started on AED. Neuro exam is non focal.    4/27/22: Patient was seen and examined by me today. Complains of nausea, but otherwise, he is neurological exam is intact. Exam is non focal. He does report some hallucinations when he closes his eyes, but denies any other deficits.     Scheduled Meds:   acyclovir  10 mg/kg (Ideal) Intravenous Q8H    cefTRIAXone (ROCEPHIN)  IVPB  2 g Intravenous Q24H    chlorhexidine  15 mL Mouth/Throat BID    divalproex  500 mg Oral Q8H    mupirocin   Nasal BID    pantoprazole  40 mg Intravenous Daily     Continuous Infusions:    PRN Meds:.acetaminophen, albuterol-ipratropium, calcium chloride IVPB, calcium chloride IVPB, calcium chloride IVPB, dextrose 10%, dextrose 10%, dextrose 50%, dextrose 50%, glucagon (human recombinant), glucose, glucose, hydrALAZINE, hydrALAZINE, lorazepam, lorazepam, magnesium oxide, magnesium sulfate IVPB, magnesium sulfate IVPB, magnesium sulfate IVPB, magnesium sulfate IVPB, naloxone, ondansetron, potassium chloride in water, potassium chloride in water, potassium chloride in water, potassium chloride in water, potassium chloride, potassium chloride, potassium chloride, potassium chloride, promethazine (PHENERGAN) IVPB, sodium chloride 0.9%, sodium phosphate IVPB, sodium phosphate IVPB, sodium phosphate IVPB, sodium phosphate IVPB, sodium phosphate IVPB        Physical Exam  Current Vitals:  Vitals:    04/27/22 1000   BP: 120/87   Pulse: 94   Resp: 14   Temp:      Physical Exam:  General: somnolent  HEENT: PERRL, EOMI  CV: RRR  Lungs: CTAB  Abdomen: +BS    Neurological Exam    MENTAL STATUS EXAM:  Level of alertness: somnolent but arousable  Orientation/Awareness: intact to person and place, not time or situation  Language: fluent but slow. Comprehension mildly impaired, naming and repetition seem intact.    CRANIAL NERVE EXAM:  II/III: fundoscopic exam deferred, PERRL; visual fields full to threat  III/IV/VI: EOMI w/out evidence of nystagmus, strabismus, or evoked diplopia  V: no deficits appreciated to pinprick, temp, vibration; masseter strength intact bilaterally  VII: no facial asymmetry noted  VIII: no deficits in hearing bilaterally  IX/X: palate @ ML and raises symmetrically  XI: shoulder shrug 5/5 bilaterally; head turn 5/5 bilaterally  XII: tongue to midline w/out asymmetry  No dysarthria noted on  exam.    MOTOR EXAM:  Bulk and Tone: normal throughout  Patient is at least antigravity in all 4 extremities without asymmetry    REFLEXES:  Masseter (CN V) Not Tested  3+ in bilateral upper and lower extremities, no Russell's, no clonus. Downgoing plantars.      SENSORY EXAM  Withdraws to pain equally in all extremities.    COORDINATION/CEREBELLAR EXAM:  Deferred due to AMS    GAIT:  Deferred for safety.        Laboratory Data & Studies    Recent Labs   Lab 04/25/22 0343 04/26/22 0501 04/27/22 0452   WBC 13.88* 8.89 8.71   HGB 15.4 12.0* 12.0*    284 272   MCV 86 89 85       Recent Labs   Lab 04/25/22 0343 04/26/22 0501 04/27/22 0452    132* 143   K 4.1 3.8 3.6    103 107   CO2 24 27 28   BUN 13 13 12   * 166* 89   CALCIUM 9.1 7.9* 8.5*   MG 2.5 2.3  --    PHOS 3.6 4.1  --        Recent Labs   Lab 04/25/22 0343 04/26/22 0501 04/27/22 0452   PROT 7.5 6.4 6.4   ALBUMIN 4.4 3.5 3.4*   BILITOT 0.5 0.8 0.4   AST 31 39 36   ALT 25 24 28   ALKPHOS 132 104 107       Recent Labs   Lab 04/25/22 0343   LABPT 13.6   INR 1.1   APTT 25.2       Recent Labs   Lab 04/25/22  0009 04/25/22 0343   HGBA1C 5.2  --    TRIG  --  257*       Imaging:  CT Head Without Contrast    Result Date: 4/24/2022  Head CT scan without contrast COMPARISONS: CTA head and neck dated April 18, 2022 ADDITIONAL PERTINENT HISTORY: New onset seizure TECHNIQUE:  Multiple axial images were obtained from the skull base to the vertex without IV contrast. One of the following dose optimization techniques was utilized in the performance of this exam: Automated exposure control; adjustment of the mA and/or kV according to the patient's size; or use of an iterative  reconstruction technique.  Specific details can be referenced in the facility's radiology CT exam operational policy. FINDINGS: Midline shift: Negative Ventricles:  Negative Brain parenchyma:  Negative Extra-axial spaces:  Negative Intracranial vasculature:  Negative  Osseous structures:  Negative Paranasal sinuses and mastoid air cells:  'S marked mucosal thickening involving both maxillary sinuses as well as the sphenoid sinuses and ethmoid air cells. Surrounding soft tissues and orbits:  Negative IMPRESSION: 1. No acute intracranial pathology. 2. Underlying paranasal sinus disease. Electronically signed by:  Tacho Shultz MD  4/24/2022 8:15 PM CDT Workstation: [id number]    CT Head Without Contrast    Result Date: 4/18/2022  CMS MANDATED QUALITY DATA - CT RADIATION 436 All CT scans at this facility utilize dose modulation, iterative reconstruction, and/or weight based dosing when appropriate to reduce radiation dose to as low as reasonably achievable. CLINICAL HISTORY: 37 years (1985) Male Right-sided numbness TECHNIQUE: CT HEAD WITHOUT IV CONTRAST. 121 images obtained. Axial CT of the brain without contrast using soft tissue and bone algorithm. Please note in the acute setting if there is a clinical concern for an acute stroke MRI would be more sensitive/specific for evaluation of ischemia. COMPARISON: None available. FINDINGS: Small linear hyperattenuating blush within a sulcus of the left temporal lobe (image 29), possibly an irregular vessel but suspicious for a tiny subarachnoid bleed. There is no hydrocephalus, herniation or midline shift and the basal/suprasellar cisterns are patent. No acute osseous abnormality is identified. The ventricles and sulci are normal. There is normal gray white differentiation.  Orbital contents appear within normal limits. There is soft tissue attenuation within the left external auditory canal most consistent with cerumen (consider correlation with direct visualization). The visualized paranasal sinuses show mucosal thickening in the right greater than left maxillary sinus, mid ethmoid air cells, anterior sphenoid and frontal ethmoid recesses. There is heterogeneous foamy debris inferiorly on the right. The visualized mastoid air cells  are clear. IMPRESSION: 1. Findings suspicious for a tiny subarachnoid bleed in a sulcus of the left temporal lobe. Consider correlation with either short-term follow-up or a contrast-enhanced MRI of the brain. 2. No hydrocephalus, herniation or midline shift. 3. Mucoperiosteal paranasal sinus disease most pronounced in the right maxillary sinus, consider correlation for acute sinusitis. RESULT NOTIFICATION: These observations were discussed by the dictating physician, by phone and acknowledged by the ordering provider BRI RODRIGUEZ JR at 4/18/2022 3:50 PM CDT . Electronically signed by:  Iván Anthony MD  4/18/2022 3:54 PM CDT Workstation: [id number]    MRI Brain W WO Contrast    Result Date: 4/18/2022  MRI of the brain with and without contrast Clinical history is numbness, TIA questionable small subarachnoid hemorrhage seen on CT brain 1534 hours 9.5 mL Gadavist IV contrast The ventricles and sulci are normal in size and configuration for age. There is no hemorrhage, mass or midline shift. There are no extra-axial fluid collections. There is no evidence of subarachnoid hemorrhage in the left temporal region. There is normal signal within the white matter on all sequences. There is no abnormality on the diffusion-weighted images to suggest acute infarct. . The cerebellum and brainstem are normal. There is no pathologic enhancement. There are flow voids within the cavernous portions of the internal carotid arteries and basilar artery indicating patency. The corpus callosum, cerebellar tonsils, midline structures and orbits are normal. The paranasal sinuses and mastoid air cells are clear. IMPRESSION: Negative MRI of the brain No evidence of subarachnoid hemorrhage within the left temporal region No pathologic enhancement. Electronically signed by:  Clemencia Reyes MD  4/18/2022 6:15 PM CDT Workstation: [id number]    X-Ray Chest AP Portable    Result Date: 4/24/2022  HISTORY: Chest Pain FINDINGS: Portable  chest radiograph at 1921 hours compared to 04/18/2020 shows interval insertion of a nasogastric tube, with the distal tip overlying the proximal gastric body, and the proximal port near the expected level of the gastroesophageal junction. The cardiomediastinal silhouette and pulmonary vasculature are stable and within normal limits. The lungs are hypoexpanded, with parahilar bandlike and irregular opacities suggesting atelectasis. No consolidation, large pleural effusion, evidence of pulmonary edema, or pneumothorax. No acute fractures or destructive osseous lesions. IMPRESSION: 1. Nasogastric tube as described. 2. Pulmonary hypoinflation, with perihilar opacities suggesting atelectasis. Electronically signed by:  Seamus Mercado MD  4/24/2022 7:46 PM CDT Workstation: [id number]    X-Ray Chest AP Portable    Result Date: 4/18/2022  XR CHEST 1 VIEW CLINICAL HISTORY: 37 years Male TIA COMPARISON: None FINDINGS: Cardiomediastinal silhouette is within normal limits. Lungs are normally expanded with no airspace consolidation. No pleural effusion or pneumothorax. No acute osseous abnormality. IMPRESSION: No acute pulmonary process. Electronically signed by:  Clemencia Reyes MD  4/18/2022 3:08 PM CDT Workstation: [id number]    Echo Saline Bubble? Yes    Result Date: 4/19/2022  · The left ventricle is normal in size with concentric remodeling and normal systolic function. · The estimated ejection fraction is 65%. · Normal left ventricular diastolic function. · There is no evidence of intracardiac shunting. Bubble study negative for PFO · Atrial fibrillation not observed. · Normal right ventricular size with normal right ventricular systolic function. · Normal central venous pressure (3 mmHg). · The estimated PA systolic pressure is 11 mmHg.      CTA Head and Neck (xpd)    Result Date: 4/18/2022  EXAMINATION: CTA HEAD AND NECK (XPD) CLINICAL INDICATION: Male, 37 years old. TIA, right-sided numbness TECHNIQUE: Axial CT  angiogram of the head and neck was performed with contrast. Multiplanar reformations as well as 3-D volume rendered imaging were reviewed at the workstation. Degree of stenosis was measured utilizing the NASCET method. CONTRAST: 100 mL Omnipaque 350 mL of IV. COMPARISON: None FINDINGS: CTA brain: The distal vertebral arteries, basilar artery and cavernous portion of the internal carotid arteries are patent. The anterior cerebral arteries, middle cerebral arteries and posterior cerebral arteries are widely patent without evidence of large vessel occlusion, stenosis, AVM or aneurysm. Aortic Arch and Great Vessel Origins: Three-vessel aortic arch. The visualized portion of the great vessels are widely patent. Subclavian Arteries: Widely patent. Right Common Carotid Artery: Widely patent. Right Internal Carotid Artery: Widely patent. Right External Carotid Artery: Widely patent. Left Common Carotid Artery: Widely patent. Left Internal Carotid Artery: Widely patent. Left External Carotid Artery: Widely patent. Right Vertebral Artery: Widely patent. Left Vertebral Artery: Widely patent. Dural Venous Sinuses: No evidence of thrombosis or occlusion. There is mucosal thickening in the right maxillary sinus. Neck Soft Tissues: Paraspinous soft tissues are normal. There are no acute osseous abnormalities. The visualized portion of the lung apices are clear. IMPRESSION: Negative CTA of the head and neck. This exam was performed according to our departmental dose-optimization program which includes automated exposure control, adjustment of the mA and/or kV according to patient size and/or use of iterative reconstruction technique. 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 Amaral is a 37-year-old gentleman with no significant past medical history who presented with altered  mental status and seizure-like event. He was agitated, combative, hypertensive and tachycardic requiring intubation and sedation.  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  Most likely HSV, but need to rule out autoimmune since mother reporting behavioral changes for several weeks preceding the event that led to hospital admission.  Neurology consulted for assistance with management.  - admitted to the ICU,on telemetry monitoring and q.1 hour neuro checks  - LP was performed, consistent with viral or autoimmune meningitis/encephalitis:  Elevated white blood count with lymphocytic predominance, moderately elevated protein  - HSV 1 and 2 PCR was ordered in the CSF  - Ordered autoimmune encephalitis panel, needs to be sent from sample obtained from LP (will contact lab tomorrow)  - also ordered arboviral panel including WNV  - CSF cultures as well  - continue empiric antibiotic and antiviral treatment with cephalosporin and acyclovir until results of cultures and serologies come back, Infectious disease on board, appreciate assistance  - EEG showed intermittent frontal rhythmic delta with clinical correlation concerning for focal seizure. There was no generalization. Patient arousable, able to follow commands. Repeat EEG improved, no reported seizure activity  - Continue Valproic acid 500 mg Q8H and obtain repeat level tomorrow  - will follow along      · DVT prophylaxis with chemo/SCD prophylaxis    Patient to follow up with [facility] at [phone] within 3 days from discharge.     Stroke education was provided including stroke risk factors modification and any acute neurological changes including weakness, confusion, visual changes to come straight to the ER.     All questions were answered.                              Thank you kindly for  including us in the care of this patient. Please do not hesitate to contact us with any questions.       Critical Care:  47 minutes of critical care time has been spent evaluating with the patient. Time includes chart review not limited to diagnostic imaging, labs, and vitals, patient assessment, discussion with family and nursing, current order evaluations, and new order entries.      Flor Navarro MD  Neurology  Cellphone: [phone]

## 2022-04-27 NOTE — ASSESSMENT & PLAN NOTE
Under treatment  Case discussed with neurology    Reduced dysphoria and weakness. Much more alert and responsive

## 2022-04-27 NOTE — PLAN OF CARE
Was approached by md stating patient will need IPR, no pt/ot notes available at this time, cm provided Mom with cms list for the [facility] at her request.  She will review for anticipation of need   04/27/22 1102   Discharge Reassessment   Assessment Type Discharge Planning Reassessment   Did the patient's condition or plan change since previous assessment? Yes   Discharge Plan discussed with: Parent(s)  (dr Cadena)   Name(s) and Number(s) Michelle Cabral   Communicated STANISLAW with patient/caregiver Date not available/Unable to determine   Discharge Plan A Rehab   Discharge Plan B Rehab   DME Needed Upon Discharge  none   Discharge Barriers Identified None   Why the patient remains in the hospital Requires continued medical care   Post-Acute Status   Post-Acute Authorization Placement   Post-Acute Placement Status Patient List Provided   Discharge Delays None known at this time

## 2022-04-27 NOTE — PROGRESS NOTES
Consult Note  Infectious Disease    Reason for Consult:  Meningitis    HPI: Iván Amaral is a 37 y.o. male Was recently hospitalized as 4/18 until 4/19 with transient numbness, tingling of the right hand with clumsiness, right leg weakness and right-sided facial weakness.  CT scan of the head done in the emergency room showed a possible small subarachnoid hemorrhage.  He was seen by Neurology and had an MRI suggesting no subarachnoid hemorrhage.  He was placed on lipid-lowering agent, aspirin has for TIA and released.  He was brought back to the emergency room yesterday by EMS because of unresponsive state, staring to the left lasting approximately 45-60 seconds with subsequent difficulty swallowing and with expressive aphasia.  The ER record states that family noted him to be hyperventilating and diaphoretic.  The patient apparently stated that he took 2 Prozac pills and in the emergency room he was tachycardic and hypertensive.  Repeat CT head showed no acute intracranial pathology but underlying paranasal sinus disease.  History and physical states that mother reported recurrence of headache with paresthesias on 04/20 and these resolved as well.  His mother who is a nurse had came to stay with him, and noted a lingering cough and the patient did have an upper respiratory infection a few weeks ago and his roommate recently had COVID.  Mother also reported to admitting physician that his home environment is unclean, there are cats, mold and trash everywhere.  In the emergency room he was intubated/sedated due to erratic and unsafe behavior, a lumbar puncture was performed demonstrating 2900 red blood cells and 225 white blood cells 88% lymphocytes with a very elevated protein of 186. .  Serotonin syndrome was suspected as well.  He was begun on ceftriaxone vancomycin and  IV acyclovir and admitted to the intensive care unit    Lactic acid was 14, white blood cells 17,000, creatinine 1.5, glucose 213, CPK  was 592  He has no fever and WBC are improved, heart rate is less than 100, BP still elevated, seem to have some posturing. HIV negative 2020.     4/26: interim reviewed. Visited yesterday afternoon and he was extubated and mother at bedside. CSF PCR panel was negative for all. CSF culture is negative so far. Respiratory viral panel(NP) was positive for parainfluenza 3. This is not included in the CSF pcr panel. Parainfluenza can cause meningitis. No fever, WBC improved. CXR with trace atelectasis left base. Only normal gunjan on sputum culture and urine and blood cultures negative. . He is alert and interactive. He denies headache now and describes chronic frontal headaches, which were worse recently. Mother states privately that he is not at baseline personality and is more angry and aggressive and this is much different than usual. He desires mejia out and diet and will order.   4/27: interim reviewed. Afebrile., started on AEDs due to abnormal EEG and focal physical movements, localizing to frontal and not temporal. MRI of the brain was unremarkable. CSF culture is negative. HIV Ab/Ag negative. Multiple serologies pending. He has mild frontal headache, mild generalized aches and pains. Minimal sinus congestion and cough. Voiding without difficulty. Brother at bedside. Discussed available results from studies.        EXAM & DIAGNOSTICS REVIEWED:   Vitals:     Temp:  [98 °F (36.7 °C)-98.6 °F (37 °C)]   Temp: 98.2 °F (36.8 °C) (04/27/22 0300)  Pulse: 96 (04/27/22 0600)  Resp: (!) 27 (04/27/22 0600)  BP: (!) 122/58 (04/27/22 0500)  SpO2: 96 % (04/27/22 0600)    Intake/Output Summary (Last 24 hours) at 4/27/2022 0758  Last data filed at 4/27/2022 0600  Gross per 24 hour   Intake 1562.5 ml   Output 1700 ml   Net -137.5 ml       General:  Alert and cooperative, falls asleep quickly  Eyes:  Anicteric, EOMI  ENT:  No ulcers, exudates, thrush, nares patent, dentition is very good  Neck:  Completely supple, no masses or  adenopathy appreciated  Lungs: clear  Heart:  RRR, no gallop/murmur/rub noted  Abd:  Soft, NT, ND, normal BS, no masses or organomegaly appreciated.  No perirectal or perianal lesions  :  voiding  Musc:  Joints without effusion, swelling, erythema, synovitis, muscle wasting.   Skin:  No rashes. No palmar or plantar lesions. No subungual petechiae. No track marks  Neuro: Alert , face symmetric, speech is fluent, no focal motor deficit. mentating well.     Psych:   Calm and cooperative  Lymphatic:        Extrem: No edema, erythema, phlebitis, cellulitis, warm and well perfused  VAD:   Right arm picc    Isolation:  none  Wound: none         General Labs reviewed:  Recent Labs   Lab 04/25/22  0343 04/26/22  0501 04/27/22  0452   WBC 13.88* 8.89 8.71   HGB 15.4 12.0* 12.0*   HCT 44.0 36.1* 34.8*    284 272       Recent Labs   Lab 04/25/22 0343 04/26/22  0501 04/27/22  0452    132* 143   K 4.1 3.8 3.6    103 107   CO2 24 27 28   BUN 13 13 12   CREATININE 1.3 1.1 1.4   CALCIUM 9.1 7.9* 8.5*   PROT 7.5 6.4 6.4   BILITOT 0.5 0.8 0.4   ALKPHOS 132 104 107   ALT 25 24 28   AST 31 39 36     Recent Labs   Lab 04/25/22  0343   CRP 0.55        Latest Reference Range & Units 04/24/22 22:00   COLOR CSF Colorless  Red !   Heme Aliquot mL 3.0   Appearance, CSF Clear  Bloody !   RBC, CSF 0 /cu mm 2965 !   WBC, CSF 0 - 5 /cu mm 225 (HH) [1]   Segmented Neutrophils, CSF 0 - 6 % 2   Lymphs, CSF 40 - 80 % 88 (H)   Mono/Macrophage, CSF 15 - 45 % 10 (L)   Glucose, CSF 40 - 70 mg/dL 80 (H) [2]   Protein, CSF 15 - 40 mg/dL 186 (H) [3]       Micro:  Microbiology Results (last 7 days)     Procedure Component Value Units Date/Time    Urine Culture High Risk [[id number]] Collected: 04/25/22 0143    Order Status: Completed Specimen: Urine, Catheterized Updated: 04/27/22 0743     Urine Culture, Routine No growth to date    Narrative:      Indicated criteria for high risk culture:->Other  Other (specify):->HIGH RISK    Blood  culture [[id number]] Collected: 04/24/22 1920    Order Status: Completed Specimen: Blood from Peripheral, Antecubital, Right Updated: 04/26/22 2032     Blood Culture, Routine No Growth to date      No Growth to date      No Growth to date    Blood culture [[id number]] Collected: 04/24/22 1925    Order Status: Completed Specimen: Blood from Peripheral, Antecubital, Right Updated: 04/26/22 2032     Blood Culture, Routine No Growth to date      No Growth to date      No Growth to date    Culture, Respiratory with Gram Stain [[id number]] Collected: 04/25/22 0000    Order Status: Completed Specimen: Respiratory from Endotracheal Aspirate Updated: 04/26/22 1409     Respiratory Culture Normal respiratory gunjan     Gram Stain (Respiratory) <10 epithelial cells per low power field.     Gram Stain (Respiratory) Many WBC's     Gram Stain (Respiratory) Many Gram positive cocci     Gram Stain (Respiratory) Few Gram negative rods    CSF culture and Gram Stain (Tube 2) [[id number]] Collected: 04/24/22 2200    Order Status: Completed Specimen: CSF (Spinal Fluid) from CSF Tap, Tube 2 Updated: 04/26/22 1402     CSF CULTURE No Growth to date     Gram Stain Result Few WBC's      No organisms seen    Narrative:      On which sequentially labeled tube should this analysis be  performed?->2    Respiratory Infection Panel (PCR), Nasopharyngeal [[id number]]  (Abnormal) Collected: 04/25/22 0347    Order Status: Completed Updated: 04/25/22 1505     Respiratory Infection Panel Source NP swab     Adenovirus Not Detected     Coronavirus 229E, Common Cold Virus Not Detected     Coronavirus HKU1, Common Cold Virus Not Detected     Coronavirus NL63, Common Cold Virus Not Detected     Coronavirus OC43, Common Cold Virus Not Detected     Comment: The Coronavirus strains detected in this test cause the common cold.  These strains are not the COVID-19 (novel Coronavirus)strain   associated with the respiratory disease outbreak.          SARS-CoV2 (COVID-19)  Qualitative PCR Not Detected     Human Metapneumovirus Not Detected     Human Rhinovirus/Enterovirus Not Detected     Influenza A (subtypes H1, H1-2009,H3) Not Detected     Influenza B Not Detected     Parainfluenza Virus 1 Not Detected     Parainfluenza Virus 2 Not Detected     Parainfluenza Virus 3 Detected     Parainfluenza Virus 4 Not Detected     Respiratory Syncytial Virus Not Detected     Bordetella Parapertussis (AK0661) Not Detected     Bordetella pertussis (ptxP) Not Detected     Chlamydia pneumoniae Not Detected     Mycoplasma pneumoniae Not Detected     Comment: Respiratory Infection Panel testing performed by Multiplex PCR.       Narrative:      Specimen Source->Nasopharyngeal Swab    Fungus culture [[id number]] Collected: 04/24/22 2200    Order Status: No result Specimen: CSF (Spinal Fluid) Updated: 04/25/22 0814    Gladys Ink (CSF) [[id number]] Collected: 04/24/22 2200    Order Status: Completed Specimen: CSF (Spinal Fluid) Updated: 04/25/22 0805     Gladys Ink No encapsulated yeast seen    Fungus culture [[id number]]     Order Status: No result Specimen: CSF (Spinal Fluid) from CSF Tap, Tube 1     Cryptococcal antigen [[id number]]     Order Status: Completed Specimen: Blood, Venous     Gladys Ink (CSF) [[id number]]     Order Status: Completed Specimen: CSF (Spinal Fluid) from CSF Tap, Tube 1     MRSA Screen by PCR [[id number]] Collected: 04/25/22 0347    Order Status: Completed Specimen: Nasopharyngeal Swab from Nasal Updated: 04/25/22 0559     MRSA SCREEN BY PCR Negative    Group A Strep, Molecular [[id number]] Collected: 04/25/22 0347    Order Status: Completed Specimen: Throat Updated: 04/25/22 0459     Group A Strep, Molecular Negative     Comment: Arcanobacterium haemolyticum and Beta Streptococcus group C   and G will not be detected by this test method.  Please order   Throat Culture ([id number]) if suspected.         Resp Viral Panel PCR, Peds Under 7 Months Nasopharyngeal Swab [[id number]] Collected:  04/25/22 0347    Order Status: Canceled           Imaging Reviewed:   CXR   CT head   IMPRESSION:  1. No acute intracranial pathology.  2. Underlying paranasal sinus disease.    Cardiology:  TTE 4/19  · The left ventricle is normal in size with concentric remodeling and normal systolic function.  · The estimated ejection fraction is 65%.  · Normal left ventricular diastolic function.  · There is no evidence of intracardiac shunting. Bubble study negative for PFO  · Atrial fibrillation not observed.  · Normal right ventricular size with normal right ventricular systolic function.  · Normal central venous pressure (3 mmHg).  · The estimated PA systolic pressure is 11 mmHg.         IMPRESSION & PLAN   1. Meningoencephalitis   Cell count/diff suggests non bacterial etiology   HIV negative 2020 makes fungal and opportunistic meningoencephalitis much less likely   Negative CSF pcr panel, additional serologies pending   Behavioral/personality changes, apparently for a while prior to illness   Abnormal EEG, started on AED    2. Recent TIA symptoms, negative MRI, likely prodrome for this illness  3. Paranasal sinus disease, more on 4/24 CT than last week   Parainfluenza 3 positive on nasal pcr           Recommendations:  Continue  Acyclovir, stopping vanc today and reducing rocephin to q24 for sinuses  Awaiting cryptococcal antigen , arbovirus antibody panel, West Nile Ab,    maintain good hydration     D/w patient, brother, nursing    [phone] mother       Medical Decision Making during this encounter was  [_] Low Complexity  [_] Moderate Complexity  [ xxx ] High Complexity

## 2022-04-27 NOTE — PROCEDURES
EEG REPORT    NAME: Iván Amaral  : 1985  MRN: [id number]    DATE of EE2022    CLINICAL INDICATION: 37-year-old gentleman with no significant past medical history who presented with altered mental status, and possibly had seizure-like event prior to presentation to the emergency room.  He was agitated, combative, hypertensive and tachycardic requiring intubation and sedation.  LP was done in the emergency room with evidence of  meningoencephalitis. Has had intermittent jerking of the extremities concerning for seizures. This is a repeat EEG after he was started on AEDs.    MEDICATIONS:  Scheduled Meds:   acyclovir  10 mg/kg (Ideal) Intravenous Q8H    cefTRIAXone (ROCEPHIN) IVPB  2 g Intravenous Q24H    chlorhexidine  15 mL Mouth/Throat BID    divalproex  500 mg Oral Q8H    mupirocin   Nasal BID    pantoprazole  40 mg Intravenous Daily     Continuous Infusions:  PRN Meds:.acetaminophen, albuterol-ipratropium, calcium chloride IVPB, calcium chloride IVPB, calcium chloride IVPB, dextrose 10%, dextrose 10%, dextrose 50%, dextrose 50%, glucagon (human recombinant), glucose, glucose, hydrALAZINE, hydrALAZINE, lorazepam, lorazepam, magnesium oxide, magnesium sulfate IVPB, magnesium sulfate IVPB, magnesium sulfate IVPB, magnesium sulfate IVPB, naloxone, ondansetron, potassium chloride in water, potassium chloride in water, potassium chloride in water, potassium chloride in water, potassium chloride, potassium chloride, potassium chloride, potassium chloride, promethazine (PHENERGAN) IVPB, sodium chloride 0.9%, sodium phosphate IVPB, sodium phosphate IVPB, sodium phosphate IVPB, sodium phosphate IVPB, sodium phosphate IVPB      EEG DESCRIPTION:  A 16-channel EEG was performed with electrode placement in 10/20 International System. Longitudinal bipolar and referential montages were utilized in analysis.    This is a technically adequate study with minor muscle and motion artifacts.    The dominant  posterior background rhythm was a well modulated, symmetric, synchronous, reactive, 7-8 Hz.    The record was reactive to eye opening and closure. Anterior derivations showed the expected admixture of low-voltage beta and theta frequencies as well as intermittent eye blink artifact.    Drowsiness was characterized by voltage attenuation and lateral eye movements.    There is Frontal Intermittent Rhythmic Delta Activity (FIRDA), less frequent that prior EEG. No clinical correlation was reported by nursing. No generalization of rhythmic activity.    Photic stimulation was not performed. Hyperventilation  was not performed.    No electrographic or electroclinical seizures were recorded.    DIAGNOSIS: This is a abnormal EEG due to the presence of mild to moderate generalized slowing consistent encephalopathy as well as Frontal Intermittent Rhythmic Delta Activity (FIRDA). These do not evolve into generalized epileptiform activity. No electrographic or electroclinical seizures are recorded.       Flor Navarro MD  Neurology

## 2022-04-27 NOTE — PT/OT/SLP EVAL
Occupational Therapy   Evaluation    Name: Iván Amaral  MRN: [id number]  Admitting Diagnosis:  Meningitis  Recent Surgery: * No surgery found *      Recommendations:     Discharge Recommendations: home  Discharge Equipment Recommendations:  none  Barriers to discharge:  None    Assessment:     Iván Amaral is a 37 y.o. male with a medical diagnosis of Meningitis. Pt agreeable to therapy evaluation this AM. Performance deficits affecting function: weakness, impaired endurance, impaired self care skills, impaired functional mobilty, gait instability, impaired balance, decreased lower extremity function, decreased safety awareness, pain, impaired cardiopulmonary response to activity.      Rehab Prognosis: Good; patient would benefit from acute skilled OT services to address these deficits and reach maximum level of function.       Plan:     Patient to be seen 3 x/week to address the above listed problems via self-care/home management, therapeutic activities, therapeutic exercises  · Plan of Care Expires: 05/27/22  · Plan of Care Reviewed with: patient, mother    Subjective     Chief Complaint: generalized pain  Patient/Family Comments/goals: none stated    Occupational Profile:  Living Environment: Pt lives with roommates in a 1 story home with 4 steps to enter. Pt has a tub/shower combo. Pt will be staying with his mom at d/c (his mother's home in a 1 story home with a walk-in shower and a shower chair)  Previous level of function: Independent with ADLs, IADLs, and functional mobility  Roles and Routines: drives; works  Equipment Used at Home:  none  Assistance upon Discharge: yes, from mother    Pain/Comfort:  · Pain Rating 1: 2/10  · Location - Orientation 1: generalized  · Pain Addressed 1: Reposition, Distraction, Cessation of Activity    Patients cultural, spiritual, Restoration conflicts given the current situation:      Objective:     Communicated with: nursing prior to session.  Patient found up  in chair with peripheral IV, telemetry, blood pressure cuff, pulse ox (continuous) upon OT entry to room.    General Precautions: Standard, fall   Orthopedic Precautions:N/A   Braces: N/A  Respiratory Status: Room air    Occupational Performance:    Functional Mobility/Transfers:  · Patient completed Sit <> Stand Transfer with contact guard assistance  with  no assistive device   · Patient completed Toilet Transfer Step Transfer technique with contact guard assistance with  no AD  · Functional Mobility: pt amb around room with CGA with no AD, no LOB, no SOB    Activities of Daily Living:  · Feeding:  independence per pt  · Grooming: stand by assistance and contact guard assistance standing at sink (simulated oral care)  · Lower Body Dressing: stand by assistance seated in chair to don/doff socks    Cognitive/Visual Perceptual:  Cognitive/Psychosocial Skills:     -       Oriented to: Person, Place, Time and Situation   -       Follows Commands/attention:Follows two-step commands  -       Communication: clear/fluent  -       Memory: No Deficits noted  -       Safety awareness/insight to disability: impaired   -       Mood/Affect/Coping skills/emotional control: Appropriate to situation, Cooperative and Flat affect    Physical Exam:  Balance:    -       SBA seated balance; SBA/CGA standing balance   Upper Extremity Range of Motion:     -       Right Upper Extremity: WFL  -       Left Upper Extremity: WFL  Upper Extremity Strength:    -       Right Upper Extremity: WFL  -       Left Upper Extremity: WFL   Strength:    -       Right Upper Extremity: WFL  -       Left Upper Extremity: WFL  Fine Motor Coordination:    -       Intact  Gross motor coordination:   WFL    AMPAC 6 Click ADL:  AMPAC Total Score: 21    Treatment & Education:  Pt educated on role of OT/POC, importance of OOB/EOB activity, use of call bell, and safety during ADLs, transfers, and functional mobility.  Education:    Patient left up in chair with  all lines intact, call button in reach, RN notified and family members present    GOALS:   Multidisciplinary Problems     Occupational Therapy Goals        Problem: Occupational Therapy    Goal Priority Disciplines Outcome Interventions   Occupational Therapy Goal     OT, PT/OT     Description: Goals to be met by: discharge    Patient will increase functional independence with ADLs by performing:    UE Dressing with Supervision.  LE Dressing with Supervision.  Grooming while standing at sink with Supervision.  Toileting from toilet with Supervision for hygiene and clothing management.   Toilet transfer to toilet with Supervision.                     History:     Past Medical History:   Diagnosis Date    Acute gout of left foot 12/7/2020    Gout 2019    Hyperlipidemia     Stomach ulcer        Past Surgical History:   Procedure Laterality Date    NASAL SEPTUM SURGERY         Time Tracking:     OT Date of Treatment: 04/27/22  OT Start Time: 0945  OT Stop Time: 0959  OT Total Time (min): 14 min    Billable Minutes:Evaluation 6  Self Care/Home Management 8    4/27/2022

## 2022-04-27 NOTE — PLAN OF CARE
04/26/22 2048   Patient Assessment/Suction   Level of Consciousness (AVPU) alert   Respiratory Effort Unlabored   Expansion/Accessory Muscles/Retractions expansion symmetric   All Lung Fields Breath Sounds clear   Rhythm/Pattern, Respiratory depth regular;pattern regular   Cough Frequency infrequent   Cough Type good;nonproductive   PRE-TX-O2   O2 Device (Oxygen Therapy) nasal cannula   $ Is the patient on Low Flow Oxygen? Yes   Flow (L/min) 2   Pulse Oximetry Type Continuous   $ Pulse Oximetry - Multiple Charge Pulse Oximetry - Multiple

## 2022-04-27 NOTE — PT/OT/SLP EVAL
Physical Therapy Evaluation    Patient Name:  Iván Amaral   MRN:  [id number]    Recommendations:     Discharge Recommendations:  home health PT (initially discharging to mother's home)   Discharge Equipment Recommendations: none   Barriers to discharge: medical status    Assessment:     Iván Amaral is a 37 y.o. male admitted with a medical diagnosis of Meningitis.  He presents with the following impairments/functional limitations:  weakness, impaired endurance, impaired self care skills, impaired functional mobilty, gait instability, impaired balance, decreased lower extremity function, decreased safety awareness, pain, impaired cardiopulmonary response to activity.    Mother present throughout visit. Pt presents with mild shortness of breath post ambulation throughout SAO2 WFL on RA.    Rehab Prognosis: Fair; patient would benefit from acute skilled PT services to address these deficits and reach maximum level of function.    Recent Surgery: * No surgery found *      Plan:     During this hospitalization, patient to be seen 3 x/week to address the identified rehab impairments via gait training, therapeutic activities, therapeutic exercises, neuromuscular re-education and progress toward the following goals:    · Plan of Care Expires:  05/27/22    Subjective     Chief Complaint: generalized pain  Patient/Family Comments/goals: discharge home to mother's house   Pain/Comfort:  · Pain Rating 1: 2/10  · Location - Orientation 1: generalized  · Pain Addressed 1: Reposition, Distraction, Cessation of Activity    Patients cultural, spiritual, Ep[employer] conflicts given the current situation: no    Living Environment:  Pt lives with roommates in a one story home with 4 PEPE with no HR. Patient anticipates discharge to his mother's home which is one story with no PEPE. (+)   Prior to admission, patients level of function was Independent no AD.  Equipment used at home: none.  DME owned (not currently  used): bedside commode and shower chair.  Upon discharge, patient will have assistance from mother.    Objective:     Communicated with Rn prior to session.  Patient found up in chair with peripheral IV, telemetry, blood pressure cuff, pulse ox (continuous)  upon PT entry to room.    General Precautions: Standard, fall   Orthopedic Precautions:N/A   Braces: N/A  Respiratory Status: Room air    Exams:  · Cognitive Exam:  Patient is oriented to Person, Place, Time and Situation  · RLE ROM: WFL  · RLE Strength: WFL except hip flexion 3+/5 limited due to report of pain  · LLE ROM: WFL  · LLE Strength: WFL except hip flexion 3+/5 limited due to report of pain    Functional Mobility:  · Transfers:     · Sit to Stand:  contact guard assistance with no AD  · Gait: 100 ft with no AD and CGA  · Stairs:  Pt ascended/descended 3 stair(s) with No Assistive Device with right handrail with Minimal Assistance.     Therapeutic Activities and Exercises:   Pt educated on POC, discharge recommendation, importance of time OOB, pacing/energy conservation, pursed lip breathing, need for assist with mobility, use of call bell to seek assistance as needed and fall prevention      AM-PAC 6 CLICK MOBILITY  Total Score:17     Patient left up in chair with all lines intact, call button in reach, RN notified and mother and brother present.    GOALS:   Multidisciplinary Problems     Physical Therapy Goals        Problem: Physical Therapy    Goal Priority Disciplines Outcome Goal Variances Interventions   Physical Therapy Goal     PT, PT/OT Ongoing, Progressing     Description: Goals to be met by: Discharge     Patient will increase functional independence with mobility by performin. Bed to chair transfer with independent no AD  2. Gait  x 200 feet with Supervision using no AD  3. Asc/Desc 4 steps with no hand rail and supervision                         History:     Past Medical History:   Diagnosis Date    Acute gout of left foot  12/7/2020    Gout 2019    Hyperlipidemia     Stomach ulcer        Past Surgical History:   Procedure Laterality Date    NASAL SEPTUM SURGERY         Time Tracking:     PT Received On: 04/27/22  PT Start Time: 0946     PT Stop Time: 1000  PT Total Time (min): 14 min     Billable Minutes: Evaluation 6 and Gait Training 8      04/27/2022

## 2022-04-27 NOTE — SUBJECTIVE & OBJECTIVE
Interval History: PT and OT ordered. No seizure like activity    Review of Systems   Constitutional:  Positive for diaphoresis and fatigue.   HENT: Negative.     Eyes: Negative.    Respiratory:          ZAVALETA   Cardiovascular: Negative.    Gastrointestinal: Negative.    Endocrine: Positive for heat intolerance.   Genitourinary: Negative.    Musculoskeletal:  Positive for arthralgias, gait problem and myalgias.   Skin: Negative.    Allergic/Immunologic: Negative.    Neurological:  Positive for weakness.   Hematological: Negative.    Psychiatric/Behavioral:  Positive for dysphoric mood. The patient is nervous/anxious.         Resolving   Objective:     Vital Signs (Most Recent):  Temp: 98.1 °F (36.7 °C) (04/27/22 1100)  Pulse: 90 (04/27/22 1600)  Resp: 18 (04/27/22 1600)  BP: 124/79 (04/27/22 1200)  SpO2: (!) 94 % (04/27/22 1600)   Vital Signs (24h Range):  Temp:  [98 °F (36.7 °C)-98.4 °F (36.9 °C)] 98.1 °F (36.7 °C)  Pulse:  [64-96] 90  Resp:  [14-27] 18  SpO2:  [93 %-100 %] 94 %  BP: (109-132)/(56-87) 124/79     Weight: 99.3 kg (218 lb 14.7 oz)  Body mass index is 28.11 kg/m².    Intake/Output Summary (Last 24 hours) at 4/27/2022 1640  Last data filed at 4/27/2022 1600  Gross per 24 hour   Intake 1770 ml   Output 1400 ml   Net 370 ml      Physical Exam  Vitals and nursing note reviewed.   Constitutional:       General: He is not in acute distress.  HENT:      Head: Normocephalic and atraumatic.      Nose: Nose normal.      Mouth/Throat:      Mouth: Mucous membranes are moist.   Eyes:      Extraocular Movements: Extraocular movements intact.      Pupils: Pupils are equal, round, and reactive to light.   Cardiovascular:      Rate and Rhythm: Normal rate and regular rhythm.   Pulmonary:      Effort: Pulmonary effort is normal.      Breath sounds: Normal breath sounds.   Abdominal:      General: Bowel sounds are normal. There is no distension.      Tenderness: There is no abdominal tenderness. There is no guarding or  rebound.   Musculoskeletal:      Cervical back: Normal range of motion and neck supple.      Comments: Intermittent extremity weakness right resolving   Skin:     General: Skin is warm.   Neurological:      Mental Status: He is alert and oriented to person, place, and time.   Psychiatric:      Comments: Mildly dysphoric mood       Significant Labs: All pertinent labs within the past 24 hours have been reviewed.  Recent Lab Results         04/27/22  0452        Albumin 3.4       Alkaline Phosphatase 107       ALT 28       Anion Gap 8       AST 36       Baso # 0.04       Basophil % 0.5       BILIRUBIN TOTAL 0.4  Comment: For infants and newborns, interpretation of results should be based  on gestational age, weight and in agreement with clinical  observations.    Premature Infant recommended reference ranges:  Up to 24 hours.............<8.0 mg/dL  Up to 48 hours............<12.0 mg/dL  3-5 days..................<15.0 mg/dL  6-29 days.................<15.0 mg/dL         BUN 12       Calcium 8.5       Chloride 107       CO2 28       Creatinine 1.4       Differential Method Automated       eGFR if  >60.0       eGFR if non  >60.0  Comment: Calculation used to obtain the estimated glomerular filtration  rate (eGFR) is the CKD-EPI equation.          Eos # 0.2       Eosinophil % 2.5       Glucose 89       Gran # (ANC) 5.4       Gran % 61.5       Hematocrit 34.8       Hemoglobin 12.0       Immature Grans (Abs) 0.04  Comment: Mild elevation in immature granulocytes is non specific and   can be seen in a variety of conditions including stress response,   acute inflammation, trauma and pregnancy. Correlation with other   laboratory and clinical findings is essential.         Immature Granulocytes 0.5       Lymph # 2.2       Lymph % 24.7       MCH 29.3       MCHC 34.5       MCV 85       Mono # 0.9       Mono % 10.3       MPV 9.8       nRBC 0       Platelets 272       Potassium 3.6       PROTEIN  TOTAL 6.4       RBC 4.10       RDW 12.3       Sodium 143  Comment: Delta check review       Valproic Acid Lvl 25.4  Comment: Valproic Acid (ug/ml)  Toxic:   >100.0 ug/ml         WBC 8.71               Significant Imaging: I have reviewed all pertinent imaging results/findings within the past 24 hours.

## 2022-04-28 LAB
ALBUMIN SERPL BCP-MCNC: 3.5 G/DL (ref 3.5–5.2)
ALP SERPL-CCNC: 127 U/L (ref 55–135)
ALT SERPL W/O P-5'-P-CCNC: 36 U/L (ref 10–44)
ANION GAP SERPL CALC-SCNC: 9 MMOL/L (ref 8–16)
AST SERPL-CCNC: 31 U/L (ref 10–40)
BACTERIA CSF CULT: NO GROWTH
BACTERIA UR CULT: NO GROWTH
BASOPHILS # BLD AUTO: 0.04 K/UL (ref 0–0.2)
BASOPHILS NFR BLD: 0.5 % (ref 0–1.9)
BILIRUB SERPL-MCNC: 0.4 MG/DL (ref 0.1–1)
BUN SERPL-MCNC: 9 MG/DL (ref 6–20)
CALCIUM SERPL-MCNC: 8.6 MG/DL (ref 8.7–10.5)
CHLORIDE SERPL-SCNC: 102 MMOL/L (ref 95–110)
CO2 SERPL-SCNC: 32 MMOL/L (ref 23–29)
CREAT SERPL-MCNC: 1.3 MG/DL (ref 0.5–1.4)
DIFFERENTIAL METHOD: ABNORMAL
EOSINOPHIL # BLD AUTO: 0.4 K/UL (ref 0–0.5)
EOSINOPHIL NFR BLD: 4.6 % (ref 0–8)
ERYTHROCYTE [DISTWIDTH] IN BLOOD BY AUTOMATED COUNT: 12.2 % (ref 11.5–14.5)
EST. GFR  (AFRICAN AMERICAN): >60 ML/MIN/1.73 M^2
EST. GFR  (NON AFRICAN AMERICAN): >60 ML/MIN/1.73 M^2
GLUCOSE SERPL-MCNC: 95 MG/DL (ref 70–110)
GRAM STN SPEC: NORMAL
GRAM STN SPEC: NORMAL
HCT VFR BLD AUTO: 38.2 % (ref 40–54)
HGB BLD-MCNC: 12.7 G/DL (ref 14–18)
IMM GRANULOCYTES # BLD AUTO: 0.02 K/UL (ref 0–0.04)
IMM GRANULOCYTES NFR BLD AUTO: 0.3 % (ref 0–0.5)
LABCORP MISC TEST CODE: NORMAL
LABCORP MISC TEST NAME: NORMAL
LABCORP MISCELLANEOUS TEST: NORMAL
LYMPHOCYTES # BLD AUTO: 1.6 K/UL (ref 1–4.8)
LYMPHOCYTES NFR BLD: 20.4 % (ref 18–48)
MCH RBC QN AUTO: 29.1 PG (ref 27–31)
MCHC RBC AUTO-ENTMCNC: 33.2 G/DL (ref 32–36)
MCV RBC AUTO: 87 FL (ref 82–98)
MONOCYTES # BLD AUTO: 0.7 K/UL (ref 0.3–1)
MONOCYTES NFR BLD: 9.2 % (ref 4–15)
NEUTROPHILS # BLD AUTO: 5 K/UL (ref 1.8–7.7)
NEUTROPHILS NFR BLD: 65 % (ref 38–73)
NRBC BLD-RTO: 0 /100 WBC
PLATELET # BLD AUTO: 311 K/UL (ref 150–450)
PMV BLD AUTO: 9.8 FL (ref 9.2–12.9)
POTASSIUM SERPL-SCNC: 3.8 MMOL/L (ref 3.5–5.1)
PROT SERPL-MCNC: 6.8 G/DL (ref 6–8.4)
RBC # BLD AUTO: 4.37 M/UL (ref 4.6–6.2)
SODIUM SERPL-SCNC: 143 MMOL/L (ref 136–145)
VALPROATE SERPL-MCNC: 45.6 UG/ML (ref 50–100)
WBC # BLD AUTO: 7.68 K/UL (ref 3.9–12.7)

## 2022-04-28 PROCEDURE — 99233 SBSQ HOSP IP/OBS HIGH 50: CPT | Mod: ,,, | Performed by: INTERNAL MEDICINE

## 2022-04-28 PROCEDURE — 63600175 PHARM REV CODE 636 W HCPCS: Performed by: INTERNAL MEDICINE

## 2022-04-28 PROCEDURE — 25000003 PHARM REV CODE 250: Performed by: INTERNAL MEDICINE

## 2022-04-28 PROCEDURE — 80164 ASSAY DIPROPYLACETIC ACD TOT: CPT | Performed by: INTERNAL MEDICINE

## 2022-04-28 PROCEDURE — 85025 COMPLETE CBC W/AUTO DIFF WBC: CPT | Performed by: FAMILY MEDICINE

## 2022-04-28 PROCEDURE — 25000003 PHARM REV CODE 250: Performed by: FAMILY MEDICINE

## 2022-04-28 PROCEDURE — 21000000 HC CCU ICU ROOM CHARGE

## 2022-04-28 PROCEDURE — 94761 N-INVAS EAR/PLS OXIMETRY MLT: CPT

## 2022-04-28 PROCEDURE — 97116 GAIT TRAINING THERAPY: CPT | Mod: CQ

## 2022-04-28 PROCEDURE — 63600175 PHARM REV CODE 636 W HCPCS: Performed by: FAMILY MEDICINE

## 2022-04-28 PROCEDURE — 99233 PR SUBSEQUENT HOSPITAL CARE,LEVL III: ICD-10-PCS | Mod: ,,, | Performed by: INTERNAL MEDICINE

## 2022-04-28 PROCEDURE — 80053 COMPREHEN METABOLIC PANEL: CPT | Performed by: FAMILY MEDICINE

## 2022-04-28 PROCEDURE — C9113 INJ PANTOPRAZOLE SODIUM, VIA: HCPCS | Performed by: INTERNAL MEDICINE

## 2022-04-28 PROCEDURE — 99900035 HC TECH TIME PER 15 MIN (STAT)

## 2022-04-28 PROCEDURE — 97535 SELF CARE MNGMENT TRAINING: CPT

## 2022-04-28 PROCEDURE — 25000003 PHARM REV CODE 250: Performed by: STUDENT IN AN ORGANIZED HEALTH CARE EDUCATION/TRAINING PROGRAM

## 2022-04-28 RX ADMIN — PANTOPRAZOLE SODIUM 40 MG: 40 INJECTION, POWDER, LYOPHILIZED, FOR SOLUTION INTRAVENOUS at 09:04

## 2022-04-28 RX ADMIN — CHLORHEXIDINE GLUCONATE 15 ML: 1.2 RINSE ORAL at 09:04

## 2022-04-28 RX ADMIN — ACETAMINOPHEN 650 MG: 325 TABLET ORAL at 02:04

## 2022-04-28 RX ADMIN — CEFTRIAXONE 2 G: 2 INJECTION, SOLUTION INTRAVENOUS at 09:04

## 2022-04-28 RX ADMIN — DIVALPROEX SODIUM 500 MG: 250 TABLET, DELAYED RELEASE ORAL at 05:04

## 2022-04-28 RX ADMIN — ACYCLOVIR SODIUM 680 MG: 500 INJECTION, SOLUTION INTRAVENOUS at 11:04

## 2022-04-28 RX ADMIN — POTASSIUM CHLORIDE 20 MEQ: 1500 TABLET, EXTENDED RELEASE ORAL at 06:04

## 2022-04-28 RX ADMIN — MUPIROCIN: 20 OINTMENT TOPICAL at 09:04

## 2022-04-28 RX ADMIN — DIVALPROEX SODIUM 500 MG: 250 TABLET, DELAYED RELEASE ORAL at 09:04

## 2022-04-28 RX ADMIN — ACETAMINOPHEN 650 MG: 325 TABLET ORAL at 06:04

## 2022-04-28 RX ADMIN — ACYCLOVIR SODIUM 680 MG: 500 INJECTION, SOLUTION INTRAVENOUS at 09:04

## 2022-04-28 RX ADMIN — DIVALPROEX SODIUM 500 MG: 250 TABLET, DELAYED RELEASE ORAL at 02:04

## 2022-04-28 RX ADMIN — ACYCLOVIR SODIUM 680 MG: 500 INJECTION, SOLUTION INTRAVENOUS at 04:04

## 2022-04-28 RX ADMIN — ACYCLOVIR SODIUM 680 MG: 500 INJECTION, SOLUTION INTRAVENOUS at 12:04

## 2022-04-28 RX ADMIN — ACETAMINOPHEN 1000 MG: 10 INJECTION INTRAVENOUS at 05:04

## 2022-04-28 NOTE — PROGRESS NOTES
Formerly [facility]  Neurology Progress Note    Patient Name: Iván Amaral  MRN: [id number]  : 1985  TODAY'S DATE: 2022  ADMIT DATE: 2022  5:57 PM                                          CONSULTED PROVIDER: Flor Navarro MD, Neurologist. Cellphone: [phone]  CONSULT REQUESTED BY: Dr. Burton    Chief Complaint   Patient presents with    Seizures       HPI per EMR:  Iván Amaral is a 37 y.o. White male   With PMH of gastritis, HLD, gout,  who presents with AMS.     Pt recently discharged from [facility] on 22,  He was evaluated for headaches with R paraesthesia   There was initially concern for SAH on CT head  However MRI Brain was without SAH  Pt was evaluated by neurology during inpt stay  Pt was treated as TIA  All neurological deficits had resolved by discharge  EEG was to be done outpt basis     Pt is currently intubated on MV  Pt's mother at bedside supplements history  Pt had a reoccurrence of headache with paraesthesias on Wed  He called nursing line, was encouraged to follow-up with his PCP  The symptoms self resolved     Pt continued to feel unwell but could not pinpoint why  He was afraid and asked his mother to monitor him  She is an ER nurse in [place] and came to stay with him  She reports episodes of diaphoresis and pallor  Pt would not let her take his temperature  VS were otherwise stable and pt remained at mental status baseline  Pt's mother also notes a lingering dry cough  He had an URI a few weeks ago  Pt's roommate recently had COVID     Pt eventually sent his mother home--she reports she didn't get around the corner  He called her almost immediately with slurred speech  When she returned to him, he was on the couch unable to move   He was also experiencing a feeling of impending doom  They returned to ER with EMS  EMS reports a staring episode with L gaze that lasted approx 45 seconds  They report he was anxious, excitable,  diaphoretic  Began to experience difficulty swallowing + aphasia     Pt became increasingly agitated and combative in the ER  HR 150s  BP 220s/110s  Pt was sedated, and LP done in ER  Pt currently intubated on MV  Beforehand he told ER physician he took 2 prozac  His mother reports she isn't sure if this is true  Pt no longer takes prozac, he takes welbutrin  Pt is also usually very careful with his medication  He always asks her before mixing any medication  He is usually laid back and without anxiety as well  It is unusual for him to be anxious or agitated     Per pt's mother, pt lives in a filthy house with 3 other men  With cats  There is mold and trash everywhere  She reports they are hoarders    Neurology Consult:  Patient was seen and examined by me today.  He is a 37-year-old gentleman with no significant past medical history who presented initially on 04/19/2022 for right sided numbness, with negative MRI brain for bleed or stroke, treated as a TIA and then discharged.  Then again he presented with altered mental status, and possibly has seizure-like event prior to presentation to the emergency room.  He was agitated, combative, hypertensive and tachycardic requiring intubation and sedation.  LP was done in the emergency room with evidence of possible meningitis/encephalitis.  Neurology consulted for assistance with management.    4/26/22:  Patient was seen and examined by me today. He was successfully extubated yesterday without issues. He did exhibit some jerking of the extremities associated to EEG changes concerning for focal seizure, so started on AED. Neuro exam is non focal.    4/27/22: Patient was seen and examined by me today. Complains of nausea, but otherwise, he is neurological exam is intact. Exam is non focal. He does report some hallucinations when he closes his eyes, but denies any other deficits.     4/28/22:  Patient was seen examined by me today.  He was stepped down from the ICU,  neurological examination intact.  He denies any nausea at this time.     Scheduled Meds:   acyclovir  10 mg/kg (Ideal) Intravenous Q8H    cefTRIAXone (ROCEPHIN) IVPB  2 g Intravenous Q24H    chlorhexidine  15 mL Mouth/Throat BID    divalproex  500 mg Oral Q8H    mupirocin   Nasal BID    pantoprazole  40 mg Intravenous Daily     Continuous Infusions:    PRN Meds:.acetaminophen, albuterol-ipratropium, calcium chloride IVPB, calcium chloride IVPB, calcium chloride IVPB, dextrose 10%, dextrose 10%, dextrose 50%, dextrose 50%, glucagon (human recombinant), glucose, glucose, hydrALAZINE, hydrALAZINE, lorazepam, lorazepam, magnesium oxide, magnesium sulfate IVPB, magnesium sulfate IVPB, magnesium sulfate IVPB, magnesium sulfate IVPB, naloxone, ondansetron, potassium chloride in water, potassium chloride in water, potassium chloride in water, potassium chloride in water, potassium chloride, potassium chloride, potassium chloride, potassium chloride, promethazine (PHENERGAN) IVPB, sodium chloride 0.9%, sodium phosphate IVPB, sodium phosphate IVPB, sodium phosphate IVPB, sodium phosphate IVPB, sodium phosphate IVPB        Physical Exam  Current Vitals:  Vitals:    04/28/22 0815   BP:    Pulse: 66   Resp:    Temp:      Physical Exam:  General: somnolent  HEENT: PERRL, EOMI  CV: RRR  Lungs: CTAB  Abdomen: +BS    Neurological Exam    MENTAL STATUS EXAM:  Level of alertness: somnolent but arousable  Orientation/Awareness: intact to person and place, not time or situation  Language: fluent but slow. Comprehension mildly impaired, naming and repetition seem intact.    CRANIAL NERVE EXAM:  II/III: fundoscopic exam deferred, PERRL; visual fields full to threat  III/IV/VI: EOMI w/out evidence of nystagmus, strabismus, or evoked diplopia  V: no deficits appreciated to pinprick, temp, vibration; masseter strength intact bilaterally  VII: no facial asymmetry noted  VIII: no deficits in hearing bilaterally  IX/X: palate @ ML and  raises symmetrically  XI: shoulder shrug 5/5 bilaterally; head turn 5/5 bilaterally  XII: tongue to midline w/out asymmetry  No dysarthria noted on exam.    MOTOR EXAM:  Bulk and Tone: normal throughout  Patient is at least antigravity in all 4 extremities without asymmetry    REFLEXES:  Masseter (CN V) Not Tested  3+ in bilateral upper and lower extremities, no Russell's, no clonus. Downgoing plantars.      SENSORY EXAM  Withdraws to pain equally in all extremities.    COORDINATION/CEREBELLAR EXAM:  Deferred due to AMS    GAIT:  Deferred for safety.        Laboratory Data & Studies    Recent Labs   Lab 04/26/22  0501 04/27/22 0452 04/28/22 0439   WBC 8.89 8.71 7.68   HGB 12.0* 12.0* 12.7*    272 311   MCV 89 85 87       Recent Labs   Lab 04/25/22  0343 04/26/22  0501 04/27/22 0452 04/28/22 0439    132* 143 143   K 4.1 3.8 3.6 3.8    103 107 102   CO2 24 27 28 32*   BUN 13 13 12 9   * 166* 89 95   CALCIUM 9.1 7.9* 8.5* 8.6*   MG 2.5 2.3  --   --    PHOS 3.6 4.1  --   --        Recent Labs   Lab 04/26/22  0501 04/27/22 0452 04/28/22 0439   PROT 6.4 6.4 6.8   ALBUMIN 3.5 3.4* 3.5   BILITOT 0.8 0.4 0.4   AST 39 36 31   ALT 24 28 36   ALKPHOS 104 107 127       Recent Labs   Lab 04/25/22 0343   LABPT 13.6   INR 1.1   APTT 25.2       Recent Labs   Lab 04/25/22  0009 04/25/22 0343   HGBA1C 5.2  --    TRIG  --  257*       Imaging:  CT Head Without Contrast    Result Date: 4/24/2022  Head CT scan without contrast COMPARISONS: CTA head and neck dated April 18, 2022 ADDITIONAL PERTINENT HISTORY: New onset seizure TECHNIQUE:  Multiple axial images were obtained from the skull base to the vertex without IV contrast. One of the following dose optimization techniques was utilized in the performance of this exam: Automated exposure control; adjustment of the mA and/or kV according to the patient's size; or use of an iterative  reconstruction technique.  Specific details can be referenced in the  facility's radiology CT exam operational policy. FINDINGS: Midline shift: Negative Ventricles:  Negative Brain parenchyma:  Negative Extra-axial spaces:  Negative Intracranial vasculature:  Negative Osseous structures:  Negative Paranasal sinuses and mastoid air cells:  'S marked mucosal thickening involving both maxillary sinuses as well as the sphenoid sinuses and ethmoid air cells. Surrounding soft tissues and orbits:  Negative IMPRESSION: 1. No acute intracranial pathology. 2. Underlying paranasal sinus disease. Electronically signed by:  Tacho Shultz MD  4/24/2022 8:15 PM CDT Workstation: [id number]    CT Head Without Contrast    Result Date: 4/18/2022  CMS MANDATED QUALITY DATA - CT RADIATION 436 All CT scans at this facility utilize dose modulation, iterative reconstruction, and/or weight based dosing when appropriate to reduce radiation dose to as low as reasonably achievable. CLINICAL HISTORY: 37 years (1985) Male Right-sided numbness TECHNIQUE: CT HEAD WITHOUT IV CONTRAST. 121 images obtained. Axial CT of the brain without contrast using soft tissue and bone algorithm. Please note in the acute setting if there is a clinical concern for an acute stroke MRI would be more sensitive/specific for evaluation of ischemia. COMPARISON: None available. FINDINGS: Small linear hyperattenuating blush within a sulcus of the left temporal lobe (image 29), possibly an irregular vessel but suspicious for a tiny subarachnoid bleed. There is no hydrocephalus, herniation or midline shift and the basal/suprasellar cisterns are patent. No acute osseous abnormality is identified. The ventricles and sulci are normal. There is normal gray white differentiation.  Orbital contents appear within normal limits. There is soft tissue attenuation within the left external auditory canal most consistent with cerumen (consider correlation with direct visualization). The visualized paranasal sinuses show mucosal thickening in the right  greater than left maxillary sinus, mid ethmoid air cells, anterior sphenoid and frontal ethmoid recesses. There is heterogeneous foamy debris inferiorly on the right. The visualized mastoid air cells are clear. IMPRESSION: 1. Findings suspicious for a tiny subarachnoid bleed in a sulcus of the left temporal lobe. Consider correlation with either short-term follow-up or a contrast-enhanced MRI of the brain. 2. No hydrocephalus, herniation or midline shift. 3. Mucoperiosteal paranasal sinus disease most pronounced in the right maxillary sinus, consider correlation for acute sinusitis. RESULT NOTIFICATION: These observations were discussed by the dictating physician, by phone and acknowledged by the ordering provider BRI RODRIGUEZ JR at 4/18/2022 3:50 PM CDT . Electronically signed by:  Iván Anthony MD  4/18/2022 3:54 PM CDT Workstation: [id number]    MRI Brain W WO Contrast    Result Date: 4/18/2022  MRI of the brain with and without contrast Clinical history is numbness, TIA questionable small subarachnoid hemorrhage seen on CT brain 1534 hours 9.5 mL Gadavist IV contrast The ventricles and sulci are normal in size and configuration for age. There is no hemorrhage, mass or midline shift. There are no extra-axial fluid collections. There is no evidence of subarachnoid hemorrhage in the left temporal region. There is normal signal within the white matter on all sequences. There is no abnormality on the diffusion-weighted images to suggest acute infarct. . The cerebellum and brainstem are normal. There is no pathologic enhancement. There are flow voids within the cavernous portions of the internal carotid arteries and basilar artery indicating patency. The corpus callosum, cerebellar tonsils, midline structures and orbits are normal. The paranasal sinuses and mastoid air cells are clear. IMPRESSION: Negative MRI of the brain No evidence of subarachnoid hemorrhage within the left temporal region No pathologic  enhancement. Electronically signed by:  Clemencia Reyes MD  4/18/2022 6:15 PM CDT Workstation: [id number]    X-Ray Chest AP Portable    Result Date: 4/24/2022  HISTORY: Chest Pain FINDINGS: Portable chest radiograph at 1921 hours compared to 04/18/2020 shows interval insertion of a nasogastric tube, with the distal tip overlying the proximal gastric body, and the proximal port near the expected level of the gastroesophageal junction. The cardiomediastinal silhouette and pulmonary vasculature are stable and within normal limits. The lungs are hypoexpanded, with parahilar bandlike and irregular opacities suggesting atelectasis. No consolidation, large pleural effusion, evidence of pulmonary edema, or pneumothorax. No acute fractures or destructive osseous lesions. IMPRESSION: 1. Nasogastric tube as described. 2. Pulmonary hypoinflation, with perihilar opacities suggesting atelectasis. Electronically signed by:  Seamus Mercado MD  4/24/2022 7:46 PM CDT Workstation: [id number]    X-Ray Chest AP Portable    Result Date: 4/18/2022  XR CHEST 1 VIEW CLINICAL HISTORY: 37 years Male TIA COMPARISON: None FINDINGS: Cardiomediastinal silhouette is within normal limits. Lungs are normally expanded with no airspace consolidation. No pleural effusion or pneumothorax. No acute osseous abnormality. IMPRESSION: No acute pulmonary process. Electronically signed by:  Clemencia Reyes MD  4/18/2022 3:08 PM CDT Workstation: [id number]    Echo Saline Bubble? Yes    Result Date: 4/19/2022  · The left ventricle is normal in size with concentric remodeling and normal systolic function. · The estimated ejection fraction is 65%. · Normal left ventricular diastolic function. · There is no evidence of intracardiac shunting. Bubble study negative for PFO · Atrial fibrillation not observed. · Normal right ventricular size with normal right ventricular systolic function. · Normal central venous pressure (3 mmHg). · The estimated PA systolic  pressure is 11 mmHg.      CTA Head and Neck (xpd)    Result Date: 4/18/2022  EXAMINATION: CTA HEAD AND NECK (XPD) CLINICAL INDICATION: Male, 37 years old. TIA, right-sided numbness TECHNIQUE: Axial CT angiogram of the head and neck was performed with contrast. Multiplanar reformations as well as 3-D volume rendered imaging were reviewed at the workstation. Degree of stenosis was measured utilizing the NASCET method. CONTRAST: 100 mL Omnipaque 350 mL of IV. COMPARISON: None FINDINGS: CTA brain: The distal vertebral arteries, basilar artery and cavernous portion of the internal carotid arteries are patent. The anterior cerebral arteries, middle cerebral arteries and posterior cerebral arteries are widely patent without evidence of large vessel occlusion, stenosis, AVM or aneurysm. Aortic Arch and Great Vessel Origins: Three-vessel aortic arch. The visualized portion of the great vessels are widely patent. Subclavian Arteries: Widely patent. Right Common Carotid Artery: Widely patent. Right Internal Carotid Artery: Widely patent. Right External Carotid Artery: Widely patent. Left Common Carotid Artery: Widely patent. Left Internal Carotid Artery: Widely patent. Left External Carotid Artery: Widely patent. Right Vertebral Artery: Widely patent. Left Vertebral Artery: Widely patent. Dural Venous Sinuses: No evidence of thrombosis or occlusion. There is mucosal thickening in the right maxillary sinus. Neck Soft Tissues: Paraspinous soft tissues are normal. There are no acute osseous abnormalities. The visualized portion of the lung apices are clear. IMPRESSION: Negative CTA of the head and neck. This exam was performed according to our departmental dose-optimization program which includes automated exposure control, adjustment of the mA and/or kV according to patient size and/or use of iterative reconstruction technique. 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 Amaral is a 37-year-old gentleman with no significant past medical history who presented with altered mental status and seizure-like event. He was agitated, combative, hypertensive and tachycardic requiring intubation and sedation.  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  Most likely viral, but need to rule out autoimmune since mother reporting behavioral changes for several weeks preceding the event that led to hospital admission.  Neurology consulted for assistance with management.  - admitted to the ICU,on telemetry monitoring and q.1 hour neuro checks  - LP was performed, consistent with viral or autoimmune meningitis/encephalitis:  Elevated white blood count with lymphocytic predominance, moderately elevated protein  - HSV 1 and 2 PCR was ordered in the CSF and resulted negative.  Acyclovir can be discontinued.  - Ordered autoimmune encephalitis panel, needs to be sent from sample obtained from LP, lab was called yesterday  - West Nile virus in CSF was negative  - CSF cultures no growth to date  - infectious Disease following closely, appreciate recommendations  - EEG showed intermittent frontal rhythmic delta with clinical correlation concerning for focal seizure. There was no generalization. Patient arousable, able to follow commands. Repeat EEG improved, no reported seizure activity  - Continue Valproic acid 500 mg Q8H, can obtain new level tomorrow  - will follow along      · DVT prophylaxis with chemo/SCD prophylaxis    [phone] within 3 days from discharge.     Stroke education was provided including stroke risk factors modification and any acute neurological changes  including weakness, confusion, visual changes to come straight to the ER.     All questions were answered.                              Thank you kindly for including us in the care of this patient. Please do not hesitate to contact us with any questions.         55 minutes of care time has been spent evaluating with the patient. Time includes chart review not limited to diagnostic imaging, labs, and vitals, patient assessment, discussion with family and nursing, current order evaluations, and new order entries.      Flor Navarro MD  Neurology  Cellphone: [phone]

## 2022-04-28 NOTE — SUBJECTIVE & OBJECTIVE
Interval History: Mr Amaral has no seizure activity, is alert awake and responds to queries    Review of Systems   Constitutional:  Positive for diaphoresis and fatigue.   HENT: Negative.     Eyes: Negative.    Respiratory: Negative.     Cardiovascular: Negative.    Gastrointestinal: Negative.    Endocrine: Negative.    Genitourinary: Negative.    Musculoskeletal:  Positive for myalgias.   Skin: Negative.    Allergic/Immunologic: Negative.    Neurological:  Positive for weakness.   Hematological: Negative.    Psychiatric/Behavioral:  The patient is nervous/anxious.    Objective:     Vital Signs (Most Recent):  Temp: 98 °F (36.7 °C) (04/28/22 1500)  Pulse: 75 (04/28/22 1500)  Resp: 19 (04/28/22 1500)  BP: (!) 143/86 (04/28/22 1500)  SpO2: 96 % (04/28/22 1500)   Vital Signs (24h Range):  Temp:  [98 °F (36.7 °C)-98.5 °F (36.9 °C)] 98 °F (36.7 °C)  Pulse:  [55-83] 75  Resp:  [11-20] 19  SpO2:  [92 %-98 %] 96 %  BP: (127-164)/(62-95) 143/86     Weight: 99.3 kg (218 lb 14.7 oz)  Body mass index is 28.11 kg/m².    Intake/Output Summary (Last 24 hours) at 4/28/2022 1752  Last data filed at 4/28/2022 1330  Gross per 24 hour   Intake 748.33 ml   Output --   Net 748.33 ml      Physical Exam  Vitals and nursing note reviewed.   Constitutional:       General: He is not in acute distress.     Appearance: He is not ill-appearing.   HENT:      Head: Normocephalic and atraumatic.      Nose: Nose normal.      Mouth/Throat:      Mouth: Mucous membranes are moist.   Eyes:      Extraocular Movements: Extraocular movements intact.      Pupils: Pupils are equal, round, and reactive to light.   Cardiovascular:      Rate and Rhythm: Normal rate and regular rhythm.   Pulmonary:      Effort: Pulmonary effort is normal.      Breath sounds: Normal breath sounds.   Abdominal:      General: Bowel sounds are normal. There is no distension.      Tenderness: There is no abdominal tenderness. There is no guarding or rebound.   Musculoskeletal:          General: Normal range of motion.      Cervical back: Normal range of motion and neck supple.   Skin:     General: Skin is warm.   Neurological:      Mental Status: He is alert and oriented to person, place, and time.   Psychiatric:      Comments: Mildly dulled affect       Significant Labs: All pertinent labs within the past 24 hours have been reviewed.  Recent Lab Results         04/28/22  0439        Albumin 3.5       Alkaline Phosphatase 127       ALT 36       Anion Gap 9       AST 31       Baso # 0.04       Basophil % 0.5       BILIRUBIN TOTAL 0.4  Comment: For infants and newborns, interpretation of results should be based  on gestational age, weight and in agreement with clinical  observations.    Premature Infant recommended reference ranges:  Up to 24 hours.............<8.0 mg/dL  Up to 48 hours............<12.0 mg/dL  3-5 days..................<15.0 mg/dL  6-29 days.................<15.0 mg/dL         BUN 9       Calcium 8.6       Chloride 102       CO2 32       Creatinine 1.3       Differential Method Automated       eGFR if  >60.0       eGFR if non  >60.0  Comment: Calculation used to obtain the estimated glomerular filtration  rate (eGFR) is the CKD-EPI equation.          Eos # 0.4       Eosinophil % 4.6       Glucose 95       Gran # (ANC) 5.0       Gran % 65.0       Hematocrit 38.2       Hemoglobin 12.7       Immature Grans (Abs) 0.02  Comment: Mild elevation in immature granulocytes is non specific and   can be seen in a variety of conditions including stress response,   acute inflammation, trauma and pregnancy. Correlation with other   laboratory and clinical findings is essential.         Immature Granulocytes 0.3       Lymph # 1.6       Lymph % 20.4       MCH 29.1       MCHC 33.2       MCV 87       Mono # 0.7       Mono % 9.2       MPV 9.8       nRBC 0       Platelets 311       Potassium 3.8       PROTEIN TOTAL 6.8       RBC 4.37       RDW 12.2       Sodium 143        Valproic Acid Lvl 45.6  Comment: Valproic Acid (ug/ml)  Toxic:   >100.0 ug/ml         WBC 7.68               Significant Imaging: I have reviewed all pertinent imaging results/findings within the past 24 hours.

## 2022-04-28 NOTE — ASSESSMENT & PLAN NOTE
None noted this date on exam  Neurology feels that the patient should remain in ICU for 1 more day    None noted on depakote

## 2022-04-28 NOTE — PROGRESS NOTES
[facility] Medicine  Progress Note    Patient Name: Iván Amaral  MRN: [id number]  Patient Class: IP- Inpatient   Admission Date: 4/24/2022  Length of Stay: 4 days  Attending Physician: Jakob Cadena MD  Primary Care Provider: Hugo Moran MD        Subjective:     Principal Problem:Meningitis        HPI:  No notes on file    Overview/Hospital Course:  4/26/2022  Mr Corral has had right sided rhythmic movements with sleep. His throat is sore post extubation    4/27/2022  I am OK with no complaints save fatigue and weakness. No seizure like activity per mother. Familial A 1 antitrypsin disease with variable penetrance    4/28/2022  Mr Amaral is much more alert and awake with no seizure activity. He walked from ICU to [facility].      Interval History: Mr Amaral has no seizure activity, is alert awake and responds to queries    Review of Systems   Constitutional:  Positive for diaphoresis and fatigue.   HENT: Negative.     Eyes: Negative.    Respiratory: Negative.     Cardiovascular: Negative.    Gastrointestinal: Negative.    Endocrine: Negative.    Genitourinary: Negative.    Musculoskeletal:  Positive for myalgias.   Skin: Negative.    Allergic/Immunologic: Negative.    Neurological:  Positive for weakness.   Hematological: Negative.    Psychiatric/Behavioral:  The patient is nervous/anxious.    Objective:     Vital Signs (Most Recent):  Temp: 98 °F (36.7 °C) (04/28/22 1500)  Pulse: 75 (04/28/22 1500)  Resp: 19 (04/28/22 1500)  BP: (!) 143/86 (04/28/22 1500)  SpO2: 96 % (04/28/22 1500)   Vital Signs (24h Range):  Temp:  [98 °F (36.7 °C)-98.5 °F (36.9 °C)] 98 °F (36.7 °C)  Pulse:  [55-83] 75  Resp:  [11-20] 19  SpO2:  [92 %-98 %] 96 %  BP: (127-164)/(62-95) 143/86     Weight: 99.3 kg (218 lb 14.7 oz)  Body mass index is 28.11 kg/m².    Intake/Output Summary (Last 24 hours) at 4/28/2022 1752  Last data filed at 4/28/2022 1330  Gross per 24 hour   Intake 748.33 ml   Output --    Net 748.33 ml      Physical Exam  Vitals and nursing note reviewed.   Constitutional:       General: He is not in acute distress.     Appearance: He is not ill-appearing.   HENT:      Head: Normocephalic and atraumatic.      Nose: Nose normal.      Mouth/Throat:      Mouth: Mucous membranes are moist.   Eyes:      Extraocular Movements: Extraocular movements intact.      Pupils: Pupils are equal, round, and reactive to light.   Cardiovascular:      Rate and Rhythm: Normal rate and regular rhythm.   Pulmonary:      Effort: Pulmonary effort is normal.      Breath sounds: Normal breath sounds.   Abdominal:      General: Bowel sounds are normal. There is no distension.      Tenderness: There is no abdominal tenderness. There is no guarding or rebound.   Musculoskeletal:         General: Normal range of motion.      Cervical back: Normal range of motion and neck supple.   Skin:     General: Skin is warm.   Neurological:      Mental Status: He is alert and oriented to person, place, and time.   Psychiatric:      Comments: Mildly dulled affect       Significant Labs: All pertinent labs within the past 24 hours have been reviewed.  Recent Lab Results         04/28/22  0439        Albumin 3.5       Alkaline Phosphatase 127       ALT 36       Anion Gap 9       AST 31       Baso # 0.04       Basophil % 0.5       BILIRUBIN TOTAL 0.4  Comment: For infants and newborns, interpretation of results should be based  on gestational age, weight and in agreement with clinical  observations.    Premature Infant recommended reference ranges:  Up to 24 hours.............<8.0 mg/dL  Up to 48 hours............<12.0 mg/dL  3-5 days..................<15.0 mg/dL  6-29 days.................<15.0 mg/dL         BUN 9       Calcium 8.6       Chloride 102       CO2 32       Creatinine 1.3       Differential Method Automated       eGFR if  >60.0       eGFR if non  >60.0  Comment: Calculation used to obtain the  estimated glomerular filtration  rate (eGFR) is the CKD-EPI equation.          Eos # 0.4       Eosinophil % 4.6       Glucose 95       Gran # (ANC) 5.0       Gran % 65.0       Hematocrit 38.2       Hemoglobin 12.7       Immature Grans (Abs) 0.02  Comment: Mild elevation in immature granulocytes is non specific and   can be seen in a variety of conditions including stress response,   acute inflammation, trauma and pregnancy. Correlation with other   laboratory and clinical findings is essential.         Immature Granulocytes 0.3       Lymph # 1.6       Lymph % 20.4       MCH 29.1       MCHC 33.2       MCV 87       Mono # 0.7       Mono % 9.2       MPV 9.8       nRBC 0       Platelets 311       Potassium 3.8       PROTEIN TOTAL 6.8       RBC 4.37       RDW 12.2       Sodium 143       Valproic Acid Lvl 45.6  Comment: Valproic Acid (ug/ml)  Toxic:   >100.0 ug/ml         WBC 7.68               Significant Imaging: I have reviewed all pertinent imaging results/findings within the past 24 hours.      Assessment/Plan:      Meningococcal encephalitis  Under treatment  Case discussed with neurology    Reduced dysphoria and weakness. Much more alert and responsive      Seizures  None noted this date on exam  Neurology feels that the patient should remain in ICU for 1 more day    None noted on depakote      Meningoencephalitis  Mr Amaral is much more alert and awake . He walked a considerable distance to his bed on cardio A  He is responding to treatment with Depakote  We will continue Acyclovir until HVZ CSF study returns        VTE Risk Mitigation (From admission, onward)         Ordered     IP VTE HIGH RISK PATIENT  Once         04/24/22 2316     Place sequential compression device  Until discontinued         04/24/22 2316                Discharge Planning   STANISLAW:      Code Status: Full Code   Is the patient medically ready for discharge?:     Reason for patient still in hospital (select all that apply): Treatment, Consult  recommendations and Pending disposition  Discharge Plan A: Rehab   Discharge Delays: None known at this time              Jakob Cadena MD  Department of Hospital Medicine   [facility]

## 2022-04-28 NOTE — ASSESSMENT & PLAN NOTE
Mr Amaral is much more alert and awake . He walked a considerable distance to his bed on cardio A  He is responding to treatment with Depakote  We will continue Acyclovir until HVZ CSF study returns

## 2022-04-28 NOTE — PLAN OF CARE
Problem: Infection  Goal: Absence of Infection Signs and Symptoms  Outcome: Ongoing, Progressing     Problem: Adult Inpatient Plan of Care  Goal: Plan of Care Review  Outcome: Ongoing, Progressing  Goal: Patient-Specific Goal (Individualized)  Outcome: Ongoing, Progressing  Goal: Absence of Hospital-Acquired Illness or Injury  Outcome: Ongoing, Progressing  Goal: Optimal Comfort and Wellbeing  Outcome: Ongoing, Progressing  Goal: Readiness for Transition of Care  Outcome: Ongoing, Progressing     Problem: Skin and Tissue Injury (Artificial Airway)  Goal: Absence of Device-Related Skin or Tissue Injury  Outcome: Ongoing, Progressing     Problem: Skin Injury Risk Increased  Goal: Skin Health and Integrity  Outcome: Ongoing, Progressing     Problem: Fall Injury Risk  Goal: Absence of Fall and Fall-Related Injury  Outcome: Ongoing, Progressing     Problem: Oral Intake Inadequate  Goal: Improved Oral Intake  Outcome: Ongoing, Progressing

## 2022-04-28 NOTE — PT/OT/SLP PROGRESS
Occupational Therapy   Treatment    Name: Iván Amaral  MRN: [id number]  Admitting Diagnosis:  Meningitis       Recommendations:     Discharge Recommendations: home  Discharge Equipment Recommendations:  none  Barriers to discharge:  None    Assessment:     Iván Amaral is a 37 y.o. male with a medical diagnosis of Meningitis.  Pt agreeable to OT therapy session this AM. Performance deficits affecting function are pain, impaired cardiopulmonary response to activity. Pt met goals and is no longer appropriate for OT therapy services. Will d/c from OT.     Rehab Prognosis:  Good; patient would benefit from acute skilled OT services to address these deficits and reach maximum level of function.       Plan:     Patient to be seen 3 x/week to address the above listed problems via self-care/home management, therapeutic activities, therapeutic exercises  · Plan of Care Expires: 05/27/22  · Plan of Care Reviewed with: patient, mother    Subjective     Pain/Comfort:  · Pain Rating 1: 0/10    Objective:     Communicated with: nursing prior to session.  Patient found HOB elevated with telemetry, pulse ox (continuous), peripheral IV upon OT entry to room.    General Precautions: Standard, fall   Orthopedic Precautions:N/A   Braces: N/A  Respiratory Status: Room air     Occupational Performance:     Bed Mobility:    · Patient completed Supine to Sit with modified independence  · Patient completed Sit to Supine with modified independence     Functional Mobility/Transfers:  · Patient completed Sit <> Stand Transfer with supervision  with  no assistive device   · Patient completed Toilet Transfer Step Transfer technique with stand by assistance with  no AD  · Functional Mobility: pt amb around room with no AD, no LOB, no SOB    Activities of Daily Living:  · Grooming: supervision and stand by assistance standing at sink to brush teeth  · Upper Body Dressing: independence simulated   · Toileting: supervision from  toilet simulated    [facility] 6 Click ADL: 24    Treatment & Education:  Pt educated on role of OT/POC, importance of OOB/EOB activity, use of call bell, and safety during ADLs, transfers, and functional mobility    Patient left up in chair with all lines intact, call button in reach and mother presentEducation:      GOALS:   Multidisciplinary Problems     Occupational Therapy Goals     Not on file          Multidisciplinary Problems (Resolved)        Problem: Occupational Therapy    Goal Priority Disciplines Outcome Interventions   Occupational Therapy Goal   (Resolved)     OT, PT/OT Met    Description: Goals to be met by: discharge    Patient will increase functional independence with ADLs by performing:    UE Dressing with Supervision.  LE Dressing with Supervision.  Grooming while standing at sink with Supervision.  Toileting from toilet with Supervision for hygiene and clothing management.   Toilet transfer to toilet with Supervision.                     Time Tracking:     OT Date of Treatment: 04/28/22  OT Start Time: 0901  OT Stop Time: 0909  OT Total Time (min): 8 min    Billable Minutes:Self Care/Home Management 8    OT/LISETH: OT          4/28/2022

## 2022-04-28 NOTE — PT/OT/SLP DISCHARGE
Occupational Therapy Discharge Summary    Iván Amaral  MRN: [id number]   Principal Problem: Meningitis      Patient Discharged from acute Occupational Therapy on 4/28/22.  Please refer to prior OT note dated 4/28/22 for functional status.    Assessment:      Patient has met all goals and is not appropriate for therapy.    Objective:     GOALS:   Multidisciplinary Problems     Occupational Therapy Goals     Not on file          Multidisciplinary Problems (Resolved)        Problem: Occupational Therapy    Goal Priority Disciplines Outcome Interventions   Occupational Therapy Goal   (Resolved)     OT, PT/OT Met    Description: Goals to be met by: discharge    Patient will increase functional independence with ADLs by performing:    UE Dressing with Supervision.  LE Dressing with Supervision.  Grooming while standing at sink with Supervision.  Toileting from toilet with Supervision for hygiene and clothing management.   Toilet transfer to toilet with Supervision.                     Reasons for Discontinuation of Therapy Services  Satisfactory goal achievement.      Plan:     Patient Discharged to: remains in hospital    4/28/2022

## 2022-04-28 NOTE — PLAN OF CARE
Problem: Occupational Therapy  Goal: Occupational Therapy Goal  Description: Goals to be met by: discharge    Patient will increase functional independence with ADLs by performing:    UE Dressing with Supervision.  LE Dressing with Supervision.  Grooming while standing at sink with Supervision.  Toileting from toilet with Supervision for hygiene and clothing management.   Toilet transfer to toilet with Supervision.    Outcome: Met

## 2022-04-28 NOTE — PLAN OF CARE
Problem: Oral Intake Inadequate  Goal: Improved Oral Intake  Outcome: Ongoing, Progressing  Intervention: Promote and Optimize Oral Intake  Flowsheets (Taken 4/28/2022 1129)  Oral Nutrition Promotion: calorie-dense liquids provided

## 2022-04-28 NOTE — PROGRESS NOTES
"[facility]  Adult Nutrition   Progress Note (Follow-Up)    SUMMARY     Recommendations/Interventions:    Recommendation/Intervention: 1. Continue current diet as tolerated, encourage intake. 2. Added Ensure Enlive TID (1050 kcals, 60 g protein) to assist in meeting needs when meal intake is insufficient. 3.  to assist in meal choices daily.  Goals: 1. Blood glucose and electrolytes monitored and managed. 2. Patient to meet at least 75% of estimated needs through meal and supplement intake.  Nutrition Goal Status: progressing towards goal    Dietitian Rounds Brief:  · Follow-up. Appetite and intake varies. He consumed a sandwich for lunch yesterday and did not experience N/V. He ate a bite of ground meat last night for dinner and vomited after this. Minimal intake this morning-offered ONS-patient accepted. Will continue to monitor intake, labs, and plan of care.  Reason for Assessment  Reason For Assessment: RD follow-up  Diagnosis:  (meningitis)  Relevant Medical History: serotonin syndrome, HLD, HLD, anxiety/depression  Interdisciplinary Rounds: attended    Nutrition Risk Screen  Nutrition Risk Screen: no indicators present     MST Score: 2  Have you recently lost weight without trying?: Unsure  Weight loss score: 2  Have you been eating poorly because of a decreased appetite?: No  Appetite score: 0     Nutrition/Diet History  Spiritual, Cultural Beliefs, Taoist Practices, Values that Affect Care: no  Food Allergies: NKFA  Factors Affecting Nutritional Intake: None identified at this time    Anthropometrics  Temp: 98.3 °F (36.8 °C)  Height Method: Measured  Height: 6' 2" (188 cm)  Height (inches): 74 in  Weight Method: Bed Scale  Weight: 99.3 kg (218 lb 14.7 oz)  Weight (lb): 218.92 lb  Ideal Body Weight (IBW), Male: 190 lb  % Ideal Body Weight, Male (lb): 115.22 %  BMI (Calculated): 28.1  BMI Grade: 25 - 29.9 - overweight     Weight History:  Wt Readings from Last 10 Encounters:   04/25/22 " 99.3 kg (218 lb 14.7 oz)   04/18/22 102.2 kg (225 lb 5 oz)   04/19/22 102.1 kg (225 lb)   03/22/22 106.2 kg (234 lb 1.6 oz)   01/14/22 106.1 kg (233 lb 12.8 oz)   06/11/21 99 kg (218 lb 4.8 oz)   03/01/21 98.4 kg (217 lb)   12/07/20 97.3 kg (214 lb 8 oz)   05/19/20 99 kg (218 lb 4.8 oz)   02/04/20 98.7 kg (217 lb 8 oz)     Lab/Procedures/Meds: Pertinent Labs Reviewed  Clinical Chemistry:  Recent Labs   Lab 04/25/22  0343 04/26/22  0501 04/28/22  0439    132* 143   K 4.1 3.8 3.8    103 102   CO2 24 27 32*   * 166* 95   BUN 13 13 9   CREATININE 1.3 1.1 1.3   CALCIUM 9.1 7.9* 8.6*   PROT 7.5 6.4 6.8   ALBUMIN 4.4 3.5 3.5   BILITOT 0.5 0.8 0.4   ALKPHOS 132 104 127   AST 31 39 31   ALT 25 24 36   ANIONGAP 11 2* 9   ESTGFRAFRICA >60.0 >60.0 >60.0   EGFRNONAA >60.0 >60.0 >60.0   MG 2.5 2.3  --    PHOS 3.6 4.1  --     < > = values in this interval not displayed.     CBC:   Recent Labs   Lab 04/28/22  0439   WBC 7.68   RBC 4.37*   HGB 12.7*   HCT 38.2*      MCV 87   MCH 29.1   MCHC 33.2     Lipid Panel:  Recent Labs   Lab 04/25/22  0343   TRIG 257*     Cardiac Profile:  Recent Labs   Lab 04/24/22  1911 04/24/22  2216   BNP 76  --    CPK  --  592*  592*   TROPONINI <0.030 <0.030     Inflammatory Labs:  Recent Labs   Lab 04/25/22  0343   CRP 0.55     Diabetes:  Recent Labs   Lab 04/25/22  0009   HGBA1C 5.2     Medications: Pertinent Medications reviewed  Scheduled Meds:   acyclovir  10 mg/kg (Ideal) Intravenous Q8H    cefTRIAXone (ROCEPHIN) IVPB  2 g Intravenous Q24H    chlorhexidine  15 mL Mouth/Throat BID    divalproex  500 mg Oral Q8H    mupirocin   Nasal BID    pantoprazole  40 mg Intravenous Daily     Continuous Infusions:  PRN Meds:.acetaminophen, albuterol-ipratropium, calcium chloride IVPB, calcium chloride IVPB, calcium chloride IVPB, dextrose 10%, dextrose 10%, dextrose 50%, dextrose 50%, glucagon (human recombinant), glucose, glucose, hydrALAZINE, hydrALAZINE, lorazepam, lorazepam,  magnesium oxide, magnesium sulfate IVPB, magnesium sulfate IVPB, magnesium sulfate IVPB, magnesium sulfate IVPB, naloxone, ondansetron, potassium chloride in water, potassium chloride in water, potassium chloride in water, potassium chloride in water, potassium chloride, potassium chloride, potassium chloride, potassium chloride, promethazine (PHENERGAN) IVPB, sodium chloride 0.9%, sodium phosphate IVPB, sodium phosphate IVPB, sodium phosphate IVPB, sodium phosphate IVPB, sodium phosphate IVPB    Antibiotics (From admission, onward)            Start     Stop Route Frequency Ordered    04/27/22 2046  cefTRIAXone (ROCEPHIN) 2 g/50 mL D5W IVPB         -- IV Every 24 hours (non-standard times) 04/27/22 0808    04/25/22 0945  mupirocin 2 % ointment  (MRSA Decolonization Orders [facility])         04/30 0859 Nasl 2 times daily 04/25/22 0844        Estimated/Assessed Needs  Weight Used For Calorie Calculations: 99.3 kg (218 lb 14.7 oz)  Energy Calorie Requirements (kcal): 5061-9346 kcals/day (20-25 kcals/kg)  Energy Need Method: Kcal/kg  Protein Requirements: 119-150 g/day (1.2-1.5 g/kg)  Weight Used For Protein Calculations: 99.3 kg (218 lb 14.7 oz)  Fluid Requirements (mL): 1 mL/kcal or per MD  RDA Method (mL): 1986    Nutrition Prescription Ordered    Current Diet Order: Adult Regular (IDDSI Level 7)    Evaluation of Received Nutrient/Fluid Intake    Other Calories (kcal): 0  IV Fluid (mL): 2400 (0.9% NaCl @ 100 mL/hr)  Energy Calories Required: not meeting needs  Protein Required: not meeting needs  Fluid Required: meeting needs  Tolerance: tolerating  % Intake of Estimated Energy Needs: 25 - 50 %  % Meal Intake: 25 - 50 %    Intake/Output Summary (Last 24 hours) at 4/28/2022 0738  Last data filed at 4/28/2022 0600  Gross per 24 hour   Intake 1935 ml   Output --   Net 1935 ml      Nutrition Risk    Level of Risk/Frequency of Follow-up: moderate - high   Monitor and Evaluation    Food and Nutrient Intake: energy intake, food  and beverage intake  Food and Nutrient Adminstration: diet order  Physical Activity and Function: nutrition-related ADLs and IADLs, factors affecting access to physical activity  Anthropometric Measurements: weight, weight change, body mass index  Biochemical Data, Medical Tests and Procedures: electrolyte and renal panel, gastrointestinal profile, lipid profile, inflammatory profile, glucose/endocrine profile  Nutrition-Focused Physical Findings: overall appearance     Nutrition Follow-Up    RD Follow-up?: Yes  Mary Jane Seymour RD 04/28/2022 11:28 AM

## 2022-04-28 NOTE — NURSING TRANSFER
Nursing Transfer Note      4/28/2022     Reason patient is being transferred: Step down      Transfer To: 2535    Transfer via wheelchair    Transfer with cardiac monitoring    Transported by ANGIE Garcia RN    Medicines sent: Yes    Any special needs or follow-up needed: No    Chart send with patient: Yes    Notified: Mo at bedside    Patient reassessed at: *** (date, time)    Upon arrival to floor: cardiac monitor applied, patient oriented to room, call bell in reach and bed in lowest position

## 2022-04-28 NOTE — PROGRESS NOTES
Consult Note  Infectious Disease    Reason for Consult:  Meningitis    HPI: Iván Amaral is a 37 y.o. male Was recently hospitalized as 4/18 until 4/19 with transient numbness, tingling of the right hand with clumsiness, right leg weakness and right-sided facial weakness.  CT scan of the head done in the emergency room showed a possible small subarachnoid hemorrhage.  He was seen by Neurology and had an MRI suggesting no subarachnoid hemorrhage.  He was placed on lipid-lowering agent, aspirin has for TIA and released.  He was brought back to the emergency room yesterday by EMS because of unresponsive state, staring to the left lasting approximately 45-60 seconds with subsequent difficulty swallowing and with expressive aphasia.  The ER record states that family noted him to be hyperventilating and diaphoretic.  The patient apparently stated that he took 2 Prozac pills and in the emergency room he was tachycardic and hypertensive.  Repeat CT head showed no acute intracranial pathology but underlying paranasal sinus disease.  History and physical states that mother reported recurrence of headache with paresthesias on 04/20 and these resolved as well.  His mother who is a nurse had came to stay with him, and noted a lingering cough and the patient did have an upper respiratory infection a few weeks ago and his roommate recently had COVID.  Mother also reported to admitting physician that his home environment is unclean, there are cats, mold and trash everywhere.  In the emergency room he was intubated/sedated due to erratic and unsafe behavior, a lumbar puncture was performed demonstrating 2900 red blood cells and 225 white blood cells 88% lymphocytes with a very elevated protein of 186. .  Serotonin syndrome was suspected as well.  He was begun on ceftriaxone vancomycin and  IV acyclovir and admitted to the intensive care unit    Lactic acid was 14, white blood cells 17,000, creatinine 1.5, glucose 213, CPK  was 592  He has no fever and WBC are improved, heart rate is less than 100, BP still elevated, seem to have some posturing. HIV negative 2020.     4/26: interim reviewed. Visited yesterday afternoon and he was extubated and mother at bedside. CSF PCR panel was negative for all. CSF culture is negative so far. Respiratory viral panel(NP) was positive for parainfluenza 3. This is not included in the CSF pcr panel. Parainfluenza can cause meningitis. No fever, WBC improved. CXR with trace atelectasis left base. Only normal gunjan on sputum culture and urine and blood cultures negative. . He is alert and interactive. He denies headache now and describes chronic frontal headaches, which were worse recently. Mother states privately that he is not at baseline personality and is more angry and aggressive and this is much different than usual. He desires mejia out and diet and will order.   4/27: interim reviewed. Afebrile., started on AEDs due to abnormal EEG and focal physical movements, localizing to frontal and not temporal. MRI of the brain was unremarkable. CSF culture is negative. HIV Ab/Ag negative. Multiple serologies pending. He has mild frontal headache, mild generalized aches and pains. Minimal sinus congestion and cough. Voiding without difficulty. Brother at bedside. Discussed available results from studies.   4/28:  Interim reviewed.  Repeat EEG was apparently improved.  Still having some behavioral issues some unpredictable behavior.  Herpes PCR, West Nile antibody, cryptococcal antigen, arbovirus antibodies are still pending. RPR is nonreactive.  Left labs are stable.he has more frontal headache today. May have a little photophobia.Mother states he is still having some short term memory lapse. He is currently up in the chair. He also endorses some clumsiness with texting on his phone. No significant balance issues, speech or bowel/bladder.      EXAM & DIAGNOSTICS REVIEWED:   Vitals:     Temp:  [98 °F (36.7  °C)-98.4 °F (36.9 °C)]   Temp: 98.3 °F (36.8 °C) (04/28/22 0400)  Pulse: 69 (04/28/22 0500)  Resp: 18 (04/28/22 0400)  BP: (!) 158/90 (04/28/22 0500)  SpO2: (!) 92 % (04/28/22 0500)    Intake/Output Summary (Last 24 hours) at 4/28/2022 0759  Last data filed at 4/28/2022 0600  Gross per 24 hour   Intake 1935 ml   Output --   Net 1935 ml       General:  Alert and cooperative, up in the chair, attentive but looks uncomfortable, like with headache  Eyes:  Anicteric, EOMI  ENT:  Healing bite trauma to the left side of the tongue dentition is very good  Neck:  Completely supple, no masses or adenopathy appreciated  Lungs: clear  Heart:  RRR, no gallop/murmur/rub noted  Abd:  Soft, NT, ND, normal BS, no masses or organomegaly appreciated.  No perirectal or perianal lesions  :  voiding  Musc:  Joints without effusion, swelling, erythema, synovitis, muscle wasting.   Skin:  No rashes. No palmar or plantar lesions. No subungual petechiae. No track marks  Neuro: Alert , face symmetric, speech is fluent, no focal motor deficit. mentating well.   Psych:   Calm and cooperative.  He is a little defensive when his mother points out his cognitive infections.  Lymphatic:        Extrem: No edema, erythema, phlebitis, cellulitis, warm and well perfused  VAD:   Right arm picc    Isolation:  none  Wound: none         General Labs reviewed:  Recent Labs   Lab 04/26/22  0501 04/27/22 0452 04/28/22 0439   WBC 8.89 8.71 7.68   HGB 12.0* 12.0* 12.7*   HCT 36.1* 34.8* 38.2*    272 311       Recent Labs   Lab 04/26/22  0501 04/27/22 0452 04/28/22 0439   * 143 143   K 3.8 3.6 3.8    107 102   CO2 27 28 32*   BUN 13 12 9   CREATININE 1.1 1.4 1.3   CALCIUM 7.9* 8.5* 8.6*   PROT 6.4 6.4 6.8   BILITOT 0.8 0.4 0.4   ALKPHOS 104 107 127   ALT 24 28 36   AST 39 36 31     Recent Labs   Lab 04/25/22  0343   CRP 0.55        Latest Reference Range & Units 04/24/22 22:00   COLOR CSF Colorless  Red !   Heme Aliquot mL 3.0    Appearance, CSF Clear  Bloody !   RBC, CSF 0 /cu mm 2965 !   WBC, CSF 0 - 5 /cu mm 225 (HH) [1]   Segmented Neutrophils, CSF 0 - 6 % 2   Lymphs, CSF 40 - 80 % 88 (H)   Mono/Macrophage, CSF 15 - 45 % 10 (L)   Glucose, CSF 40 - 70 mg/dL 80 (H) [2]   Protein, CSF 15 - 40 mg/dL 186 (H) [3]       Micro:  Microbiology Results (last 7 days)     Procedure Component Value Units Date/Time    Blood culture [[id number]] Collected: 04/24/22 1920    Order Status: Completed Specimen: Blood from Peripheral, Antecubital, Right Updated: 04/27/22 2032     Blood Culture, Routine No Growth to date      No Growth to date      No Growth to date      No Growth to date    Blood culture [[id number]] Collected: 04/24/22 1925    Order Status: Completed Specimen: Blood from Peripheral, Antecubital, Right Updated: 04/27/22 2032     Blood Culture, Routine No Growth to date      No Growth to date      No Growth to date      No Growth to date    CSF culture and Gram Stain (Tube 2) [[id number]] Collected: 04/24/22 2200    Order Status: Completed Specimen: CSF (Spinal Fluid) from CSF Tap, Tube 2 Updated: 04/27/22 0914     CSF CULTURE No Growth to date     Gram Stain Result Few WBC's      No organisms seen    Narrative:      On which sequentially labeled tube should this analysis be  performed?->2    Culture, Respiratory with Gram Stain [[id number]] Collected: 04/25/22 0000    Order Status: Completed Specimen: Respiratory from Endotracheal Aspirate Updated: 04/27/22 0854     Respiratory Culture Normal respiratory gunjan     Gram Stain (Respiratory) <10 epithelial cells per low power field.     Gram Stain (Respiratory) Many WBC's     Gram Stain (Respiratory) Many Gram positive cocci     Gram Stain (Respiratory) Few Gram negative rods    Urine Culture High Risk [[id number]] Collected: 04/25/22 0143    Order Status: Completed Specimen: Urine, Catheterized Updated: 04/27/22 0743     Urine Culture, Routine No growth to date    Narrative:      Indicated  criteria for high risk culture:->Other  Other (specify):->HIGH RISK    Respiratory Infection Panel (PCR), Nasopharyngeal [[id number]]  (Abnormal) Collected: 04/25/22 0347    Order Status: Completed Updated: 04/25/22 1505     Respiratory Infection Panel Source NP swab     Adenovirus Not Detected     Coronavirus 229E, Common Cold Virus Not Detected     Coronavirus HKU1, Common Cold Virus Not Detected     Coronavirus NL63, Common Cold Virus Not Detected     Coronavirus OC43, Common Cold Virus Not Detected     Comment: The Coronavirus strains detected in this test cause the common cold.  These strains are not the COVID-19 (novel Coronavirus)strain   associated with the respiratory disease outbreak.          SARS-CoV2 (COVID-19) Qualitative PCR Not Detected     Human Metapneumovirus Not Detected     Human Rhinovirus/Enterovirus Not Detected     Influenza A (subtypes H1, H1-2009,H3) Not Detected     Influenza B Not Detected     Parainfluenza Virus 1 Not Detected     Parainfluenza Virus 2 Not Detected     Parainfluenza Virus 3 Detected     Parainfluenza Virus 4 Not Detected     Respiratory Syncytial Virus Not Detected     Bordetella Parapertussis (YV1658) Not Detected     Bordetella pertussis (ptxP) Not Detected     Chlamydia pneumoniae Not Detected     Mycoplasma pneumoniae Not Detected     Comment: Respiratory Infection Panel testing performed by Multiplex PCR.       Narrative:      Specimen Source->Nasopharyngeal Swab    Fungus culture [[id number]] Collected: 04/24/22 2200    Order Status: No result Specimen: CSF (Spinal Fluid) Updated: 04/25/22 0814    Gladys Ink (CSF) [[id number]] Collected: 04/24/22 2200    Order Status: Completed Specimen: CSF (Spinal Fluid) Updated: 04/25/22 0805     Gladys Ink No encapsulated yeast seen    Fungus culture [[id number]]     Order Status: No result Specimen: CSF (Spinal Fluid) from CSF Tap, Tube 1     Cryptococcal antigen [[id number]]     Order Status: Completed Specimen: Blood, Venous      Gladys Ink (CSF) [[id number]]     Order Status: Completed Specimen: CSF (Spinal Fluid) from CSF Tap, Tube 1     MRSA Screen by PCR [[id number]] Collected: 04/25/22 0347    Order Status: Completed Specimen: Nasopharyngeal Swab from Nasal Updated: 04/25/22 0559     MRSA SCREEN BY PCR Negative    Group A Strep, Molecular [[id number]] Collected: 04/25/22 0347    Order Status: Completed Specimen: Throat Updated: 04/25/22 0459     Group A Strep, Molecular Negative     Comment: Arcanobacterium haemolyticum and Beta Streptococcus group C   and G will not be detected by this test method.  Please order   Throat Culture ([id number]) if suspected.         Resp Viral Panel PCR, Peds Under 7 Months Nasopharyngeal Swab [[id number]] Collected: 04/25/22 0347    Order Status: Canceled           Imaging Reviewed:   CXR   CT head   IMPRESSION:  1. No acute intracranial pathology.  2. Underlying paranasal sinus disease.    Cardiology:  TTE 4/19  · The left ventricle is normal in size with concentric remodeling and normal systolic function.  · The estimated ejection fraction is 65%.  · Normal left ventricular diastolic function.  · There is no evidence of intracardiac shunting. Bubble study negative for PFO  · Atrial fibrillation not observed.  · Normal right ventricular size with normal right ventricular systolic function.  · Normal central venous pressure (3 mmHg).  · The estimated PA systolic pressure is 11 mmHg.         IMPRESSION & PLAN   1. Meningoencephalitis   Cell count/diff suggests non bacterial etiology   HIV negative 2020 makes fungal and opportunistic meningoencephalitis much less likely   Negative CSF pcr panel, additional serologies pending   Behavioral/personality changes, apparently for a while prior to illness   Abnormal EEG, started on AED    2. Recent TIA symptoms, negative MRI, likely prodrome for this illness  3. Paranasal sinus disease, more on 4/24 CT than last week   Parainfluenza 3 positive on nasal pcr            Recommendations:  Continue  Acyclovir,  Rocephin q24 for sinuses  Awaiting cryptococcal antigen , arbovirus antibody panel, West Nile Ab, herpes simplex PCR   maintain good hydration     D/w patient, mother nursing  No objection to moving out of ICU.  Mother has questions about disposition, rehab etc.      [phone] mother       Medical Decision Making during this encounter was  [_] Low Complexity  [_] Moderate Complexity  [ xxx ] High Complexity

## 2022-04-28 NOTE — CARE UPDATE
04/27/22 2201   Patient Assessment/Suction   Respiratory Effort Unlabored   All Lung Fields Breath Sounds clear   Rhythm/Pattern, Respiratory pattern regular   PRE-TX-O2   O2 Device (Oxygen Therapy) nasal cannula   Flow (L/min) 2   SpO2 95 %   Pulse Oximetry Type Continuous   Pulse 73   Resp 16   /76   Aerosol Therapy   $ Aerosol Therapy Charges PRN treatment not required   Respiratory Evaluation   $ Care Plan Tech Time 15 min   $ Eval/Re-eval Charges Re-evaluation   Evaluation For Re-Eval 3 day

## 2022-04-28 NOTE — PT/OT/SLP PROGRESS
Physical Therapy Treatment    Patient Name:  Iván Amaral   MRN:  [id number]    Recommendations:     Discharge Recommendations:  home health PT (d/c to mother's home)   Discharge Equipment Recommendations: none   Barriers to discharge: increased assist with mobility    Assessment:     Iván Amaral is a 37 y.o. male admitted with a medical diagnosis of Meningitis.  He presents with the following impairments/functional limitations:  weakness, impaired endurance, impaired self care skills, impaired functional mobilty, gait instability, impaired balance, decreased lower extremity function, decreased safety awareness, impaired cardiopulmonary response to activity, pain.  Pt agreeable to visit. Pt willing to ambulate in the hallway. Pt able to ambulate 290' with no AD which is an improvement from yesterday. Pt noted to be easily distracted when ambulating and required verbal cuing to avoid obstacles. Pt tolerated session well.    Rehab Prognosis: Fair; patient would benefit from acute skilled PT services to address these deficits and reach maximum level of function.    Recent Surgery: * No surgery found *      Plan:     During this hospitalization, patient to be seen 3 x/week to address the identified rehab impairments via gait training, therapeutic activities, therapeutic exercises, neuromuscular re-education and progress toward the following goals:    · Plan of Care Expires:  05/27/22    Subjective     Chief Complaint: wanting to take a shower  Patient/Family Comments/goals: return home  Pain/Comfort:  · Pain Rating 1: 0/10      Objective:     Communicated with RN prior to session.  Patient found HOB elevated with peripheral IV, telemetry, blood pressure cuff, pulse ox (continuous) upon PT entry to room.     General Precautions: Standard, fall   Orthopedic Precautions:N/A   Braces: N/A  Respiratory Status: Room air     Functional Mobility:  · Bed Mobility:     · Supine to Sit: supervision  · Transfers:      · Sit to Stand:  supervision with no AD  · Gait: x 290' no AD with CGA>SBA with VC for obstacles.      AM-PAC 6 CLICK MOBILITY          Therapeutic Activities and Exercises:   Pt educated on importance of time OOB, importance of intermittent mobility, safe techniques for transfers/ambulation, discharge recommendations/options, and use of call light for assistance and fall prevention.      Patient left sitting edge of bed with all lines intact, call button in reach, RN notified and mom present..    GOALS:   Multidisciplinary Problems     Physical Therapy Goals        Problem: Physical Therapy    Goal Priority Disciplines Outcome Goal Variances Interventions   Physical Therapy Goal     PT, PT/OT Ongoing, Progressing     Description: Goals to be met by: Discharge     Patient will increase functional independence with mobility by performin. Bed to chair transfer with independent no AD  2. Gait  x 200 feet with Supervision using no AD  3. Asc/Desc 4 steps with no hand rail and supervision                         Time Tracking:     PT Received On: 22  PT Start Time: 1135     PT Stop Time: 1152  PT Total Time (min): 17 min     Billable Minutes: Gait Training 17    Treatment Type: Treatment  PT/PTA: PTA     PTA Visit Number: 1     2022

## 2022-04-29 VITALS
RESPIRATION RATE: 20 BRPM | TEMPERATURE: 98 F | SYSTOLIC BLOOD PRESSURE: 125 MMHG | OXYGEN SATURATION: 96 % | DIASTOLIC BLOOD PRESSURE: 89 MMHG | HEIGHT: 74 IN | HEART RATE: 75 BPM | BODY MASS INDEX: 28.1 KG/M2 | WEIGHT: 218.94 LBS

## 2022-04-29 LAB
ALBUMIN SERPL BCP-MCNC: 3.7 G/DL (ref 3.5–5.2)
ALP SERPL-CCNC: 127 U/L (ref 55–135)
ALT SERPL W/O P-5'-P-CCNC: 33 U/L (ref 10–44)
ANION GAP SERPL CALC-SCNC: 16 MMOL/L (ref 8–16)
AST SERPL-CCNC: 23 U/L (ref 10–40)
BACTERIA BLD CULT: NORMAL
BACTERIA BLD CULT: NORMAL
BASOPHILS # BLD AUTO: 0.06 K/UL (ref 0–0.2)
BASOPHILS NFR BLD: 0.9 % (ref 0–1.9)
BILIRUB SERPL-MCNC: 0.4 MG/DL (ref 0.1–1)
BUN SERPL-MCNC: 12 MG/DL (ref 6–20)
CALCIUM SERPL-MCNC: 8.9 MG/DL (ref 8.7–10.5)
CHLORIDE SERPL-SCNC: 99 MMOL/L (ref 95–110)
CO2 SERPL-SCNC: 26 MMOL/L (ref 23–29)
CREAT SERPL-MCNC: 1.3 MG/DL (ref 0.5–1.4)
DIFFERENTIAL METHOD: ABNORMAL
EOSINOPHIL # BLD AUTO: 0.3 K/UL (ref 0–0.5)
EOSINOPHIL NFR BLD: 4.3 % (ref 0–8)
ERYTHROCYTE [DISTWIDTH] IN BLOOD BY AUTOMATED COUNT: 12.4 % (ref 11.5–14.5)
EST. GFR  (AFRICAN AMERICAN): >60 ML/MIN/1.73 M^2
EST. GFR  (NON AFRICAN AMERICAN): >60 ML/MIN/1.73 M^2
GLUCOSE SERPL-MCNC: 93 MG/DL (ref 70–110)
HCT VFR BLD AUTO: 38.8 % (ref 40–54)
HGB BLD-MCNC: 13.7 G/DL (ref 14–18)
IMM GRANULOCYTES # BLD AUTO: 0.03 K/UL (ref 0–0.04)
IMM GRANULOCYTES NFR BLD AUTO: 0.4 % (ref 0–0.5)
LABCORP MISC TEST CODE: NORMAL
LABCORP MISC TEST NAME: NORMAL
LABCORP MISCELLANEOUS TEST: NORMAL
LYMPHOCYTES # BLD AUTO: 2.1 K/UL (ref 1–4.8)
LYMPHOCYTES NFR BLD: 31.5 % (ref 18–48)
MCH RBC QN AUTO: 29.6 PG (ref 27–31)
MCHC RBC AUTO-ENTMCNC: 35.3 G/DL (ref 32–36)
MCV RBC AUTO: 84 FL (ref 82–98)
MONOCYTES # BLD AUTO: 0.6 K/UL (ref 0.3–1)
MONOCYTES NFR BLD: 9.3 % (ref 4–15)
NEUTROPHILS # BLD AUTO: 3.6 K/UL (ref 1.8–7.7)
NEUTROPHILS NFR BLD: 53.6 % (ref 38–73)
NRBC BLD-RTO: 0 /100 WBC
PLATELET # BLD AUTO: 353 K/UL (ref 150–450)
PMV BLD AUTO: 9.6 FL (ref 9.2–12.9)
POTASSIUM SERPL-SCNC: 4.1 MMOL/L (ref 3.5–5.1)
PROT SERPL-MCNC: 7.2 G/DL (ref 6–8.4)
RBC # BLD AUTO: 4.63 M/UL (ref 4.6–6.2)
SODIUM SERPL-SCNC: 141 MMOL/L (ref 136–145)
WBC # BLD AUTO: 6.76 K/UL (ref 3.9–12.7)

## 2022-04-29 PROCEDURE — 25000003 PHARM REV CODE 250: Performed by: INTERNAL MEDICINE

## 2022-04-29 PROCEDURE — 97116 GAIT TRAINING THERAPY: CPT | Mod: CQ

## 2022-04-29 PROCEDURE — 99232 SBSQ HOSP IP/OBS MODERATE 35: CPT | Mod: ,,, | Performed by: INTERNAL MEDICINE

## 2022-04-29 PROCEDURE — 85025 COMPLETE CBC W/AUTO DIFF WBC: CPT | Performed by: FAMILY MEDICINE

## 2022-04-29 PROCEDURE — C9113 INJ PANTOPRAZOLE SODIUM, VIA: HCPCS | Performed by: INTERNAL MEDICINE

## 2022-04-29 PROCEDURE — 25000003 PHARM REV CODE 250: Performed by: STUDENT IN AN ORGANIZED HEALTH CARE EDUCATION/TRAINING PROGRAM

## 2022-04-29 PROCEDURE — 92523 SPEECH SOUND LANG COMPREHEN: CPT

## 2022-04-29 PROCEDURE — 99232 PR SUBSEQUENT HOSPITAL CARE,LEVL II: ICD-10-PCS | Mod: ,,, | Performed by: INTERNAL MEDICINE

## 2022-04-29 PROCEDURE — 63600175 PHARM REV CODE 636 W HCPCS: Performed by: INTERNAL MEDICINE

## 2022-04-29 PROCEDURE — 25000003 PHARM REV CODE 250: Performed by: FAMILY MEDICINE

## 2022-04-29 PROCEDURE — 63600175 PHARM REV CODE 636 W HCPCS: Performed by: FAMILY MEDICINE

## 2022-04-29 PROCEDURE — 80053 COMPREHEN METABOLIC PANEL: CPT | Performed by: FAMILY MEDICINE

## 2022-04-29 RX ORDER — PANTOPRAZOLE SODIUM 40 MG/1
40 TABLET, DELAYED RELEASE ORAL DAILY
Qty: 30 TABLET | Refills: 2 | Status: SHIPPED | OUTPATIENT
Start: 2022-04-29 | End: 2022-07-28

## 2022-04-29 RX ORDER — PANTOPRAZOLE SODIUM 40 MG/10ML
40 INJECTION, POWDER, LYOPHILIZED, FOR SOLUTION INTRAVENOUS DAILY
Qty: 30 EACH | Refills: 2 | Status: SHIPPED | OUTPATIENT
Start: 2022-04-30 | End: 2022-09-28

## 2022-04-29 RX ORDER — DIVALPROEX SODIUM 500 MG/1
500 TABLET, DELAYED RELEASE ORAL EVERY 8 HOURS
Qty: 90 TABLET | Refills: 0 | Status: SHIPPED | OUTPATIENT
Start: 2022-04-29 | End: 2022-09-28

## 2022-04-29 RX ORDER — CEFUROXIME AXETIL 500 MG/1
500 TABLET ORAL EVERY 12 HOURS
Qty: 28 TABLET | Refills: 0 | Status: SHIPPED | OUTPATIENT
Start: 2022-04-29 | End: 2022-05-13

## 2022-04-29 RX ORDER — DIVALPROEX SODIUM 500 MG/1
500 TABLET, DELAYED RELEASE ORAL EVERY 8 HOURS
Qty: 90 TABLET | Refills: 0 | Status: SHIPPED | OUTPATIENT
Start: 2022-04-29 | End: 2022-05-29

## 2022-04-29 RX ADMIN — MUPIROCIN: 20 OINTMENT TOPICAL at 08:04

## 2022-04-29 RX ADMIN — CHLORHEXIDINE GLUCONATE 15 ML: 1.2 RINSE ORAL at 08:04

## 2022-04-29 RX ADMIN — DIVALPROEX SODIUM 500 MG: 250 TABLET, DELAYED RELEASE ORAL at 05:04

## 2022-04-29 RX ADMIN — ACETAMINOPHEN 650 MG: 325 TABLET ORAL at 09:04

## 2022-04-29 RX ADMIN — DIVALPROEX SODIUM 500 MG: 250 TABLET, DELAYED RELEASE ORAL at 01:04

## 2022-04-29 RX ADMIN — ACETAMINOPHEN 650 MG: 325 TABLET ORAL at 06:04

## 2022-04-29 RX ADMIN — PANTOPRAZOLE SODIUM 40 MG: 40 INJECTION, POWDER, LYOPHILIZED, FOR SOLUTION INTRAVENOUS at 08:04

## 2022-04-29 RX ADMIN — ACYCLOVIR SODIUM 680 MG: 500 INJECTION, SOLUTION INTRAVENOUS at 08:04

## 2022-04-29 RX ADMIN — ACETAMINOPHEN 650 MG: 325 TABLET ORAL at 02:04

## 2022-04-29 NOTE — PROGRESS NOTES
Consult Note  Infectious Disease    Reason for Consult:  Meningitis    HPI: Iván Amaral is a 37 y.o. male Was recently hospitalized as 4/18 until 4/19 with transient numbness, tingling of the right hand with clumsiness, right leg weakness and right-sided facial weakness.  CT scan of the head done in the emergency room showed a possible small subarachnoid hemorrhage.  He was seen by Neurology and had an MRI suggesting no subarachnoid hemorrhage.  He was placed on lipid-lowering agent, aspirin has for TIA and released.  He was brought back to the emergency room yesterday by EMS because of unresponsive state, staring to the left lasting approximately 45-60 seconds with subsequent difficulty swallowing and with expressive aphasia.  The ER record states that family noted him to be hyperventilating and diaphoretic.  The patient apparently stated that he took 2 Prozac pills and in the emergency room he was tachycardic and hypertensive.  Repeat CT head showed no acute intracranial pathology but underlying paranasal sinus disease.  History and physical states that mother reported recurrence of headache with paresthesias on 04/20 and these resolved as well.  His mother who is a nurse had came to stay with him, and noted a lingering cough and the patient did have an upper respiratory infection a few weeks ago and his roommate recently had COVID.  Mother also reported to admitting physician that his home environment is unclean, there are cats, mold and trash everywhere.  In the emergency room he was intubated/sedated due to erratic and unsafe behavior, a lumbar puncture was performed demonstrating 2900 red blood cells and 225 white blood cells 88% lymphocytes with a very elevated protein of 186. .  Serotonin syndrome was suspected as well.  He was begun on ceftriaxone vancomycin and  IV acyclovir and admitted to the intensive care unit    Lactic acid was 14, white blood cells 17,000, creatinine 1.5, glucose 213, CPK  was 592  He has no fever and WBC are improved, heart rate is less than 100, BP still elevated, seem to have some posturing. HIV negative 2020.     4/26: interim reviewed. Visited yesterday afternoon and he was extubated and mother at bedside. CSF PCR panel was negative for all. CSF culture is negative so far. Respiratory viral panel(NP) was positive for parainfluenza 3. This is not included in the CSF pcr panel. Parainfluenza can cause meningitis. No fever, WBC improved. CXR with trace atelectasis left base. Only normal gunjan on sputum culture and urine and blood cultures negative. . He is alert and interactive. He denies headache now and describes chronic frontal headaches, which were worse recently. Mother states privately that he is not at baseline personality and is more angry and aggressive and this is much different than usual. He desires mejia out and diet and will order.   4/27: interim reviewed. Afebrile., started on AEDs due to abnormal EEG and focal physical movements, localizing to frontal and not temporal. MRI of the brain was unremarkable. CSF culture is negative. HIV Ab/Ag negative. Multiple serologies pending. He has mild frontal headache, mild generalized aches and pains. Minimal sinus congestion and cough. Voiding without difficulty. Brother at bedside. Discussed available results from studies.   4/28:  Interim reviewed.  Repeat EEG was apparently improved.  Still having some behavioral issues some unpredictable behavior.  Herpes PCR, West Nile antibody, cryptococcal antigen, arbovirus antibodies are still pending. RPR is nonreactive.  Left labs are stable.he has more frontal headache today. May have a little photophobia.Mother states he is still having some short term memory lapse. He is currently up in the chair. He also endorses some clumsiness with texting on his phone. No significant balance issues, speech or bowel/bladder.    4/29:  Interim reviewed herpes DNA on spinal fluid negative  arbovirus pending, West Nile serum antibodies negative, cryptococcal antigen still pending. He looks and feels better overall, but not near baseline. His strength is still decreased and mother finds that he is still having word finding difficulties.     EXAM & DIAGNOSTICS REVIEWED:   Vitals:     Temp:  [98 °F (36.7 °C)-98.8 °F (37.1 °C)]   Temp: 98 °F (36.7 °C) (04/29/22 0726)  Pulse: 69 (04/29/22 0726)  Resp: 12 (04/29/22 0726)  BP: (!) 136/93 (04/29/22 0726)  SpO2: 97 % (04/29/22 0726)    Intake/Output Summary (Last 24 hours) at 4/29/2022 0953  Last data filed at 4/29/2022 0200  Gross per 24 hour   Intake 390 ml   Output --   Net 390 ml       General:  Alert and cooperative,  attentive    Eyes:  Anicteric, EOMI  ENT:  Healing bite trauma to the left side of the tongue dentition is very good  Neck:  Completely supple,    Lungs: clear  Heart:  RRR, no gallop/murmur/rub noted  Abd:  Soft, NT, ND, normal BS, no masses or organomegaly appreciated.  No perirectal or perianal lesions  :  voiding  Musc:  Joints without effusion, swelling, erythema, synovitis, muscle wasting.   Skin:  No rashes.   Neuro: Alert , face symmetric, speech is fluent, no focal motor deficit. mentating well.   Psych:   Calm and cooperative.    Lymphatic:        Extrem: No edema, erythema, phlebitis, cellulitis, warm and well perfused  VAD:   Right arm picc    Isolation:  none  Wound: none         General Labs reviewed:  Recent Labs   Lab 04/27/22 0452 04/28/22 0439 04/29/22 0223   WBC 8.71 7.68 6.76   HGB 12.0* 12.7* 13.7*   HCT 34.8* 38.2* 38.8*    311 353       Recent Labs   Lab 04/27/22 0452 04/28/22 0439 04/29/22 0223    143 141   K 3.6 3.8 4.1    102 99   CO2 28 32* 26   BUN 12 9 12   CREATININE 1.4 1.3 1.3   CALCIUM 8.5* 8.6* 8.9   PROT 6.4 6.8 7.2   BILITOT 0.4 0.4 0.4   ALKPHOS 107 127 127   ALT 28 36 33   AST 36 31 23     Recent Labs   Lab 04/25/22  0343   CRP 0.55        Latest Reference Range & Units 04/24/22  22:00   COLOR CSF Colorless  Red !   Heme Aliquot mL 3.0   Appearance, CSF Clear  Bloody !   RBC, CSF 0 /cu mm 2965 !   WBC, CSF 0 - 5 /cu mm 225 (HH) [1]   Segmented Neutrophils, CSF 0 - 6 % 2   Lymphs, CSF 40 - 80 % 88 (H)   Mono/Macrophage, CSF 15 - 45 % 10 (L)   Glucose, CSF 40 - 70 mg/dL 80 (H) [2]   Protein, CSF 15 - 40 mg/dL 186 (H) [3]       Serum West Nile antibodies negative IgG and IgM  Herpes simplex 1 and 2 DNA by PCR on CSF negative    Micro:  Microbiology Results (last 7 days)     Procedure Component Value Units Date/Time    Blood culture [[id number]] Collected: 04/24/22 1920    Order Status: Completed Specimen: Blood from Peripheral, Antecubital, Right Updated: 04/28/22 2032     Blood Culture, Routine No Growth to date      No Growth to date      No Growth to date      No Growth to date      No Growth to date    Blood culture [[id number]] Collected: 04/24/22 1925    Order Status: Completed Specimen: Blood from Peripheral, Antecubital, Right Updated: 04/28/22 2032     Blood Culture, Routine No Growth to date      No Growth to date      No Growth to date      No Growth to date      No Growth to date    CSF culture and Gram Stain (Tube 2) [[id number]] Collected: 04/24/22 2200    Order Status: Completed Specimen: CSF (Spinal Fluid) from CSF Tap, Tube 2 Updated: 04/28/22 1018     CSF CULTURE No Growth     Gram Stain Result Few WBC's      No organisms seen    Narrative:      On which sequentially labeled tube should this analysis be  performed?->2    Urine Culture High Risk [[id number]] Collected: 04/25/22 0143    Order Status: Completed Specimen: Urine, Catheterized Updated: 04/28/22 0807     Urine Culture, Routine No growth    Narrative:      Indicated criteria for high risk culture:->Other  Other (specify):->HIGH RISK    Culture, Respiratory with Gram Stain [[id number]] Collected: 04/25/22 0000    Order Status: Completed Specimen: Respiratory from Endotracheal Aspirate Updated: 04/27/22 0854     Respiratory  Culture Normal respiratory ugnjan     Gram Stain (Respiratory) <10 epithelial cells per low power field.     Gram Stain (Respiratory) Many WBC's     Gram Stain (Respiratory) Many Gram positive cocci     Gram Stain (Respiratory) Few Gram negative rods    Respiratory Infection Panel (PCR), Nasopharyngeal [[id number]]  (Abnormal) Collected: 04/25/22 0347    Order Status: Completed Updated: 04/25/22 1505     Respiratory Infection Panel Source NP swab     Adenovirus Not Detected     Coronavirus 229E, Common Cold Virus Not Detected     Coronavirus HKU1, Common Cold Virus Not Detected     Coronavirus NL63, Common Cold Virus Not Detected     Coronavirus OC43, Common Cold Virus Not Detected     Comment: The Coronavirus strains detected in this test cause the common cold.  These strains are not the COVID-19 (novel Coronavirus)strain   associated with the respiratory disease outbreak.          SARS-CoV2 (COVID-19) Qualitative PCR Not Detected     Human Metapneumovirus Not Detected     Human Rhinovirus/Enterovirus Not Detected     Influenza A (subtypes H1, H1-2009,H3) Not Detected     Influenza B Not Detected     Parainfluenza Virus 1 Not Detected     Parainfluenza Virus 2 Not Detected     Parainfluenza Virus 3 Detected     Parainfluenza Virus 4 Not Detected     Respiratory Syncytial Virus Not Detected     Bordetella Parapertussis (RK0791) Not Detected     Bordetella pertussis (ptxP) Not Detected     Chlamydia pneumoniae Not Detected     Mycoplasma pneumoniae Not Detected     Comment: Respiratory Infection Panel testing performed by Multiplex PCR.       Narrative:      Specimen Source->Nasopharyngeal Swab    Fungus culture [[id number]] Collected: 04/24/22 2200    Order Status: No result Specimen: CSF (Spinal Fluid) Updated: 04/25/22 0814    Gladys Ink (CSF) [[id number]] Collected: 04/24/22 2200    Order Status: Completed Specimen: CSF (Spinal Fluid) Updated: 04/25/22 0805     Gladys Ink No encapsulated yeast seen    Fungus culture  [[id number]]     Order Status: No result Specimen: CSF (Spinal Fluid) from CSF Tap, Tube 1     Cryptococcal antigen [[id number]]     Order Status: Completed Specimen: Blood, Venous     Gladys Ink (CSF) [[id number]]     Order Status: Completed Specimen: CSF (Spinal Fluid) from CSF Tap, Tube 1     MRSA Screen by PCR [[id number]] Collected: 04/25/22 0347    Order Status: Completed Specimen: Nasopharyngeal Swab from Nasal Updated: 04/25/22 0559     MRSA SCREEN BY PCR Negative    Group A Strep, Molecular [[id number]] Collected: 04/25/22 0347    Order Status: Completed Specimen: Throat Updated: 04/25/22 0459     Group A Strep, Molecular Negative     Comment: Arcanobacterium haemolyticum and Beta Streptococcus group C   and G will not be detected by this test method.  Please order   Throat Culture ([id number]) if suspected.         Resp Viral Panel PCR, Peds Under 7 Months Nasopharyngeal Swab [[id number]] Collected: 04/25/22 0347    Order Status: Canceled           Imaging Reviewed:   CXR   CT head   IMPRESSION:  1. No acute intracranial pathology.  2. Underlying paranasal sinus disease.    Cardiology:  TTE 4/19  · The left ventricle is normal in size with concentric remodeling and normal systolic function.  · The estimated ejection fraction is 65%.  · Normal left ventricular diastolic function.  · There is no evidence of intracardiac shunting. Bubble study negative for PFO  · Atrial fibrillation not observed.  · Normal right ventricular size with normal right ventricular systolic function.  · Normal central venous pressure (3 mmHg).  · The estimated PA systolic pressure is 11 mmHg.         IMPRESSION & PLAN   1. Meningoencephalitis   Cell count/diff suggests non bacterial etiology   HIV negative 2020 makes fungal and opportunistic meningoencephalitis much less likely   Negative CSF pcr panel, additional serologies pending   Negative West Nile serum antibodies   Negative herpes 1 and 2 DNA by PCR on send out  CSF      Behavioral/personality changes, apparently for a while prior to illness   Abnormal EEG, started on AED    2. Recent TIA symptoms, negative MRI, likely prodrome for this illness  3. Paranasal sinus disease, more on 4/24 CT than last week   Parainfluenza 3 positive on nasal pcr           Recommendations:  Stopping acyclovir    Rocephin q24 for sinuses while here then follow with oral antibiotics(ceftin 500 mg po bid for a total duration of antibiotics of 14d)  Awaiting cryptococcal antigen , arbovirus antibody panel,  autoimmune panel         D/w Neurology, patient, mother      Will follow peripherally while here. I have given him my card to call for remaining ID work up    [phone] mother       Medical Decision Making during this encounter was  [_] Low Complexity  [_] Moderate Complexity  [ xxx ] High Complexity

## 2022-04-29 NOTE — PT/OT/SLP PROGRESS
Physical Therapy Treatment    Patient Name:  Iván Amaral   MRN:  [id number]    Recommendations:     Discharge Recommendations:  home health PT (d/c to mother's home)   Discharge Equipment Recommendations: none   Barriers to discharge: increased assist with mobility    Assessment:     Iván Amaral is a 37 y.o. male admitted with a medical diagnosis of Meningitis.  He presents with the following impairments/functional limitations:  weakness, impaired endurance, impaired self care skills, impaired functional mobilty, gait instability, impaired balance, decreased lower extremity function, decreased safety awareness, impaired cardiopulmonary response to activity, pain.  Pt agreeable to visit. Pt agreeable to ambulate in hallway. Pt c/o general all over body soreness. Pt able to ambulate with SBA today which is an improvement from yesterday. Pt requested tylenol. RN informed. Pt tolerated session well.    Rehab Prognosis: Fair; patient would benefit from acute skilled PT services to address these deficits and reach maximum level of function.    Recent Surgery: * No surgery found *      Plan:     During this hospitalization, patient to be seen 3 x/week to address the identified rehab impairments via gait training, therapeutic activities, therapeutic exercises, neuromuscular re-education and progress toward the following goals:    · Plan of Care Expires:  05/27/22    Subjective     Chief Complaint: all over soreness  Patient/Family Comments/goals: return home  Pain/Comfort:  · Pain Rating 1:  (not rated)  · Location - Orientation 1: generalized  · Location 1:  (whole body)  · Pain Addressed 1: Nurse notified      Objective:     Communicated with RN prior to session.  Patient found with bed in chair position with peripheral IV, telemetry, blood pressure cuff, pulse ox (continuous) upon PT entry to room.     General Precautions: Standard, fall   Orthopedic Precautions:N/A   Braces:    Respiratory Status: Room  air     Functional Mobility:  · Transfers:     · Sit to Stand:  supervision with no AD  · Gait: x 250' with no AD and SBA with vc for direction and navigation.      AM-PAC 6 CLICK MOBILITY          Therapeutic Activities and Exercises:   Pt educated on importance of time OOB, importance of intermittent mobility, safe techniques for transfers/ambulation, discharge recommendations/options, and use of call light for assistance and fall prevention.      Patient left with bed in chair position with all lines intact, call button in reach and RN present..    GOALS:   Multidisciplinary Problems     Physical Therapy Goals        Problem: Physical Therapy    Goal Priority Disciplines Outcome Goal Variances Interventions   Physical Therapy Goal     PT, PT/OT Ongoing, Progressing     Description: Goals to be met by: Discharge     Patient will increase functional independence with mobility by performin. Bed to chair transfer with independent no AD  2. Gait  x 200 feet with Supervision using no AD  3. Asc/Desc 4 steps with no hand rail and supervision                         Time Tracking:     PT Received On: 22  PT Start Time: 0932     PT Stop Time: 0946  PT Total Time (min): 14 min     Billable Minutes: Gait Training 14    Treatment Type: Treatment  PT/PTA: PTA     PTA Visit Number: 2     2022

## 2022-04-29 NOTE — PLAN OF CARE
Problem: Infection  Goal: Absence of Infection Signs and Symptoms  Outcome: Ongoing, Progressing     Problem: Adult Inpatient Plan of Care  Goal: Plan of Care Review  Outcome: Ongoing, Progressing  Goal: Patient-Specific Goal (Individualized)  Outcome: Ongoing, Progressing  Goal: Absence of Hospital-Acquired Illness or Injury  Outcome: Ongoing, Progressing  Goal: Optimal Comfort and Wellbeing  Outcome: Ongoing, Progressing  Goal: Readiness for Transition of Care  Outcome: Ongoing, Progressing    Problem: Skin Injury Risk Increased  Goal: Skin Health and Integrity  Outcome: Ongoing, Progressing     Problem: Fall Injury Risk  Goal: Absence of Fall and Fall-Related Injury  Outcome: Ongoing, Progressing     Problem: Restraint, Nonbehavioral (Nonviolent)  Goal: Absence of Harm or Injury  Outcome: Ongoing, Progressing     Problem: Oral Intake Inadequate  Goal: Improved Oral Intake  Outcome: Ongoing, Progressing

## 2022-04-29 NOTE — DISCHARGE SUMMARY
[facility] Medicine  Discharge Summary      Patient Name: Iván Amaral  MRN: [id number]  Patient Class: IP- Inpatient  Admission Date: 4/24/2022  Hospital Length of Stay: 5 days  Discharge Date and Time:  04/29/2022 6:00 PM  Attending Physician: Jakob Cadena MD   Discharging Provider: Jakob Cadena MD  Primary Care Provider: Hugo Moran MD      HPI:   No notes on file    * No surgery found *      Hospital Course:   4/26/2022  Mr Corral has had right sided rhythmic movements with sleep. His throat is sore post extubation    4/27/2022  I am OK with no complaints save fatigue and weakness. No seizure like activity per mother. Familial A 1 antitrypsin disease with variable penetrance    4/28/2022  Mr Amaral is much more alert and awake with no seizure activity. He walked from ICU to [facility] care.    4/29/2022  I have discussed the case with Dr Ace Goncalves and the Munir family . At this point the patient has no seizures on Depakote, is awake and alert with adequate mobility. Mr Amaral would like to go home  ROS: mild weakness, fatigue see above otherwise negative X 8   PE in no distress HEENT BJORN EOM intact moist mucus membranes Neck supple no use of accessory muscles Lungs no crackles wheezes or rhonchi Heart S 1 S 2 RRR no murmur Ext without CC or E pulses 1-2+ skin no acute finding Neuro mildly dulled affect no seizure activity no acute sensory or motor deficit        Goals of Care Treatment Preferences:  Code Status: Full Code      Consults:   Consults (From admission, onward)        Status Ordering Provider     Inpatient consult to PICC Line Nurse  Once        Provider:  (Not yet assigned)    ANA Stephens     Inpatient consult to Registered Dietitian/Nutritionist  Once        Provider:  (Not yet assigned)    Completed DONNY FUENTES     Inpatient consult to Gastroenterology  Once        Provider:  Tacho Medina MD    Completed DONNY FUENTES      Inpatient consult to Pulmonology  Once        Provider:  Hyacinth Sher MD    Completed SHIRA BLEVINS     Inpatient consult to Infectious Diseases  Once        Provider:  Farhana Phillips MD    Completed SHIRA BLEVINS     Inpatient consult to Neurology  Once        Provider:  Koko Stephenson MD    Completed SHIRA BLEVINS     Inpatient consult to Hospitalist  Once        Provider:  Travis Chow, DO    Acknowledged SHIRA BLEVINS          No new Assessment & Plan notes have been filed under this hospital service since the last note was generated.  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    Specialty: Neurology  Contact information:  [address] [facility] [address]  [phone]             Hugo Moran MD Follow up in 2 week(s).    Specialty: Family Medicine  Contact information:  [address] [address]  [phone]                       Patient Instructions:      Comprehensive metabolic panel   Standing Status: Future Standing Exp. Date: 06/28/23     CBC auto differential   Standing Status: Future Standing Exp. Date: 06/28/23     Valproic Acid   Standing Status: Future Standing Exp. Date: 06/28/23     Diet Cardiac     Activity as tolerated       Significant Diagnostic Studies: Labs:   BMP:   Recent Labs   Lab 04/28/22  0439  04/29/22 0223   GLU 95 93    141   K 3.8 4.1    99   CO2 32* 26   BUN 9 12   CREATININE 1.3 1.3   CALCIUM 8.6* 8.9   , CMP   Recent Labs   Lab 04/28/22 0439 04/29/22 0223    141   K 3.8 4.1    99   CO2 32* 26   GLU 95 93   BUN 9 12   CREATININE 1.3 1.3   CALCIUM 8.6* 8.9   PROT 6.8 7.2   ALBUMIN 3.5 3.7   BILITOT 0.4 0.4   ALKPHOS 127 127   AST 31 23   ALT 36 33   ANIONGAP 9 16   ESTGFRAFRICA >60.0 >60.0   EGFRNONAA >60.0 >60.0   , CBC   Recent Labs   Lab 04/28/22 0439 04/29/22 0223   WBC 7.68 6.76   HGB 12.7* 13.7*   HCT 38.2* 38.8*    353   , INR   Lab Results   Component Value Date    INR 1.1 04/25/2022    INR 1.1 04/19/2022    INR 1.0 04/18/2022   , Troponin   Recent Labs   Lab 04/24/22  1911 04/24/22 2216   TROPONINI <0.030 <0.030    and All labs within the past 24 hours have been reviewed  Microbiology:   Blood Culture   Lab Results   Component Value Date    LABBLOO No Growth to date 04/24/2022    LABBLOO No Growth to date 04/24/2022    LABBLOO No Growth to date 04/24/2022    LABBLOO No Growth to date 04/24/2022    LABBLOO No Growth to date 04/24/2022    and Urine Culture    Lab Results   Component Value Date    LABURIN No growth 04/25/2022       Pending Diagnostic Studies:     Procedure Component Value Units Date/Time    Ethanol [[id number]] Collected: 04/24/22 1935    Order Status: Sent Lab Status: In process Updated: 04/24/22 1936    Specimen: Blood          Procedure Component Value Units Date/Time   Fungus culture [[id number]]    Order Status: No result Specimen: CSF (Spinal Fluid) from CSF Tap, Tube 1    Cryptococcal antigen [[id number]]    Order Status: Completed Specimen: Blood, Venous    Gladys Ink (CSF) [[id number]]    Order Status: Completed Specimen: CSF (Spinal Fluid) from CSF Tap, Tube 1    MRSA Screen by PCR [[id number]] Collected: 04/25/22 0347   Order Status: Completed Specimen: Nasopharyngeal Swab from Nasal Updated: 04/25/22 0559    MRSA SCREEN BY PCR Negative    Resp Viral Panel PCR, Peds Under 7 Months Nasopharyngeal Swab [[id number]] Collected: 04/25/22 0347   Order Status: Canceled    Group A Strep, Molecular [[id number]] Collected: 04/25/22 0347   Order Status: Completed Specimen: Throat Updated: 04/25/22 0459    Group A Strep, Molecular Negative    Comment: Arcanobacterium haemolyticum and Beta Streptococcus group C   and G will not be detected by this test method.  Please order   Throat Culture ([id number]) if suspected.       Respiratory Infection Panel (PCR), Nasopharyngeal [[id number]] (Abnormal) Collected: 04/25/22 0347   Order Status: Completed Updated: 04/25/22 1505    Respiratory Infection Panel Source NP swab    Adenovirus Not Detected    Coronavirus 229E, Common Cold Virus Not Detected    Coronavirus HKU1, Common Cold Virus Not Detected    Coronavirus NL63, Common Cold Virus Not Detected    Coronavirus OC43, Common Cold Virus Not Detected    Comment: The Coronavirus strains detected in this test cause the common cold.   These strains are not the COVID-19 (novel Coronavirus)strain   associated with the respiratory disease outbreak.        SARS-CoV2 (COVID-19) Qualitative PCR Not Detected    Human Metapneumovirus Not Detected    Human Rhinovirus/Enterovirus Not Detected    Influenza A (subtypes H1, H1-2009,H3) Not Detected    Influenza B Not Detected    Parainfluenza Virus 1 Not Detected    Parainfluenza Virus 2 Not Detected    Parainfluenza Virus 3 Detected Abnormal     Parainfluenza Virus 4 Not Detected    Respiratory Syncytial Virus Not Detected    Bordetella Parapertussis (QI6769) Not Detected    Bordetella pertussis (ptxP) Not Detected    Chlamydia pneumoniae Not Detected    Mycoplasma pneumoniae Not Detected    Comment: Respiratory Infection Panel testing performed by Multiplex PCR.      Narrative:     Specimen Source->Nasopharyngeal Swab   Urine Culture High Risk [[id number]] Collected: 04/25/22 0143   Order Status: Completed Specimen: Urine, Catheterized  Updated: 04/28/22 0807    Urine Culture, Routine No growth   Narrative:     Indicated criteria for high risk culture:->Other   Other (specify):->HIGH RISK   Culture, Respiratory with Gram Stain [[id number]] Collected: 04/25/22 0000   Order Status: Completed Specimen: Respiratory from Endotracheal Aspirate Updated: 04/27/22 0854    Respiratory Culture Normal respiratory gunjan    Gram Stain (Respiratory) <10 epithelial cells per low power field.    Gram Stain (Respiratory) Many WBC's    Gram Stain (Respiratory) Many Gram positive cocci    Gram Stain (Respiratory) Few Gram negative rods   CSF culture and Gram Stain (Tube 2) [[id number]] Collected: 04/24/22 2200   Order Status: Completed Specimen: CSF (Spinal Fluid) from CSF Tap, Tube 2 Updated: 04/28/22 1018    CSF CULTURE No Growth    Gram Stain Result Few WBC's     No organisms seen   Narrative:     On which sequentially labeled tube should this analysis be   performed?->2   Gladys Ink (CSF) [[id number]] Collected: 04/24/22 2200   Order Status: Completed Specimen: CSF (Spinal Fluid) Updated: 04/25/22 0805    Gladys Ink No encapsulated yeast seen   Fungus culture [[id number]] Collected: 04/24/22 2200   Order Status: No result Specimen: CSF (Spinal Fluid) Updated: 04/25/22 0814   Blood culture [[id number]] Collected: 04/24/22 1925   Order Status: Completed Specimen: Blood from Peripheral, Antecubital, Right Updated: 04/28/22 2032    Blood Culture, Routine No Growth to date     No Growth to date     No Growth to date     No Growth to date     No Growth to date   Blood culture [[id number]] Collected: 04/24/22 1920   Order Status: Completed Specimen: Blood from Peripheral, Antecubital, Right Updated: 04/28/22 2032    Blood Culture, Routine No Growth to date     No Growth to date     No Growth to date     No Growth to date     No Growth to date       Medications:  Reconciled Home Medications:      Medication List      START taking these medications    divalproex 500 MG  Tbec  Commonly known as: DEPAKOTE  Take 1 tablet (500 mg total) by mouth every 8 (eight) hours.     pantoprazole 40 mg injection  Commonly known as: PROTONIX  Inject 40 mg into the vein once daily.  Start taking on: April 30, 2022     Ceftin 500 mg po BID X 14 days   CONTINUE taking these medications    atorvastatin 40 MG tablet  Commonly known as: LIPITOR  Take 1 tablet (40 mg total) by mouth once daily.     benzonatate 100 MG capsule  Commonly known as: TESSALON  Take 100 mg by mouth 3 (three) times daily as needed for Cough.     fluticasone propionate 50 mcg/actuation nasal spray  Commonly known as: FLONASE  1 spray (50 mcg total) by Each Nostril route once daily.     multivitamin per tablet  Commonly known as: THERAGRAN  Take 1 tablet by mouth once daily.        STOP taking these medications    allopurinoL 300 MG tablet  Commonly known as: ZYLOPRIM     aspirin 81 MG EC tablet  Commonly known as: ECOTRIN     buPROPion 150 MG TBSR 12 hr tablet  Commonly known as: WELLBUTRIN SR     pravastatin 10 MG tablet  Commonly known as: PRAVACHOL            Indwelling Lines/Drains at time of discharge:   Lines/Drains/Airways     Peripherally Inserted Central Catheter Line  Duration           PICC Double Lumen 04/25/22 1215 right basilic 4 days                Time spent on the discharge of patient: 45 minutes         Jakob Cadena MD  Department of Hospital Medicine  [facility]

## 2022-04-29 NOTE — PT/OT/SLP EVAL
Speech Language Pathology Evaluation  Cognitive Communication    Patient Name:  Iván Amaral   MRN:  [id number]  Admitting Diagnosis: Meningitis    Recommendations:     Recommendations:                General Recommendations:  Cognitive-linguistic therapy    History:     PER EMR:   HPI:     Iván Amaral is a 37 y.o. male Was recently hospitalized as 4/18 until 4/19 with transient numbness, tingling of the right hand with clumsiness, right leg weakness and right-sided facial weakness.  CT scan of the head done in the emergency room showed a possible small subarachnoid hemorrhage.  He was seen by Neurology and had an MRI suggesting no subarachnoid hemorrhage.  He was placed on lipid-lowering agent, aspirin has for TIA and released.  He was brought back to the emergency room yesterday by EMS because of unresponsive state, staring to the left lasting approximately 45-60 seconds with subsequent difficulty swallowing and with expressive aphasia.  The ER record states that family noted him to be hyperventilating and diaphoretic.  The patient apparently stated that he took 2 Prozac pills and in the emergency room he was tachycardic and hypertensive.  Repeat CT head showed no acute intracranial pathology but underlying paranasal sinus disease.  History and physical states that mother reported recurrence of headache with paresthesias on 04/20 and these resolved as well.  His mother who is a nurse had came to stay with him, and noted a lingering cough and the patient did have an upper respiratory infection a few weeks ago and his roommate recently had COVID.  Mother also reported to admitting physician that his home environment is unclean, there are cats, mold and trash everywhere.  In the emergency room he was intubated/sedated due to erratic and unsafe behavior, a lumbar puncture was performed demonstrating 2900 red blood cells and 225 white blood cells 88% lymphocytes with a very elevated protein of 186.  Serotonin syndrome was suspected as well.  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 Amaral is a 37-year-old gentleman with no significant past medical history who presented with altered mental status and seizure-like event. He was agitated, combative, hypertensive and tachycardic requiring intubation and sedation.  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  Most likely viral, but need to rule out autoimmune since mother reporting behavioral changes for several weeks preceding the event that led to hospital admission.  Neurology consulted for assistance with management.  - admitted to the ICU,on telemetry monitoring and q.1 hour neuro checks  - LP was performed, consistent with viral or autoimmune meningitis/encephalitis:  Elevated white blood count with lymphocytic predominance, moderately elevated protein  - HSV 1 and 2 PCR was ordered in the CSF and resulted negative.  Acyclovir can be discontinued.  - Ordered autoimmune encephalitis panel, needs to be sent from sample obtained from LP, lab was called yesterday  - West Nile virus in CSF was negative  - CSF cultures no growth to date  - infectious Disease following closely, appreciate recommendations  - EEG showed intermittent frontal rhythmic delta with clinical correlation concerning for focal seizure. There was no generalization. Patient arousable, able to follow commands. Repeat EEG improved, no reported seizure activity  - Continue Valproic acid 500 mg Q8H, can obtain new level tomorrow  - will follow along    Past Medical History:   Diagnosis Date    Acute gout of left foot 12/7/2020    Gout 2019    Hyperlipidemia     Stomach ulcer         Past Surgical History:   Procedure Laterality Date    NASAL SEPTUM SURGERY               Subjective     Pt alert, mother present at bedside   Patient goals: none stated  Attributes reported word retrieval deficits w/ poor sleep     Pain/Comfort:  · Pain Rating 1: 0/100/10    Respiratory Status: Room air    Objective:     Comprehensive standardized assessment at next level of care recommended. Cole administered this date for screening.     Cognitive Status:  Behavioral Observations: alert and appropriate  Memory   Immediate: Intact as demonstrated by Pt's ability to repeat 5/5 numerical items & 5/5 unrelated words; able to repeat up to 5 numerical units    Working: Intact as demonstrated by Pt's ability to apply verbally provided content to answer functional math/time calculations and complete mental manipulations with 100% acc.    Short-term: impaired; Pt able to recall 3/5 unrelated items ind'ly.    Long-term: Intact; Pt provided detailed biographical and medical history   Orientation: orientedx4  Attention: No redirection or cueing warranted; No evident deficits   Problem Solving: Pt provided appropriate responses to time and money word problems with 100% acc. Compare/contrast: 100% acc   Executive Function: Requires testing.    Verbal Fluency: Given semantic category and one minute time constraint Pt generated 12 items ind'ly (average >15)    Receptive Language:   Comprehension: WFL  · Follows complex commands   · Answers complex Y/N questions   · Answers questions following presentation of short paragraph at 100% acc.     Expressive Language:  Verbal:    · Conversational speech: Fluent and appropriate at the conversational level without evidence of word retrieval, semantic or syntactic error       Motor Speech:  · No evidence of Apraxia of Speech or Dysarthria  · 100% intelligible     Voice: WNL    Visual-Spatial: WNL  · Attending to R and L planes w/out evidence of inattention, neglect or preferential  gaze   · Able to construct clock drawing at 100% acc     SLUMS SCORE: 27/30    Assessment:   Iván Amaral is a 37 y.o. male with an SLP diagnosis of reported signs of cognitive communication impairment; signs of impaired recall and word retrieval appreciated. Recommend SLP at next level of care. Patient presently preparing for d/c.     Plan:     · SLP Follow-Up:  Yes  Yes       Time Tracking:   SLP Treatment Date:   04/29/22  Speech Start Time:  1323  Speech Stop Time:  1333     Speech Total Time (min):  10 min    Billable Minutes: Eval 10     04/29/2022

## 2022-04-29 NOTE — CARE UPDATE
04/28/22 2244   Patient Assessment/Suction   Level of Consciousness (AVPU) alert   Respiratory Effort Unlabored   Expansion/Accessory Muscles/Retractions no use of accessory muscles   All Lung Fields Breath Sounds clear;diminished   PRE-TX-O2   O2 Device (Oxygen Therapy) room air   SpO2 95 %   Pulse Oximetry Type Continuous   $ Pulse Oximetry - Multiple Charge Pulse Oximetry - Multiple   Pulse 64   Resp 10   Positioning   Head of Bed (HOB) Positioning HOB elevated;HOB at 30 degrees   Respiratory Evaluation   $ Care Plan Tech Time 15 min

## 2022-04-30 NOTE — PROGRESS NOTES
Pt discharged home with mom. Discharge instructions given to patient and family. Medications sent to pharmacy by MD. No acute distress noted at the time of discharge. Vital signs stable.

## 2022-04-30 NOTE — PLAN OF CARE
04/30/22 0932   Final Note   Assessment Type Final Discharge Note   Anticipated Discharge Disposition Home   What phone number can be called within the next 1-3 days to see how you are doing after discharge? [phone]     Discharge orders reviewed. No case management/discharge planning needs noted.

## 2022-05-09 ENCOUNTER — PATIENT MESSAGE (OUTPATIENT)
Dept: FAMILY MEDICINE | Facility: CLINIC | Age: 37
End: 2022-05-09

## 2022-05-09 ENCOUNTER — TELEPHONE (OUTPATIENT)
Dept: INFECTIOUS DISEASES | Facility: CLINIC | Age: 37
End: 2022-05-09

## 2022-05-09 ENCOUNTER — TELEPHONE (OUTPATIENT)
Dept: FAMILY MEDICINE | Facility: CLINIC | Age: 37
End: 2022-05-09

## 2022-05-09 NOTE — TELEPHONE ENCOUNTER
Reviewed all of the ID related test results, all of which were negative.   Mother reports that he is closer to his baseline self.  They are in touch with Dr. Bello.

## 2022-05-09 NOTE — TELEPHONE ENCOUNTER
Patient is changing appointment date because he is out of town. And he wants to know if Allopurinol and Atorvastatin will hurt him with his new medication Depakote 500 mg tid.

## 2022-05-23 LAB — FUNGUS SPEC CULT: NORMAL

## 2022-05-24 ENCOUNTER — PATIENT MESSAGE (OUTPATIENT)
Dept: FAMILY MEDICINE | Facility: CLINIC | Age: 37
End: 2022-05-24

## 2022-05-24 DIAGNOSIS — E79.0 HYPERURICEMIA: Primary | ICD-10-CM

## 2022-05-25 DIAGNOSIS — M10.9 ACUTE GOUT OF LEFT FOOT, UNSPECIFIED CAUSE: Primary | ICD-10-CM

## 2022-05-25 RX ORDER — ALLOPURINOL 300 MG/1
300 TABLET ORAL DAILY
OUTPATIENT
Start: 2022-05-25

## 2022-05-25 RX ORDER — ALLOPURINOL 300 MG/1
300 TABLET ORAL DAILY
Qty: 30 TABLET | Refills: 2 | Status: SHIPPED | OUTPATIENT
Start: 2022-05-25 | End: 2022-09-08

## 2022-05-25 NOTE — TELEPHONE ENCOUNTER
Patient needs refill on allopurinol for his gout.  Not on med list anymore.  He wants to know has it been discontinued. Added and pended medication.  LOV 3/22/22. No appointment at this time.

## 2022-05-25 NOTE — TELEPHONE ENCOUNTER
Please send a clean refill prescription including the number of tabs and number of refills requested.  Thanks.

## 2022-05-25 NOTE — TELEPHONE ENCOUNTER
I do not know, but I do not see a diagnosis of gout nor do I see any uric acid levels checked.  I have restarted but we need to get a uric acid level and blood countfirst.  I will put the orders in.  This is not for an acute attack this is for gout prevention.

## 2022-06-10 ENCOUNTER — TELEPHONE (OUTPATIENT)
Dept: FAMILY MEDICINE | Facility: CLINIC | Age: 37
End: 2022-06-10

## 2022-08-12 ENCOUNTER — CLINICAL SUPPORT (OUTPATIENT)
Dept: OTHER | Facility: CLINIC | Age: 37
End: 2022-08-12
Payer: COMMERCIAL

## 2022-08-12 DIAGNOSIS — Z00.8 ENCOUNTER FOR OTHER GENERAL EXAMINATION: ICD-10-CM

## 2022-08-13 VITALS
WEIGHT: 238 LBS | DIASTOLIC BLOOD PRESSURE: 84 MMHG | HEIGHT: 71 IN | BODY MASS INDEX: 33.32 KG/M2 | SYSTOLIC BLOOD PRESSURE: 126 MMHG

## 2022-08-13 LAB
GLUCOSE SERPL-MCNC: 97 MG/DL (ref 60–140)
HDLC SERPL-MCNC: 27 MG/DL
POC CHOLESTEROL, LDL (DOCK): 78.46 MG/DL
POC CHOLESTEROL, TOTAL: 133 MG/DL
TRIGL SERPL-MCNC: 157 MG/DL

## 2022-09-08 DIAGNOSIS — E79.0 HYPERURICEMIA: ICD-10-CM

## 2022-09-08 RX ORDER — ALLOPURINOL 300 MG/1
300 TABLET ORAL DAILY
Qty: 30 TABLET | Refills: 0 | Status: SHIPPED | OUTPATIENT
Start: 2022-09-08 | End: 2022-09-28 | Stop reason: SDUPTHER

## 2022-09-28 ENCOUNTER — OFFICE VISIT (OUTPATIENT)
Dept: FAMILY MEDICINE | Facility: CLINIC | Age: 37
End: 2022-09-28
Payer: COMMERCIAL

## 2022-09-28 VITALS
TEMPERATURE: 99 F | DIASTOLIC BLOOD PRESSURE: 74 MMHG | WEIGHT: 241.5 LBS | BODY MASS INDEX: 33.81 KG/M2 | HEIGHT: 71 IN | OXYGEN SATURATION: 98 % | SYSTOLIC BLOOD PRESSURE: 118 MMHG | RESPIRATION RATE: 18 BRPM | HEART RATE: 83 BPM

## 2022-09-28 DIAGNOSIS — G04.81 AUTOIMMUNE ENCEPHALITIS: ICD-10-CM

## 2022-09-28 DIAGNOSIS — E79.0 HYPERURICEMIA: ICD-10-CM

## 2022-09-28 DIAGNOSIS — R56.9 SEIZURES: ICD-10-CM

## 2022-09-28 DIAGNOSIS — Z83.49 FAMILY HISTORY OF ALPHA 1 ANTITRYPSIN DEFICIENCY: ICD-10-CM

## 2022-09-28 DIAGNOSIS — E78.2 MIXED HYPERLIPIDEMIA: ICD-10-CM

## 2022-09-28 DIAGNOSIS — M10.9 GOUT, UNSPECIFIED CAUSE, UNSPECIFIED CHRONICITY, UNSPECIFIED SITE: Primary | ICD-10-CM

## 2022-09-28 PROCEDURE — 3078F PR MOST RECENT DIASTOLIC BLOOD PRESSURE < 80 MM HG: ICD-10-PCS | Mod: CPTII,S$GLB,, | Performed by: FAMILY MEDICINE

## 2022-09-28 PROCEDURE — 99213 PR OFFICE/OUTPT VISIT, EST, LEVL III, 20-29 MIN: ICD-10-PCS | Mod: S$GLB,,, | Performed by: FAMILY MEDICINE

## 2022-09-28 PROCEDURE — 3008F PR BODY MASS INDEX (BMI) DOCUMENTED: ICD-10-PCS | Mod: CPTII,S$GLB,, | Performed by: FAMILY MEDICINE

## 2022-09-28 PROCEDURE — 3044F PR MOST RECENT HEMOGLOBIN A1C LEVEL <7.0%: ICD-10-PCS | Mod: CPTII,S$GLB,, | Performed by: FAMILY MEDICINE

## 2022-09-28 PROCEDURE — 1159F MED LIST DOCD IN RCRD: CPT | Mod: CPTII,S$GLB,, | Performed by: FAMILY MEDICINE

## 2022-09-28 PROCEDURE — 3044F HG A1C LEVEL LT 7.0%: CPT | Mod: CPTII,S$GLB,, | Performed by: FAMILY MEDICINE

## 2022-09-28 PROCEDURE — 3078F DIAST BP <80 MM HG: CPT | Mod: CPTII,S$GLB,, | Performed by: FAMILY MEDICINE

## 2022-09-28 PROCEDURE — 3074F SYST BP LT 130 MM HG: CPT | Mod: CPTII,S$GLB,, | Performed by: FAMILY MEDICINE

## 2022-09-28 PROCEDURE — 3074F PR MOST RECENT SYSTOLIC BLOOD PRESSURE < 130 MM HG: ICD-10-PCS | Mod: CPTII,S$GLB,, | Performed by: FAMILY MEDICINE

## 2022-09-28 PROCEDURE — 3008F BODY MASS INDEX DOCD: CPT | Mod: CPTII,S$GLB,, | Performed by: FAMILY MEDICINE

## 2022-09-28 PROCEDURE — 1159F PR MEDICATION LIST DOCUMENTED IN MEDICAL RECORD: ICD-10-PCS | Mod: CPTII,S$GLB,, | Performed by: FAMILY MEDICINE

## 2022-09-28 PROCEDURE — 99213 OFFICE O/P EST LOW 20 MIN: CPT | Mod: S$GLB,,, | Performed by: FAMILY MEDICINE

## 2022-09-28 RX ORDER — DIVALPROEX SODIUM 500 MG/1
1 TABLET, DELAYED RELEASE ORAL
COMMUNITY
Start: 2022-06-23

## 2022-09-28 RX ORDER — ALLOPURINOL 300 MG/1
300 TABLET ORAL DAILY
Qty: 90 TABLET | Refills: 3 | Status: SHIPPED | OUTPATIENT
Start: 2022-09-28 | End: 2022-11-23

## 2022-09-28 NOTE — PROGRESS NOTES
Subjective:       Patient ID: Iván Amaral is a 37 y.o. male.    Chief Complaint: wellness visit (Would like to discuss medications- Allopurinol and Depakote)      Patient is here as he needs a refill on his allopurinol. No recent flair up.  Lab Results       Component                Value               Date                       WBC                      6.76                04/29/2022                 HGB                      13.7 (L)            04/29/2022                 HCT                      38.8 (L)            04/29/2022                 PLT                      353                 04/29/2022                 CHOL                     180                 04/19/2022                 TRIG                     257 (H)             04/25/2022                 HDL                      26 (L)              04/19/2022                 ALT                      33                  04/29/2022                 AST                      23                  04/29/2022                 NA                       141                 04/29/2022                 K                        4.1                 04/29/2022                 CL                       99                  04/29/2022                 CREATININE               1.3                 04/29/2022                 BUN                      12                  04/29/2022                 CO2                      26                  04/29/2022                 TSH                      3.270               04/19/2022                 INR                      1.1                 04/25/2022                 HGBA1C                   5.2                 04/25/2022            BP Readings from Last 3 Encounters:  09/28/22 : 118/74  08/12/22 : 126/84  04/29/22 : 125/89  Wt Readings from Last 4 Encounters:  09/28/22 : 109.5 kg (241 lb 8 oz)  08/12/22 : 108 kg (238 lb)  04/25/22 : 99.3 kg (218 lb 14.7 oz)  04/18/22 : 102.2 kg (225 lb 5 oz)              Allergies and Medications:   Review of  patient's allergies indicates:   Allergen Reactions    Pcn [penicillins]      Current Outpatient Medications   Medication Sig Dispense Refill    atorvastatin (LIPITOR) 40 MG tablet Take 1 tablet (40 mg total) by mouth once daily. 90 tablet 1    divalproex (DEPAKOTE) 500 MG TbEC 1 tablet.      multivitamin (THERAGRAN) per tablet Take 1 tablet by mouth once daily.      allopurinoL (ZYLOPRIM) 300 MG tablet Take 1 tablet (300 mg total) by mouth once daily. 90 tablet 3     No current facility-administered medications for this visit.       Family History:   Family History   Problem Relation Age of Onset    Diabetes Mother     Hypothyroidism Mother     Diabetes Father     Gout Father     Heart failure Father     Heart disease Father     Hypoglycemic Brother     Cancer Maternal Grandfather        Social History:   Social History     Socioeconomic History    Marital status: Single   Tobacco Use    Smoking status: Never    Smokeless tobacco: Never   Substance and Sexual Activity    Alcohol use: No    Drug use: No    Sexual activity: Not Currently     Social Determinants of Health     Financial Resource Strain: Low Risk     Difficulty of Paying Living Expenses: Not hard at all   Food Insecurity: No Food Insecurity    Worried About Running Out of Food in the Last Year: Never true    Ran Out of Food in the Last Year: Never true   Transportation Needs: No Transportation Needs    Lack of Transportation (Medical): No    Lack of Transportation (Non-Medical): No   Physical Activity: Sufficiently Active    Days of Exercise per Week: 5 days    Minutes of Exercise per Session: 60 min   Stress: Stress Concern Present    Feeling of Stress : To some extent   Social Connections: Unknown    Frequency of Communication with Friends and Family: Once a week    Frequency of Social Gatherings with Friends and Family: Once a week    Active Member of Clubs or Organizations: No    Attends Club or Organization Meetings: Never    Marital Status: Never     Housing Stability: Low Risk     Unable to Pay for Housing in the Last Year: No    Number of Places Lived in the Last Year: 1    Unstable Housing in the Last Year: No       Review of Systems   Constitutional:  Negative for activity change and unexpected weight change.   HENT:  Negative for hearing loss, rhinorrhea and trouble swallowing.    Eyes:  Negative for discharge and visual disturbance.   Respiratory:  Negative for chest tightness and wheezing.    Cardiovascular:  Negative for chest pain and palpitations.   Gastrointestinal:  Negative for blood in stool, constipation, diarrhea and vomiting.   Endocrine: Negative for polydipsia and polyuria.   Genitourinary:  Negative for difficulty urinating, hematuria and urgency.   Musculoskeletal:  Negative for arthralgias, joint swelling and neck pain.   Neurological:  Positive for headaches. Negative for weakness.   Psychiatric/Behavioral:  Positive for dysphoric mood. Negative for confusion.      Objective:     Vitals:    09/28/22 1539   BP: 118/74   Pulse: 83   Resp: 18   Temp: 98.6 °F (37 °C)        Physical Exam  Vitals and nursing note reviewed.   Constitutional:       General: He is not in acute distress.     Appearance: Normal appearance. He is well-developed. He is obese. He is not ill-appearing, toxic-appearing or diaphoretic.   HENT:      Head: Normocephalic.   Eyes:      Conjunctiva/sclera: Conjunctivae normal.      Pupils: Pupils are equal, round, and reactive to light.   Neck:      Vascular: No carotid bruit.   Cardiovascular:      Rate and Rhythm: Normal rate and regular rhythm.      Heart sounds: Normal heart sounds. No murmur heard.    No friction rub. No gallop.   Pulmonary:      Effort: Pulmonary effort is normal. No respiratory distress.      Breath sounds: Normal breath sounds. No stridor. No wheezing, rhonchi or rales.   Chest:      Chest wall: No tenderness.   Musculoskeletal:         General: Normal range of motion.      Cervical back:  Normal range of motion and neck supple. No rigidity or tenderness.      Comments: Full range of motion at the great toe with minimal crepitance   Lymphadenopathy:      Cervical: No cervical adenopathy.   Skin:     General: Skin is warm and dry.      Findings: No erythema.   Neurological:      Mental Status: He is alert.   Psychiatric:         Behavior: Behavior normal.         Thought Content: Thought content normal.         Judgment: Judgment normal.       Assessment:       1. Gout, unspecified cause, unspecified chronicity, unspecified site    2. Seizures    3. Mixed hyperlipidemia    4. Autoimmune encephalitis    5. Hyperuricemia    6. Family history of alpha 1 antitrypsin deficiency        Plan:       Iván was seen today for wellness visit.    Diagnoses and all orders for this visit:    Gout, unspecified cause, unspecified chronicity, unspecified site  -     Uric Acid; Future    Seizures  -     Ambulatory referral/consult to Neurology; Future  -     CBC Auto Differential; Future    Mixed hyperlipidemia  -     Lipid Panel; Future  -     Comprehensive Metabolic Panel; Future    Autoimmune encephalitis  -     Ambulatory referral/consult to Neurology; Future    Hyperuricemia  -     allopurinoL (ZYLOPRIM) 300 MG tablet; Take 1 tablet (300 mg total) by mouth once daily.    Family history of alpha 1 antitrypsin deficiency  -     Alpha 1 Antitrypsin Phenotype; Future       Follow up in about 6 months (around 3/28/2023) for annual.

## 2022-09-29 ENCOUNTER — LAB VISIT (OUTPATIENT)
Dept: LAB | Facility: HOSPITAL | Age: 37
End: 2022-09-29
Attending: FAMILY MEDICINE
Payer: COMMERCIAL

## 2022-09-29 ENCOUNTER — TELEPHONE (OUTPATIENT)
Dept: FAMILY MEDICINE | Facility: CLINIC | Age: 37
End: 2022-09-29

## 2022-09-29 ENCOUNTER — PATIENT MESSAGE (OUTPATIENT)
Dept: FAMILY MEDICINE | Facility: CLINIC | Age: 37
End: 2022-09-29

## 2022-09-29 DIAGNOSIS — E78.2 MIXED HYPERLIPIDEMIA: ICD-10-CM

## 2022-09-29 DIAGNOSIS — Z83.49 FAMILY HISTORY OF ALPHA 1 ANTITRYPSIN DEFICIENCY: ICD-10-CM

## 2022-09-29 DIAGNOSIS — M10.9 GOUT, UNSPECIFIED CAUSE, UNSPECIFIED CHRONICITY, UNSPECIFIED SITE: ICD-10-CM

## 2022-09-29 DIAGNOSIS — R56.9 SEIZURES: ICD-10-CM

## 2022-09-29 LAB
ALBUMIN SERPL BCP-MCNC: 4.4 G/DL (ref 3.5–5.2)
ALP SERPL-CCNC: 151 U/L (ref 55–135)
ALT SERPL W/O P-5'-P-CCNC: 60 U/L (ref 10–44)
ANION GAP SERPL CALC-SCNC: 8 MMOL/L (ref 8–16)
AST SERPL-CCNC: 32 U/L (ref 10–40)
BASOPHILS # BLD AUTO: 0.04 K/UL (ref 0–0.2)
BASOPHILS NFR BLD: 0.6 % (ref 0–1.9)
BILIRUB SERPL-MCNC: 0.6 MG/DL (ref 0.1–1)
BUN SERPL-MCNC: 18 MG/DL (ref 6–20)
CALCIUM SERPL-MCNC: 9.4 MG/DL (ref 8.7–10.5)
CHLORIDE SERPL-SCNC: 105 MMOL/L (ref 95–110)
CHOLEST SERPL-MCNC: 149 MG/DL (ref 120–199)
CHOLEST/HDLC SERPL: 5 {RATIO} (ref 2–5)
CO2 SERPL-SCNC: 27 MMOL/L (ref 23–29)
CREAT SERPL-MCNC: 1 MG/DL (ref 0.5–1.4)
DIFFERENTIAL METHOD: NORMAL
EOSINOPHIL # BLD AUTO: 0.3 K/UL (ref 0–0.5)
EOSINOPHIL NFR BLD: 4.4 % (ref 0–8)
ERYTHROCYTE [DISTWIDTH] IN BLOOD BY AUTOMATED COUNT: 12.4 % (ref 11.5–14.5)
EST. GFR  (NO RACE VARIABLE): >60 ML/MIN/1.73 M^2
GLUCOSE SERPL-MCNC: 108 MG/DL (ref 70–110)
HCT VFR BLD AUTO: 47.3 % (ref 40–54)
HDLC SERPL-MCNC: 30 MG/DL (ref 40–75)
HDLC SERPL: 20.1 % (ref 20–50)
HGB BLD-MCNC: 16 G/DL (ref 14–18)
IMM GRANULOCYTES # BLD AUTO: 0.02 K/UL (ref 0–0.04)
IMM GRANULOCYTES NFR BLD AUTO: 0.3 % (ref 0–0.5)
LDLC SERPL CALC-MCNC: 68 MG/DL (ref 63–159)
LYMPHOCYTES # BLD AUTO: 2.5 K/UL (ref 1–4.8)
LYMPHOCYTES NFR BLD: 36.4 % (ref 18–48)
MCH RBC QN AUTO: 29.1 PG (ref 27–31)
MCHC RBC AUTO-ENTMCNC: 33.8 G/DL (ref 32–36)
MCV RBC AUTO: 86 FL (ref 82–98)
MONOCYTES # BLD AUTO: 0.5 K/UL (ref 0.3–1)
MONOCYTES NFR BLD: 7.9 % (ref 4–15)
NEUTROPHILS # BLD AUTO: 3.5 K/UL (ref 1.8–7.7)
NEUTROPHILS NFR BLD: 50.4 % (ref 38–73)
NONHDLC SERPL-MCNC: 119 MG/DL
NRBC BLD-RTO: 0 /100 WBC
PLATELET # BLD AUTO: 313 K/UL (ref 150–450)
PMV BLD AUTO: 9.5 FL (ref 9.2–12.9)
POTASSIUM SERPL-SCNC: 4.3 MMOL/L (ref 3.5–5.1)
PROT SERPL-MCNC: 7.9 G/DL (ref 6–8.4)
RBC # BLD AUTO: 5.49 M/UL (ref 4.6–6.2)
SODIUM SERPL-SCNC: 140 MMOL/L (ref 136–145)
TRIGL SERPL-MCNC: 255 MG/DL (ref 30–150)
URATE SERPL-MCNC: 5.8 MG/DL (ref 3.4–7)
WBC # BLD AUTO: 6.84 K/UL (ref 3.9–12.7)

## 2022-09-29 PROCEDURE — 80053 COMPREHEN METABOLIC PANEL: CPT | Performed by: FAMILY MEDICINE

## 2022-09-29 PROCEDURE — 85025 COMPLETE CBC W/AUTO DIFF WBC: CPT | Performed by: FAMILY MEDICINE

## 2022-09-29 PROCEDURE — 36415 COLL VENOUS BLD VENIPUNCTURE: CPT | Performed by: FAMILY MEDICINE

## 2022-09-29 PROCEDURE — 80061 LIPID PANEL: CPT | Performed by: FAMILY MEDICINE

## 2022-09-29 PROCEDURE — 82103 ALPHA-1-ANTITRYPSIN TOTAL: CPT | Performed by: FAMILY MEDICINE

## 2022-09-29 PROCEDURE — 84550 ASSAY OF BLOOD/URIC ACID: CPT | Performed by: FAMILY MEDICINE

## 2022-09-29 NOTE — TELEPHONE ENCOUNTER
----- Message from Hugo Moran MD sent at 9/29/2022  9:29 AM CDT -----  Lab reviewed: all OK. Please notify pt. Continue pres meds.

## 2022-10-05 LAB
A1AT PHENOTYP SERPL IFE: NORMAL
A1AT SERPL-MCNC: 136 MG/DL (ref 95–164)

## 2022-10-06 NOTE — PROGRESS NOTES
The alpha-1 antitrypsin phenotype breakdown is predominantly normal with very small amounts of MS.  This will probably never correlate to clinical disease.  Apparently, MZ is the bad 1.

## 2022-10-10 ENCOUNTER — TELEPHONE (OUTPATIENT)
Dept: FAMILY MEDICINE | Facility: CLINIC | Age: 37
End: 2022-10-10

## 2022-11-15 ENCOUNTER — LAB VISIT (OUTPATIENT)
Dept: LAB | Facility: HOSPITAL | Age: 37
End: 2022-11-15
Attending: NURSE PRACTITIONER
Payer: COMMERCIAL

## 2022-11-15 DIAGNOSIS — R56.9 GENERALIZED-ONSET SEIZURES: Primary | ICD-10-CM

## 2022-11-15 LAB
ALBUMIN SERPL BCP-MCNC: 4.1 G/DL (ref 3.5–5.2)
ALP SERPL-CCNC: 173 U/L (ref 55–135)
ALT SERPL W/O P-5'-P-CCNC: 76 U/L (ref 10–44)
ANION GAP SERPL CALC-SCNC: 8 MMOL/L (ref 8–16)
AST SERPL-CCNC: 42 U/L (ref 10–40)
BILIRUB SERPL-MCNC: 0.9 MG/DL (ref 0.1–1)
BUN SERPL-MCNC: 12 MG/DL (ref 6–20)
CALCIUM SERPL-MCNC: 8.6 MG/DL (ref 8.7–10.5)
CHLORIDE SERPL-SCNC: 99 MMOL/L (ref 95–110)
CO2 SERPL-SCNC: 26 MMOL/L (ref 23–29)
CREAT SERPL-MCNC: 1 MG/DL (ref 0.5–1.4)
ERYTHROCYTE [DISTWIDTH] IN BLOOD BY AUTOMATED COUNT: 13 % (ref 11.5–14.5)
EST. GFR  (NO RACE VARIABLE): >60 ML/MIN/1.73 M^2
GLUCOSE SERPL-MCNC: 107 MG/DL (ref 70–110)
HCT VFR BLD AUTO: 42.3 % (ref 40–54)
HGB BLD-MCNC: 14.4 G/DL (ref 14–18)
MCH RBC QN AUTO: 29.4 PG (ref 27–31)
MCHC RBC AUTO-ENTMCNC: 34 G/DL (ref 32–36)
MCV RBC AUTO: 86 FL (ref 82–98)
PLATELET # BLD AUTO: 341 K/UL (ref 150–450)
PMV BLD AUTO: 9.6 FL (ref 9.2–12.9)
POTASSIUM SERPL-SCNC: 4.1 MMOL/L (ref 3.5–5.1)
PROT SERPL-MCNC: 7.7 G/DL (ref 6–8.4)
RBC # BLD AUTO: 4.9 M/UL (ref 4.6–6.2)
SODIUM SERPL-SCNC: 133 MMOL/L (ref 136–145)
VALPROATE SERPL-MCNC: 33.2 UG/ML (ref 50–100)
WBC # BLD AUTO: 7.63 K/UL (ref 3.9–12.7)

## 2022-11-15 PROCEDURE — 36415 COLL VENOUS BLD VENIPUNCTURE: CPT | Performed by: NURSE PRACTITIONER

## 2022-11-15 PROCEDURE — 85027 COMPLETE CBC AUTOMATED: CPT | Performed by: NURSE PRACTITIONER

## 2022-11-15 PROCEDURE — 80164 ASSAY DIPROPYLACETIC ACD TOT: CPT | Performed by: NURSE PRACTITIONER

## 2022-11-15 PROCEDURE — 80053 COMPREHEN METABOLIC PANEL: CPT | Performed by: NURSE PRACTITIONER

## 2022-11-30 NOTE — CARE UPDATE
Maintain ventilator settings at this time   04/25/22 4026   Patient Assessment/Suction   Level of Consciousness (AVPU) responds to pain   Respiratory Effort Normal;Unlabored   Expansion/Accessory Muscles/Retractions no use of accessory muscles;expansion symmetric   All Lung Fields Breath Sounds clear   Rhythm/Pattern, Respiratory assisted mechanically   PRE-TX-O2   O2 Device (Oxygen Therapy) ventilator   $ Is the patient on Low Flow Oxygen? Yes   Pulse Oximetry Type Continuous   $ Pulse Oximetry - Multiple Charge Pulse Oximetry - Multiple   Vent Select   Conventional Vent Y   Charged w/in last 24h YES   Preset Conventional Ventilator Settings   Vent ID [id number]   Vent Type    Humidity HME   Education   $ Education Ventilator Oxygen;15 min   Respiratory Evaluation   $ Care Plan Tech Time 15 min   $ Eval/Re-eval Charges Re-evaluation      no known allergies

## 2023-03-22 ENCOUNTER — OFFICE VISIT (OUTPATIENT)
Dept: FAMILY MEDICINE | Facility: CLINIC | Age: 38
End: 2023-03-22
Payer: COMMERCIAL

## 2023-03-22 VITALS
SYSTOLIC BLOOD PRESSURE: 118 MMHG | HEIGHT: 71 IN | BODY MASS INDEX: 33.74 KG/M2 | HEART RATE: 89 BPM | TEMPERATURE: 98 F | WEIGHT: 241 LBS | OXYGEN SATURATION: 97 % | RESPIRATION RATE: 18 BRPM | DIASTOLIC BLOOD PRESSURE: 82 MMHG

## 2023-03-22 DIAGNOSIS — E78.2 MIXED HYPERLIPIDEMIA: ICD-10-CM

## 2023-03-22 DIAGNOSIS — Z00.00 PREVENTATIVE HEALTH CARE: ICD-10-CM

## 2023-03-22 DIAGNOSIS — E66.9 CLASS 1 OBESITY WITH BODY MASS INDEX (BMI) OF 30.0 TO 30.9 IN ADULT, UNSPECIFIED OBESITY TYPE, UNSPECIFIED WHETHER SERIOUS COMORBIDITY PRESENT: ICD-10-CM

## 2023-03-22 DIAGNOSIS — F41.8 DEPRESSION WITH ANXIETY: Primary | ICD-10-CM

## 2023-03-22 PROBLEM — G25.79 SEROTONIN SYNDROME: Status: RESOLVED | Noted: 2022-04-24 | Resolved: 2023-03-22

## 2023-03-22 PROBLEM — G90.81 SEROTONIN SYNDROME: Status: RESOLVED | Noted: 2022-04-24 | Resolved: 2023-03-22

## 2023-03-22 PROCEDURE — 3008F PR BODY MASS INDEX (BMI) DOCUMENTED: ICD-10-PCS | Mod: CPTII,S$GLB,, | Performed by: FAMILY MEDICINE

## 2023-03-22 PROCEDURE — 3008F BODY MASS INDEX DOCD: CPT | Mod: CPTII,S$GLB,, | Performed by: FAMILY MEDICINE

## 2023-03-22 PROCEDURE — 3074F SYST BP LT 130 MM HG: CPT | Mod: CPTII,S$GLB,, | Performed by: FAMILY MEDICINE

## 2023-03-22 PROCEDURE — 1159F MED LIST DOCD IN RCRD: CPT | Mod: CPTII,S$GLB,, | Performed by: FAMILY MEDICINE

## 2023-03-22 PROCEDURE — 99214 OFFICE O/P EST MOD 30 MIN: CPT | Mod: S$GLB,,, | Performed by: FAMILY MEDICINE

## 2023-03-22 PROCEDURE — 99214 PR OFFICE/OUTPT VISIT, EST, LEVL IV, 30-39 MIN: ICD-10-PCS | Mod: S$GLB,,, | Performed by: FAMILY MEDICINE

## 2023-03-22 PROCEDURE — 3074F PR MOST RECENT SYSTOLIC BLOOD PRESSURE < 130 MM HG: ICD-10-PCS | Mod: CPTII,S$GLB,, | Performed by: FAMILY MEDICINE

## 2023-03-22 PROCEDURE — 1159F PR MEDICATION LIST DOCUMENTED IN MEDICAL RECORD: ICD-10-PCS | Mod: CPTII,S$GLB,, | Performed by: FAMILY MEDICINE

## 2023-03-22 PROCEDURE — 3079F PR MOST RECENT DIASTOLIC BLOOD PRESSURE 80-89 MM HG: ICD-10-PCS | Mod: CPTII,S$GLB,, | Performed by: FAMILY MEDICINE

## 2023-03-22 PROCEDURE — 3079F DIAST BP 80-89 MM HG: CPT | Mod: CPTII,S$GLB,, | Performed by: FAMILY MEDICINE

## 2023-03-22 RX ORDER — ESCITALOPRAM OXALATE 10 MG/1
10 TABLET ORAL DAILY
Qty: 30 TABLET | Refills: 11 | Status: SHIPPED | OUTPATIENT
Start: 2023-03-22 | End: 2024-03-27 | Stop reason: SDUPTHER

## 2023-03-22 NOTE — PROGRESS NOTES
Subjective:       Patient ID: Iván Amaral is a 38 y.o. male.    Chief Complaint: Anxiety, Depression, and Hyperlipidemia      She is here for follow-up on cholesterol and anxiety depression.  Patient is had no seizures or other symptoms since the original incident that he had and has been on Depakote for nearly a year.  Wt Readings from Last 3 Encounters:  03/22/23 : 109.3 kg (241 lb)  09/28/22 : 109.5 kg (241 lb 8 oz)  08/12/22 : 108 kg (238 lb)    Patient has a lesion on his mid back that is hardened in is not going away with local treatment.  Lab Results       Component                Value               Date                       WBC                      7.63                11/15/2022                 HGB                      14.4                11/15/2022                 HCT                      42.3                11/15/2022                 PLT                      341                 11/15/2022                 CHOL                     149                 09/29/2022                 TRIG                     255 (H)             09/29/2022                 HDL                      30 (L)              09/29/2022                 ALT                      76 (H)              11/15/2022                 AST                      42 (H)              11/15/2022                 NA                       133 (L)             11/15/2022                 K                        4.1                 11/15/2022                 CL                       99                  11/15/2022                 CREATININE               1.0                 11/15/2022                 BUN                      12                  11/15/2022                 CO2                      26                  11/15/2022                 TSH                      3.270               04/19/2022                 INR                      1.1                 04/25/2022                 HGBA1C                   5.2                 04/25/2022                 Anxiety  Presents for follow-up visit. Patient reports no chest pain, confusion or palpitations.       DepressionPatient is not experiencing: confusion and palpitations.      Hyperlipidemia  This is a chronic problem. The problem is controlled. Pertinent negatives include no chest pain.     Allergies and Medications:   Review of patient's allergies indicates:   Allergen Reactions    Pcn [penicillins]      Current Outpatient Medications   Medication Sig Dispense Refill    allopurinoL (ZYLOPRIM) 300 MG tablet TAKE 1 TABLET (300 MG TOTAL) BY MOUTH ONCE DAILY. 30 tablet 11    atorvastatin (LIPITOR) 40 MG tablet Take 1 tablet (40 mg total) by mouth once daily. 90 tablet 1    divalproex (DEPAKOTE) 500 MG TbEC 1 tablet.      multivitamin (THERAGRAN) per tablet Take 1 tablet by mouth once daily.      EScitalopram oxalate (LEXAPRO) 10 MG tablet Take 1 tablet (10 mg total) by mouth once daily. 30 tablet 11     No current facility-administered medications for this visit.       Family History:   Family History   Problem Relation Age of Onset    Diabetes Mother     Hypothyroidism Mother     Diabetes Father     Gout Father     Heart failure Father     Heart disease Father     Hypoglycemic Brother     Cancer Maternal Grandfather        Social History:   Social History     Socioeconomic History    Marital status: Single   Tobacco Use    Smoking status: Never    Smokeless tobacco: Never   Substance and Sexual Activity    Alcohol use: No    Drug use: No    Sexual activity: Not Currently     Social Determinants of Health     Financial Resource Strain: Low Risk     Difficulty of Paying Living Expenses: Not hard at all   Food Insecurity: No Food Insecurity    Worried About Running Out of Food in the Last Year: Never true    Ran Out of Food in the Last Year: Never true   Transportation Needs: No Transportation Needs    Lack of Transportation (Medical): No    Lack of Transportation (Non-Medical): No   Physical Activity: Sufficiently  Active    Days of Exercise per Week: 3 days    Minutes of Exercise per Session: 50 min   Stress: No Stress Concern Present    Feeling of Stress : Only a little   Social Connections: Unknown    Frequency of Communication with Friends and Family: Once a week    Frequency of Social Gatherings with Friends and Family: Twice a week    Active Member of Clubs or Organizations: No    Attends Club or Organization Meetings: Patient refused    Marital Status: Never    Housing Stability: Low Risk     Unable to Pay for Housing in the Last Year: No    Number of Places Lived in the Last Year: 1    Unstable Housing in the Last Year: No       Review of Systems   Constitutional:  Negative for activity change and unexpected weight change.   HENT:  Negative for hearing loss, rhinorrhea and trouble swallowing.    Eyes:  Negative for discharge and visual disturbance.   Respiratory:  Negative for chest tightness and wheezing.    Cardiovascular:  Negative for chest pain and palpitations.   Gastrointestinal:  Negative for blood in stool, constipation, diarrhea and vomiting.   Endocrine: Negative for polydipsia and polyuria.   Genitourinary:  Negative for difficulty urinating, hematuria and urgency.   Musculoskeletal:  Negative for arthralgias, joint swelling and neck pain.   Neurological:  Negative for weakness and headaches.   Psychiatric/Behavioral:  Positive for depression and dysphoric mood. Negative for confusion.      Objective:     Vitals:    03/22/23 1000   BP: 118/82   Pulse: 89   Resp: 18   Temp: 98 °F (36.7 °C)        Physical Exam  Pulmonary:           Assessment:       1. Depression with anxiety    2. Class 1 obesity with body mass index (BMI) of 30.0 to 30.9 in adult, unspecified obesity type, unspecified whether serious comorbidity present    3. Preventative health care    4. Mixed hyperlipidemia        Plan:       Iván was seen today for anxiety, depression and hyperlipidemia.    Diagnoses and all orders for this  visit:    Depression with anxiety  -     EScitalopram oxalate (LEXAPRO) 10 MG tablet; Take 1 tablet (10 mg total) by mouth once daily.    Class 1 obesity with body mass index (BMI) of 30.0 to 30.9 in adult, unspecified obesity type, unspecified whether serious comorbidity present  -     Hemoglobin A1C; Future    Preventative health care  -     Hemoglobin A1C; Future    Mixed hyperlipidemia  -     Lipid Panel; Future  -     Comprehensive Metabolic Panel; Future         Follow up in about 6 months (around 9/22/2023).

## 2023-03-22 NOTE — PATIENT INSTRUCTIONS
We understand that controlling diabetes can feel overwhelming at times. We are here to offer the tools and support to help you manage your diabetes and meet your health goals. Please reference the meal planning guide below.     Diabetic Meal Planning    About this topic  Healthy eating is an important part of keeping your diabetes in control. A balanced diet along with your diabetic drugs will help you control your blood sugar. The right portions of healthy foods may help keep your sugar within your goal range. It may also help to lower or maintain your weight, manage cholesterol, the amount of fat in your blood, and blood pressure.      Image(s)    What will the results be?  Healthy eating may help you lower your blood sugar and keep it in a safe range. Keeping your blood sugar in a safe range may lower your chances for long term problems from your diabetes. You may be less likely to have damage to your kidneys, heart, eyes, or nerves.    What changes to diet are needed?  Healthy eating means:  Eating a range of foods from all food groups.  Eating the right size portions. Read the nutrition facts on each food you eat.  Eating meals and snacks at the same time each day. Do not skip a meal or snack. Skipping meals may cause your blood sugar to go too low, especially if you are on drugs for your diabetes. Skipping meals can also cause you to eat too much at the next meal.  Talk to your diabetes educator about making a personal meal plan for you. They can also help you eat the foods you like by modifying them to be healthier.    Who should use this diet?  This diet is helpful to people with high blood sugar or diabetes.    What foods are good to eat?  It is important to make a healthy meal. Eat a variety of different foods in the right portion.  Fill half of your plate with non-starchy vegetables. Non-starchy vegetables include: Broccoli, green beans, carrots, greens (jaclyn, kale, mustard, turnip), onions, tomatoes,  asparagus, beets, cauliflower, celery, and cucumber. Non-starchy vegetables are high in fiber and low in carbohydrates. These will help keep you full for longer without raising your blood sugar.  Fill 1/4 of your plate with carbohydrates. Try to choose whole grain options. Whole-grain products have more fiber which will make you feel full. Carbohydrates include: Bread, rice, pasta, and starchy vegetables. Starchy vegetables include beans, potatoes, acorn squash, butternut squash, corn, and peas.  Fill 1/4 of your plate with protein. Choose low-fat cuts of meat like boneless, skinless chicken breast; pork loin; 90% lean beef; lean and skinless turkey meat; and fresh fish (not fried) such as tuna, salmon, or mackerel.  Add a low-fat or non-fat dairy product like 8 ounces (240 mL) of 1% or skim milk or 6 ounces (180 mL) of low-fat yogurt. Eat the recommended serving. Milk and yogurt have carbohydrates which raise your blood sugar.  Add a small piece of fruit or 1/2 cup (120 grams) of frozen or canned fruit. Choose canned fruits in juice or water. Fruits include apples, bananas, peaches, pears, pineapple, plums, and oranges. Eat the recommended serving. Fruit has carbohydrates which can raise your blood sugar.  Include healthy fats in your meal like olive oil, canola oil, avocado, and nuts.  Other good foods to include in your diet are:  Foods high in fiber. Adding fiber to your meals may help control your blood sugar and cholesterol levels. It may also help with weight loss and prevent belly problems. If you are younger than 50, it is recommended to get 25 to 38 grams per day. If you are older than 50, it is recommended to get 21 to 30 grams per day. Good sources of fiber include:  Nuts and seeds  Beans, peas, and other legumes  Whole-grain products  Fruits  Vegetables  Smart snacks in the right portion. Do not go too long in between meals. Ask your dietitian when you should have a snack in between your meals. Snack on  things like:  Nuts  Vegetables and low-fat dressing  Light popcorn  Low-fat cheese and crackers  1/2 sandwich    What foods should be limited or avoided?  You may need to limit the amount of some foods you eat and how often you eat them. Foods that should be limited include:  High fat or processed foods like:  Vásquez  Sausage  Hot dogs  Whole-fat dairy products  Potato chips  Foods high in sodium like:  Canned soups  Soy sauce and some salad dressings  Cured meats  Salted snack foods like pretzels  Olives  Fats and oils like:  Margarine  Salad dressing  Gravy  Lard or shortening  Foods high in sugar like:  Candy  Cookies  Cake  Ice cream  Table sugar  Soda  Juice drinks  Starches that are not whole grain like:  White rice  French fries  White pasta  White bread  Sugary cereals  Baked goods, pastries, croissants  Beer, wine, and mixed drinks (alcohol). Drink alcohol in moderation (1 drink per day or less for adult women and 2 drinks per day or less for adult men). Drinking alcohol can cause fluctuations in your blood sugar and interfere with how your diabetic drugs work. Talk to your doctor about how you can safely include alcohol into your diet.    What can be done to prevent this health problem?  Some people have a higher chance of developing diabetes than others. This is a life-long problem. You can still lead a normal life. Diabetes can be managed through diet, exercise, and drugs. It is important to have support from your family and friends to help you with your goals.    When do I need to call the doctor?  Blood sugar level is above 240 mg/dL for more than a day  Blood sugar level drops to less than 40 and does not respond to 15 grams of simple carbohydrate, like 4 glucose tablets or 1/2 cup (120 mL) of fruit juice  Trouble breathing  Very sleepy and trouble concentrating  Feeling very thirsty  Needing to urinate (pee) more than normal  Throw up more than once or have many loose stools  You are so sick you  cannot eat or drink  Fever over 101°F (38°C)  Questions about your diet plan  You are not feeling better in 2 to 3 days or you are feeling worse    Helpful tips  Plan ahead. Plan your meals and grocery list before going to the store. This will help you to choose foods that are good for you.  Pack a lunch. Take healthy meals and snacks with you to work.  Keep a food journal to help keep you on track. Take note of foods that keep your blood sugar in goal range. Also note foods and meals that raise or lower your blood sugar. There are apps for your phone that can help with this.  Visit a restaurant's website before eating out. You can see the menu options and nutritional facts. This way, you can see which items best fit into your diet plan. Call ahead if you have questions.    Disclaimer.This generalized information is a limited summary of diagnosis, treatment, and/or medication information. It is not meant to be comprehensive and should be used as a tool to help the user understand and/or assess potential diagnostic and treatment options. It does NOT include all information about conditions, treatments, medications, side effects, or risks that may apply to a specific patient. It is not intended to be medical advice or a substitute for the medical advice, diagnosis, or treatment of a health care provider based on the health care provider's examination and assessment of a patient's specific and unique circumstances. Patients must speak with a health care provider for complete information about their health, medical questions, and treatment options, including any risks or benefits regarding use of medications. This information does not endorse any treatments or medications as safe, effective, or approved for treating a specific patient. [employer] and its affiliates disclaim any warranty or liability relating to this information or the use thereof. The use of this information is governed by the Terms of Use,  available at Terms of Use. ©2022 [employer] and its affiliates and/or licensors. All rights reserved. Avoid anything with sugar in the 1st 3 ingredients including honey and syrup.  Avoid dried fruits like raise nodes and apricots.  Other fruits and vegetables are okay but the sugar content is higher in bananas and grapes.  Avoid large portions on your starchy foods:  Bread rice potatoes and pasta.

## 2023-03-24 ENCOUNTER — TELEPHONE (OUTPATIENT)
Dept: FAMILY MEDICINE | Facility: CLINIC | Age: 38
End: 2023-03-24

## 2023-03-24 ENCOUNTER — LAB VISIT (OUTPATIENT)
Dept: LAB | Facility: HOSPITAL | Age: 38
End: 2023-03-24
Attending: FAMILY MEDICINE
Payer: COMMERCIAL

## 2023-03-24 DIAGNOSIS — E66.9 CLASS 1 OBESITY WITH BODY MASS INDEX (BMI) OF 30.0 TO 30.9 IN ADULT, UNSPECIFIED OBESITY TYPE, UNSPECIFIED WHETHER SERIOUS COMORBIDITY PRESENT: ICD-10-CM

## 2023-03-24 DIAGNOSIS — E79.0 HYPERURICEMIA: ICD-10-CM

## 2023-03-24 DIAGNOSIS — Z00.00 PREVENTATIVE HEALTH CARE: ICD-10-CM

## 2023-03-24 DIAGNOSIS — E78.2 MIXED HYPERLIPIDEMIA: ICD-10-CM

## 2023-03-24 LAB
ALBUMIN SERPL BCP-MCNC: 4.4 G/DL (ref 3.5–5.2)
ALP SERPL-CCNC: 129 U/L (ref 55–135)
ALT SERPL W/O P-5'-P-CCNC: 42 U/L (ref 10–44)
ANION GAP SERPL CALC-SCNC: 9 MMOL/L (ref 8–16)
AST SERPL-CCNC: 25 U/L (ref 10–40)
BILIRUB SERPL-MCNC: 0.4 MG/DL (ref 0.1–1)
BUN SERPL-MCNC: 13 MG/DL (ref 6–20)
CALCIUM SERPL-MCNC: 9.5 MG/DL (ref 8.7–10.5)
CHLORIDE SERPL-SCNC: 103 MMOL/L (ref 95–110)
CHOLEST SERPL-MCNC: 144 MG/DL (ref 120–199)
CHOLEST/HDLC SERPL: 4.5 {RATIO} (ref 2–5)
CO2 SERPL-SCNC: 26 MMOL/L (ref 23–29)
CREAT SERPL-MCNC: 1.1 MG/DL (ref 0.5–1.4)
EST. GFR  (NO RACE VARIABLE): >60 ML/MIN/1.73 M^2
ESTIMATED AVG GLUCOSE: 123 MG/DL (ref 68–131)
GLUCOSE SERPL-MCNC: 110 MG/DL (ref 70–110)
HBA1C MFR BLD: 5.9 % (ref 4.5–6.2)
HDLC SERPL-MCNC: 32 MG/DL (ref 40–75)
HDLC SERPL: 22.2 % (ref 20–50)
LDLC SERPL CALC-MCNC: 88.6 MG/DL (ref 63–159)
NONHDLC SERPL-MCNC: 112 MG/DL
POTASSIUM SERPL-SCNC: 4.6 MMOL/L (ref 3.5–5.1)
PROT SERPL-MCNC: 8 G/DL (ref 6–8.4)
SODIUM SERPL-SCNC: 138 MMOL/L (ref 136–145)
TRIGL SERPL-MCNC: 117 MG/DL (ref 30–150)
URATE SERPL-MCNC: 6.6 MG/DL (ref 3.4–7)

## 2023-03-24 PROCEDURE — 83036 HEMOGLOBIN GLYCOSYLATED A1C: CPT | Performed by: FAMILY MEDICINE

## 2023-03-24 PROCEDURE — 80061 LIPID PANEL: CPT | Performed by: FAMILY MEDICINE

## 2023-03-24 PROCEDURE — 80053 COMPREHEN METABOLIC PANEL: CPT | Performed by: FAMILY MEDICINE

## 2023-03-24 PROCEDURE — 36415 COLL VENOUS BLD VENIPUNCTURE: CPT | Performed by: FAMILY MEDICINE

## 2023-03-24 PROCEDURE — 84550 ASSAY OF BLOOD/URIC ACID: CPT | Performed by: FAMILY MEDICINE

## 2023-03-24 NOTE — TELEPHONE ENCOUNTER
----- Message from Hugo Moran MD sent at 3/24/2023 10:05 AM CDT -----  Lab reviewed: all OK. Please notify pt. Continue pres meds.

## 2023-03-27 ENCOUNTER — PATIENT MESSAGE (OUTPATIENT)
Dept: FAMILY MEDICINE | Facility: CLINIC | Age: 38
End: 2023-03-27

## 2023-03-27 NOTE — TELEPHONE ENCOUNTER
I would give it at least 2 weeks -make sure you take the Lexapro in the morning and the other at night if possible.

## 2023-05-22 DIAGNOSIS — E78.2 MIXED HYPERLIPIDEMIA: Primary | ICD-10-CM

## 2023-05-23 RX ORDER — ATORVASTATIN CALCIUM 40 MG/1
40 TABLET, FILM COATED ORAL DAILY
Qty: 90 TABLET | Refills: 1 | Status: SHIPPED | OUTPATIENT
Start: 2023-05-23 | End: 2024-05-22

## 2023-07-20 ENCOUNTER — PATIENT MESSAGE (OUTPATIENT)
Dept: FAMILY MEDICINE | Facility: CLINIC | Age: 38
End: 2023-07-20

## 2023-07-20 DIAGNOSIS — M10.9 GOUT, UNSPECIFIED CAUSE, UNSPECIFIED CHRONICITY, UNSPECIFIED SITE: Primary | ICD-10-CM

## 2023-07-20 RX ORDER — COLCHICINE 0.6 MG/1
0.6 CAPSULE ORAL 2 TIMES DAILY
Qty: 60 CAPSULE | Refills: 2 | Status: SHIPPED | OUTPATIENT
Start: 2023-07-20 | End: 2023-10-18

## 2023-07-21 RX ORDER — COLCHICINE 0.6 MG/1
0.6 TABLET ORAL DAILY
Qty: 60 TABLET | Refills: 0 | Status: SHIPPED | OUTPATIENT
Start: 2023-07-21 | End: 2023-09-19

## 2023-07-21 NOTE — TELEPHONE ENCOUNTER
Uh oh. I went to  my prescription earlier and they said that because it was for capsules, it was like $75 even with insurance. In the past it was only like $20 something. They said the tablets are much cheaper. Any chance I can get the prescription swapped? Sorry to be such a bother.

## 2023-11-30 ENCOUNTER — LAB VISIT (OUTPATIENT)
Dept: LAB | Facility: HOSPITAL | Age: 38
End: 2023-11-30
Attending: STUDENT IN AN ORGANIZED HEALTH CARE EDUCATION/TRAINING PROGRAM
Payer: COMMERCIAL

## 2023-11-30 DIAGNOSIS — E56.9 MULTIPLE VITAMIN DEFICIENCY DISEASE: Primary | ICD-10-CM

## 2023-11-30 LAB
ALBUMIN SERPL BCP-MCNC: 4.3 G/DL (ref 3.5–5.2)
ALP SERPL-CCNC: 145 U/L (ref 55–135)
ALT SERPL W/O P-5'-P-CCNC: 35 U/L (ref 10–44)
ANION GAP SERPL CALC-SCNC: 5 MMOL/L (ref 8–16)
AST SERPL-CCNC: 19 U/L (ref 10–40)
BILIRUB SERPL-MCNC: 0.5 MG/DL (ref 0.1–1)
BUN SERPL-MCNC: 11 MG/DL (ref 6–20)
CALCIUM SERPL-MCNC: 8.9 MG/DL (ref 8.7–10.5)
CHLORIDE SERPL-SCNC: 105 MMOL/L (ref 95–110)
CO2 SERPL-SCNC: 28 MMOL/L (ref 23–29)
CREAT SERPL-MCNC: 1 MG/DL (ref 0.5–1.4)
ERYTHROCYTE [DISTWIDTH] IN BLOOD BY AUTOMATED COUNT: 13.1 % (ref 11.5–14.5)
EST. GFR  (NO RACE VARIABLE): >60 ML/MIN/1.73 M^2
GLUCOSE SERPL-MCNC: 103 MG/DL (ref 70–110)
HCT VFR BLD AUTO: 42.4 % (ref 40–54)
HGB BLD-MCNC: 14.5 G/DL (ref 14–18)
MCH RBC QN AUTO: 29.8 PG (ref 27–31)
MCHC RBC AUTO-ENTMCNC: 34.2 G/DL (ref 32–36)
MCV RBC AUTO: 87 FL (ref 82–98)
PLATELET # BLD AUTO: 335 K/UL (ref 150–450)
PMV BLD AUTO: 10 FL (ref 9.2–12.9)
POTASSIUM SERPL-SCNC: 4.2 MMOL/L (ref 3.5–5.1)
PROT SERPL-MCNC: 7.1 G/DL (ref 6–8.4)
RBC # BLD AUTO: 4.87 M/UL (ref 4.6–6.2)
SODIUM SERPL-SCNC: 138 MMOL/L (ref 136–145)
VALPROATE SERPL-MCNC: 12.6 UG/ML (ref 50–100)
WBC # BLD AUTO: 6.34 K/UL (ref 3.9–12.7)

## 2023-11-30 PROCEDURE — 85027 COMPLETE CBC AUTOMATED: CPT | Performed by: STUDENT IN AN ORGANIZED HEALTH CARE EDUCATION/TRAINING PROGRAM

## 2023-11-30 PROCEDURE — 36415 COLL VENOUS BLD VENIPUNCTURE: CPT | Performed by: STUDENT IN AN ORGANIZED HEALTH CARE EDUCATION/TRAINING PROGRAM

## 2023-11-30 PROCEDURE — 80053 COMPREHEN METABOLIC PANEL: CPT | Performed by: STUDENT IN AN ORGANIZED HEALTH CARE EDUCATION/TRAINING PROGRAM

## 2023-11-30 PROCEDURE — 80164 ASSAY DIPROPYLACETIC ACD TOT: CPT | Performed by: STUDENT IN AN ORGANIZED HEALTH CARE EDUCATION/TRAINING PROGRAM

## 2023-12-21 ENCOUNTER — PATIENT MESSAGE (OUTPATIENT)
Dept: FAMILY MEDICINE | Facility: CLINIC | Age: 38
End: 2023-12-21

## 2023-12-21 DIAGNOSIS — D22.9 SKIN MOLE: Primary | ICD-10-CM

## 2023-12-21 NOTE — TELEPHONE ENCOUNTER
Patient message    Hello,     INESSA while back during one of my routine visits I had Dr Moran check out a mole on my back to see if it was something I should worry about. At the time he told me I should see a dermatologist for a specialist opinion, and that he'd refer me, but I never got around to going because at the time it wasn't super urgent and I didn't have the money to spare. I'd like to move forward with getting it checked out now, though. I realized I never actually got a paper or anything for a referral. How do I go about contacting a dermatologist for an appointment?     Thanks

## 2023-12-22 ENCOUNTER — TELEPHONE (OUTPATIENT)
Dept: FAMILY MEDICINE | Facility: CLINIC | Age: 38
End: 2023-12-22

## 2023-12-26 ENCOUNTER — TELEPHONE (OUTPATIENT)
Dept: DERMATOLOGY | Facility: CLINIC | Age: 38
End: 2023-12-26

## 2023-12-26 NOTE — TELEPHONE ENCOUNTER
Derm referral: called patient, no answer voicemail left. Scheduled 1/2/2024 1430 [facility]  appointment; next available 6/2024. Explained in portal message why this was done without speaking with him and if not good date can be rescheduled via portal.

## 2024-01-02 ENCOUNTER — OFFICE VISIT (OUTPATIENT)
Dept: DERMATOLOGY | Facility: CLINIC | Age: 39
End: 2024-01-02
Payer: COMMERCIAL

## 2024-01-02 VITALS — WEIGHT: 241 LBS | BODY MASS INDEX: 33.74 KG/M2 | HEIGHT: 71 IN

## 2024-01-02 DIAGNOSIS — D48.5 NEOPLASM OF UNCERTAIN BEHAVIOR OF SKIN: ICD-10-CM

## 2024-01-02 PROCEDURE — 88305 TISSUE EXAM BY PATHOLOGIST: CPT | Performed by: PATHOLOGY

## 2024-01-02 PROCEDURE — 11104 PUNCH BX SKIN SINGLE LESION: CPT | Mod: S$GLB,,, | Performed by: DERMATOLOGY

## 2024-01-02 PROCEDURE — 88305 TISSUE EXAM BY PATHOLOGIST: CPT | Mod: 26,,, | Performed by: PATHOLOGY

## 2024-01-02 PROCEDURE — 99499 UNLISTED E&M SERVICE: CPT | Mod: S$GLB,,, | Performed by: DERMATOLOGY

## 2024-01-02 NOTE — PROGRESS NOTES
Subjective:      Patient ID:  Iván Amaral is a 38 y.o. male who presents for   Chief Complaint   Patient presents with    Spot     Back      New Patient    Patient here today for spot to back x 1 year.  Pt sates spot is painful to the touch, was soft now firm.    Derm Hx:  Denies Phx of NMSC  Denies Fhx of MM    Current Outpatient Medications:   ·  divalproex (DEPAKOTE) 500 MG TbEC, 1 tablet., Disp: , Rfl:   ·  EScitalopram oxalate (LEXAPRO) 10 MG tablet, Take 1 tablet (10 mg total) by mouth once daily., Disp: 30 tablet, Rfl: 11  ·  gentamicin (GARAMYCIN) 0.3 % ophthalmic solution, Place 1 drop in left eye 3 times a day for 5 days, Disp: 5 mL, Rfl: 0  ·  multivitamin (THERAGRAN) per tablet, Take 1 tablet by mouth once daily., Disp: , Rfl:   ·  allopurinoL (ZYLOPRIM) 300 MG tablet, Take 1 tablet (300 mg total) by mouth once daily., Disp: 90 tablet, Rfl: 3  ·  atorvastatin (LIPITOR) 40 MG tablet, TAKE 1 TABLET (40 MG TOTAL) BY MOUTH ONCE DAILY., Disp: 90 tablet, Rfl: 1  ·  colchicine (MITIGARE) 0.6 mg Cap, Take 1 capsule (0.6 mg total) by mouth 2 (two) times a day., Disp: 60 capsule, Rfl: 2        Review of Systems   Constitutional:  Negative for fever, chills and fatigue.   Skin:  Positive for activity-related sunscreen use. Negative for daily sunscreen use.       Objective:   Physical Exam   Constitutional: He appears well-developed and well-nourished.   Neurological: He is alert and oriented to person, place, and time.   Psychiatric: He has a normal mood and affect.   Skin:   Areas Examined (abnormalities noted in diagram):   Back Inspection Performed            Diagram Legend     Erythematous scaling macule/papule c/w actinic keratosis       Vascular papule c/w angioma      Pigmented verrucoid papule/plaque c/w seborrheic keratosis      Yellow umbilicated papule c/w sebaceous hyperplasia      Irregularly shaped tan macule c/w lentigo     1-2 mm smooth white papules consistent with Milia      Movable  subcutaneous cyst with punctum c/w epidermal inclusion cyst      Subcutaneous movable cyst c/w pilar cyst      Firm pink to brown papule c/w dermatofibroma      Pedunculated fleshy papule(s) c/w skin tag(s)      Evenly pigmented macule c/w junctional nevus     Mildly variegated pigmented, slightly irregular-bordered macule c/w mildly atypical nevus      Flesh colored to evenly pigmented papule c/w intradermal nevus       Pink pearly papule/plaque c/w basal cell carcinoma      Erythematous hyperkeratotic cursted plaque c/w SCC      Surgical scar with no sign of skin cancer recurrence      Open and closed comedones      Inflammatory papules and pustules      Verrucoid papule consistent consistent with wart     Erythematous eczematous patches and plaques     Dystrophic onycholytic nail with subungual debris c/w onychomycosis     Umbilicated papule    Erythematous-base heme-crusted tan verrucoid plaque consistent with inflamed seborrheic keratosis     Erythematous Silvery Scaling Plaque c/w Psoriasis     See annotation      Assessment / Plan:      Pathology Orders:       Normal Orders This Visit    Specimen to Pathology, Dermatology     Questions:    Procedure Type: Dermatology and skin neoplasms    Number of Specimens: 1    ------------------------: -------------------------    Spec 1 Procedure: Biopsy    Spec 1 Clinical Impression: Firm scar like dome shaped papule, DF vs other    Spec 1 Source: left upper back    Release to patient:           Neoplasm of uncertain behavior of skin  -     Ambulatory referral/consult to Dermatology  -     Specimen to Pathology, Dermatology    Punch biopsy procedure note:  Punch biopsy performed after verbal consent obtained. Area marked and prepped with alcohol. Approximately 1cc of 1% lidocaine with epinephrine injected. 8 mm disposable punch used to remove lesion. Hemostasis obtained and biopsy site closed with 4 Prolene sutures. Wound care instructions reviewed with patient and  handout given.           Follow up in about 2 weeks (around 1/16/2024) for suture removal.

## 2024-01-02 NOTE — PATIENT INSTRUCTIONS
Punch Biopsy Wound Care    Your doctor has performed a punch biopsy today.  A band aid and antibiotic ointment has been placed over the site.  This should remain in place for 24 hours.  It is recommended that you keep the area dry for the first 24 hours.  After 24 hours, you may remove the band aid and wash the area with warm soap and water and apply Vaseline jelly.  Many patients prefer to use Neosporin or Bacitracin ointment.  This is acceptable; however know that you can develop an allergy to this medication even if you have used it safely for years.  It is important to keep the area moist.  Letting it dry out and get air slows healing time, will worsen the scar, and make it more difficult to remove the stitches if they were placed.  Band aid is optional after first 24 hours.      If you notice increasing redness, tenderness, pain, or yellow drainage at the biopsy or surgical site, please notify your doctor.  These are signs of an infection.    If your biopsy/surgical site is bleeding, apply firm pressure for 15 minutes straight.  Repeat for another 15 minutes, if it is still bleeding.   If the surgical site continues to bleed, then please contact your doctor.     [facility] - DERMATOLOGY  [address], [address]  [place] [address]  Dept: [phone]  Dept Fax: [phone]

## 2024-01-04 LAB
FINAL PATHOLOGIC DIAGNOSIS: NORMAL
GROSS: NORMAL
Lab: NORMAL
MICROSCOPIC EXAM: NORMAL

## 2024-01-16 ENCOUNTER — CLINICAL SUPPORT (OUTPATIENT)
Dept: DERMATOLOGY | Facility: CLINIC | Age: 39
End: 2024-01-16
Payer: COMMERCIAL

## 2024-01-16 DIAGNOSIS — Z48.02 VISIT FOR SUTURE REMOVAL: Primary | ICD-10-CM

## 2024-01-16 PROCEDURE — 99024 POSTOP FOLLOW-UP VISIT: CPT | Mod: S$GLB,,, | Performed by: DERMATOLOGY

## 2024-02-25 NOTE — TELEPHONE ENCOUNTER
----- Message from Dorina Anderson sent at 11/13/2017  1:53 PM CST -----  Hep b vaccine   
25-Feb-2024 14:27

## 2024-03-26 ENCOUNTER — PATIENT MESSAGE (OUTPATIENT)
Dept: FAMILY MEDICINE | Facility: CLINIC | Age: 39
End: 2024-03-26
Payer: COMMERCIAL

## 2024-03-26 DIAGNOSIS — F41.8 DEPRESSION WITH ANXIETY: ICD-10-CM

## 2024-03-27 RX ORDER — ESCITALOPRAM OXALATE 10 MG/1
10 TABLET ORAL DAILY
Qty: 30 TABLET | Refills: 0 | Status: SHIPPED | OUTPATIENT
Start: 2024-03-27 | End: 2024-04-30 | Stop reason: SDUPTHER

## 2024-04-21 ENCOUNTER — PATIENT MESSAGE (OUTPATIENT)
Dept: ADMINISTRATIVE | Facility: OTHER | Age: 39
End: 2024-04-21
Payer: COMMERCIAL

## 2024-04-30 ENCOUNTER — PATIENT MESSAGE (OUTPATIENT)
Dept: FAMILY MEDICINE | Facility: CLINIC | Age: 39
End: 2024-04-30
Payer: COMMERCIAL

## 2024-04-30 DIAGNOSIS — F41.8 DEPRESSION WITH ANXIETY: ICD-10-CM

## 2024-05-03 RX ORDER — ESCITALOPRAM OXALATE 10 MG/1
10 TABLET ORAL DAILY
Qty: 30 TABLET | Refills: 0 | Status: SHIPPED | OUTPATIENT
Start: 2024-05-03 | End: 2025-05-03

## 2024-07-10 DIAGNOSIS — F41.8 DEPRESSION WITH ANXIETY: ICD-10-CM

## 2024-07-10 RX ORDER — ESCITALOPRAM OXALATE 10 MG/1
10 TABLET ORAL DAILY
Qty: 30 TABLET | Refills: 0 | OUTPATIENT
Start: 2024-07-10 | End: 2025-07-10

## 2024-07-11 ENCOUNTER — PATIENT MESSAGE (OUTPATIENT)
Dept: FAMILY MEDICINE | Facility: CLINIC | Age: 39
End: 2024-07-11
Payer: COMMERCIAL

## 2024-07-11 DIAGNOSIS — F41.8 DEPRESSION WITH ANXIETY: ICD-10-CM

## 2024-07-11 RX ORDER — ESCITALOPRAM OXALATE 10 MG/1
10 TABLET ORAL DAILY
Qty: 30 TABLET | Refills: 0 | Status: SHIPPED | OUTPATIENT
Start: 2024-07-11 | End: 2025-07-11

## 2024-07-24 ENCOUNTER — OFFICE VISIT (OUTPATIENT)
Dept: FAMILY MEDICINE | Facility: CLINIC | Age: 39
End: 2024-07-24
Payer: COMMERCIAL

## 2024-07-24 VITALS
RESPIRATION RATE: 16 BRPM | HEIGHT: 71 IN | SYSTOLIC BLOOD PRESSURE: 132 MMHG | BODY MASS INDEX: 35.11 KG/M2 | DIASTOLIC BLOOD PRESSURE: 72 MMHG | WEIGHT: 250.81 LBS | TEMPERATURE: 99 F | HEART RATE: 71 BPM | OXYGEN SATURATION: 97 %

## 2024-07-24 DIAGNOSIS — E78.2 MIXED HYPERLIPIDEMIA: Primary | ICD-10-CM

## 2024-07-24 DIAGNOSIS — M10.9 GOUT, UNSPECIFIED CAUSE, UNSPECIFIED CHRONICITY, UNSPECIFIED SITE: ICD-10-CM

## 2024-07-24 DIAGNOSIS — E55.9 VITAMIN D DEFICIENCY: ICD-10-CM

## 2024-07-24 DIAGNOSIS — R73.01 IMPAIRED FASTING BLOOD SUGAR: ICD-10-CM

## 2024-07-24 DIAGNOSIS — M79.10 MYALGIA: ICD-10-CM

## 2024-07-24 DIAGNOSIS — Z12.5 ENCOUNTER FOR PROSTATE CANCER SCREENING: ICD-10-CM

## 2024-07-24 PROCEDURE — 3075F SYST BP GE 130 - 139MM HG: CPT | Mod: CPTII,S$GLB,, | Performed by: FAMILY MEDICINE

## 2024-07-24 PROCEDURE — 99213 OFFICE O/P EST LOW 20 MIN: CPT | Mod: S$GLB,,, | Performed by: FAMILY MEDICINE

## 2024-07-24 PROCEDURE — 1159F MED LIST DOCD IN RCRD: CPT | Mod: CPTII,S$GLB,, | Performed by: FAMILY MEDICINE

## 2024-07-24 PROCEDURE — 99999 PR PBB SHADOW E&M-EST. PATIENT-LVL IV: CPT | Mod: PBBFAC,,, | Performed by: FAMILY MEDICINE

## 2024-07-24 PROCEDURE — 3008F BODY MASS INDEX DOCD: CPT | Mod: CPTII,S$GLB,, | Performed by: FAMILY MEDICINE

## 2024-07-24 PROCEDURE — 3078F DIAST BP <80 MM HG: CPT | Mod: CPTII,S$GLB,, | Performed by: FAMILY MEDICINE

## 2024-07-24 RX ORDER — GABAPENTIN 100 MG/1
100 CAPSULE ORAL 3 TIMES DAILY
Qty: 90 CAPSULE | Refills: 11 | Status: SHIPPED | OUTPATIENT
Start: 2024-07-24 | End: 2025-07-24

## 2024-07-24 NOTE — PROGRESS NOTES
Subjective:       Patient ID: Iván Amaral is a 39 y.o. male.    Chief Complaint: No chief complaint on file.      Is here to follow-up on his cholesterol.  Lab Results       Component                Value               Date                       WBC                      6.34                11/30/2023                 HGB                      14.5                11/30/2023                 HCT                      42.4                11/30/2023                 PLT                      335                 11/30/2023                 CHOL                     144                 03/24/2023                 TRIG                     117                 03/24/2023                 HDL                      32 (L)              03/24/2023                 ALT                      35                  11/30/2023                 AST                      19                  11/30/2023                 NA                       138                 11/30/2023                 K                        4.2                 11/30/2023                 CL                       105                 11/30/2023                 CREATININE               1.0                 11/30/2023                 BUN                      11                  11/30/2023                 CO2                      28                  11/30/2023                 TSH                      3.270               04/19/2022                 INR                      1.1                 04/25/2022                 HGBA1C                   5.9                 03/24/2023            Stopped taking depakote.   Wt Readings from Last 4 Encounters:  07/24/24 : 113.8 kg (250 lb 12.8 oz)  01/02/24 : 109.3 kg (241 lb)  03/22/23 : 109.3 kg (241 lb)  09/28/22 : 109.5 kg (241 lb 8 oz)            Allergies and Medications:   Review of patient's allergies indicates:   Allergen Reactions    Pcn [penicillins]      Current Outpatient Medications   Medication Sig Dispense Refill    allopurinoL  (ZYLOPRIM) 300 MG tablet Take 1 tablet (300 mg total) by mouth once daily. 90 tablet 0    atorvastatin (LIPITOR) 40 MG tablet Take 1 tablet (40 mg total) by mouth once daily. 90 tablet 0    EScitalopram oxalate (LEXAPRO) 10 MG tablet Take 1 tablet (10 mg total) by mouth once daily. 30 tablet 0    multivitamin (THERAGRAN) per tablet Take 1 tablet by mouth once daily.      colchicine (MITIGARE) 0.6 mg Cap Take 1 capsule (0.6 mg total) by mouth 2 (two) times a day. 60 capsule 2    gabapentin (NEURONTIN) 100 MG capsule Take 1 capsule (100 mg total) by mouth 3 (three) times daily. 90 capsule 11     No current facility-administered medications for this visit.       Family History:   Family History   Problem Relation Name Age of Onset    Diabetes Mother      Hypothyroidism Mother      Diabetes Father      Gout Father      Heart failure Father      Heart disease Father      Hypoglycemic Brother      Cancer Maternal Grandfather      Melanoma Maternal Grandfather         Social History:   Social History     Socioeconomic History    Marital status: Single   Tobacco Use    Smoking status: Never    Smokeless tobacco: Never   Substance and Sexual Activity    Alcohol use: No    Drug use: No    Sexual activity: Not Currently     Social Determinants of Health     Financial Resource Strain: Low Risk  (3/22/2023)    Overall Financial Resource Strain (CARDIA)     Difficulty of Paying Living Expenses: Not hard at all   Food Insecurity: No Food Insecurity (3/22/2023)    Hunger Vital Sign     Worried About Running Out of Food in the Last Year: Never true     Ran Out of Food in the Last Year: Never true   Transportation Needs: No Transportation Needs (3/22/2023)    PRAPARE - Transportation     Lack of Transportation (Medical): No     Lack of Transportation (Non-Medical): No   Physical Activity: Sufficiently Active (3/22/2023)    Exercise Vital Sign     Days of Exercise per Week: 3 days     Minutes of Exercise per Session: 50 min   Stress:  No Stress Concern Present (3/22/2023)    [employer] - Occupational Stress Questionnaire     Feeling of Stress : Only a little   Housing Stability: Low Risk  (3/22/2023)    Housing Stability Vital Sign     Unable to Pay for Housing in the Last Year: No     Number of Places Lived in the Last Year: 1     Unstable Housing in the Last Year: No       Review of Systems    Objective:     Vitals:    07/24/24 1042   BP: 132/72   Pulse: 71   Resp: 16   Temp: 98.6 °F (37 °C)        Physical Exam  Vitals and nursing note reviewed.   Constitutional:       Appearance: He is well-developed. He is not diaphoretic.   HENT:      Head: Normocephalic.   Eyes:      Conjunctiva/sclera: Conjunctivae normal.      Pupils: Pupils are equal, round, and reactive to light.   Cardiovascular:      Rate and Rhythm: Normal rate and regular rhythm.      Heart sounds: Normal heart sounds. No murmur heard.     No friction rub. No gallop.   Pulmonary:      Effort: Pulmonary effort is normal. No respiratory distress.      Breath sounds: Normal breath sounds. No stridor. No wheezing, rhonchi or rales.   Chest:      Chest wall: No tenderness.   Musculoskeletal:        Arms:    Skin:     General: Skin is warm and dry.   Psychiatric:         Behavior: Behavior normal.         Thought Content: Thought content normal.         Judgment: Judgment normal.         Assessment:       1. Mixed hyperlipidemia    2. Impaired fasting blood sugar    3. Encounter for prostate cancer screening    4. Vitamin D deficiency    5. Myalgia    6. Gout, unspecified cause, unspecified chronicity, unspecified site        Plan:       Diagnoses and all orders for this visit:    Mixed hyperlipidemia  -     Lipid Panel; Future  -     Comprehensive Metabolic Panel; Future    Impaired fasting blood sugar  -     Hemoglobin A1C; Future    Encounter for prostate cancer screening  -     PSA, Screening; Future    Vitamin D deficiency  -     Vitamin D;  Future    Myalgia  -     Magnesium; Future  -     gabapentin (NEURONTIN) 100 MG capsule; Take 1 capsule (100 mg total) by mouth 3 (three) times daily.    Gout, unspecified cause, unspecified chronicity, unspecified site         Follow up in about 6 months (around 1/24/2025) for annual.

## 2024-07-24 NOTE — PATIENT INSTRUCTIONS
We understand that controlling diabetes can feel overwhelming at times. We are here to offer the tools and support to help you manage your diabetes and meet your health goals. Please reference the meal planning guide below.     Ice Massage    Fill a dozen 6 oz. Paper cups with water and freeze them. When ready for massage, peel the paper from one frozen cup and wet it. Patient lies on stomach while the masseur rolls the ice cylinder over the sore tight muscles. When the patient can no longer tolerate the massage (usually 5-10 minutes), discard the ice. Patient lies prone for another five minutes to allow warming and blood flow into the muscles. Repeat 2-3 times per day.             [facility]  Diabetic Meal Planning    About this topic  Healthy eating is an important part of keeping your diabetes in control. A balanced diet along with your diabetic drugs will help you control your blood sugar. The right portions of healthy foods may help keep your sugar within your goal range. It may also help to lower or maintain your weight, manage cholesterol, the amount of fat in your blood, and blood pressure.      Image(s)    What will the results be?  Healthy eating may help you lower your blood sugar and keep it in a safe range. Keeping your blood sugar in a safe range may lower your chances for long term problems from your diabetes. You may be less likely to have damage to your kidneys, heart, eyes, or nerves.    What changes to diet are needed?  Healthy eating means:  Eating a range of foods from all food groups.  Eating the right size portions. Read the nutrition facts on each food you eat.  Eating meals and snacks at the same time each day. Do not skip a meal or snack. Skipping meals may cause your blood sugar to go too low, especially if you are on drugs for your diabetes. Skipping meals can also cause you to eat too much at the next meal.  Talk to your diabetes educator about making a personal meal plan for you. They  can also help you eat the foods you like by modifying them to be healthier.    Who should use this diet?  This diet is helpful to people with high blood sugar or diabetes.    What foods are good to eat?  It is important to make a healthy meal. Eat a variety of different foods in the right portion.  Fill half of your plate with non-starchy vegetables. Non-starchy vegetables include: Broccoli, green beans, carrots, greens (jaclyn, kale, mustard, turnip), onions, tomatoes, asparagus, beets, cauliflower, celery, and cucumber. Non-starchy vegetables are high in fiber and low in carbohydrates. These will help keep you full for longer without raising your blood sugar.  Fill 1/4 of your plate with carbohydrates. Try to choose whole grain options. Whole-grain products have more fiber which will make you feel full. Carbohydrates include: Bread, rice, pasta, and starchy vegetables. Starchy vegetables include beans, potatoes, acorn squash, butternut squash, corn, and peas.  Fill 1/4 of your plate with protein. Choose low-fat cuts of meat like boneless, skinless chicken breast; pork loin; 90% lean beef; lean and skinless turkey meat; and fresh fish (not fried) such as tuna, salmon, or mackerel.  Add a low-fat or non-fat dairy product like 8 ounces (240 mL) of 1% or skim milk or 6 ounces (180 mL) of low-fat yogurt. Eat the recommended serving. Milk and yogurt have carbohydrates which raise your blood sugar.  Add a small piece of fruit or 1/2 cup (120 grams) of frozen or canned fruit. Choose canned fruits in juice or water. Fruits include apples, bananas, peaches, pears, pineapple, plums, and oranges. Eat the recommended serving. Fruit has carbohydrates which can raise your blood sugar.  Include healthy fats in your meal like olive oil, canola oil, avocado, and nuts.  Other good foods to include in your diet are:  Foods high in fiber. Adding fiber to your meals may help control your blood sugar and cholesterol levels. It may also  help with weight loss and prevent belly problems. If you are younger than 50, it is recommended to get 25 to 38 grams per day. If you are older than 50, it is recommended to get 21 to 30 grams per day. Good sources of fiber include:  Nuts and seeds  Beans, peas, and other legumes  Whole-grain products  Fruits  Vegetables  Smart snacks in the right portion. Do not go too long in between meals. Ask your dietitian when you should have a snack in between your meals. Snack on things like:  Nuts  Vegetables and low-fat dressing  Light popcorn  Low-fat cheese and crackers  1/2 sandwich    What foods should be limited or avoided?  You may need to limit the amount of some foods you eat and how often you eat them. Foods that should be limited include:  High fat or processed foods like:  Vásquez  Sausage  Hot dogs  Whole-fat dairy products  Potato chips  Foods high in sodium like:  Canned soups  Soy sauce and some salad dressings  Cured meats  Salted snack foods like pretzels  Olives  Fats and oils like:  Margarine  Salad dressing  Gravy  Lard or shortening  Foods high in sugar like:  Candy  Cookies  Cake  Ice cream  Table sugar  Soda  Juice drinks  Starches that are not whole grain like:  White rice  French fries  White pasta  White bread  Sugary cereals  Baked goods, pastries, croissants  Beer, wine, and mixed drinks (alcohol). Drink alcohol in moderation (1 drink per day or less for adult women and 2 drinks per day or less for adult men). Drinking alcohol can cause fluctuations in your blood sugar and interfere with how your diabetic drugs work. Talk to your doctor about how you can safely include alcohol into your diet.    What can be done to prevent this health problem?  Some people have a higher chance of developing diabetes than others. This is a life-long problem. You can still lead a normal life. Diabetes can be managed through diet, exercise, and drugs. It is important to have support from your family and friends to  help you with your goals.    When do I need to call the doctor?  Blood sugar level is above 240 mg/dL for more than a day  Blood sugar level drops to less than 40 and does not respond to 15 grams of simple carbohydrate, like 4 glucose tablets or 1/2 cup (120 mL) of fruit juice  Trouble breathing  Very sleepy and trouble concentrating  Feeling very thirsty  Needing to urinate (pee) more than normal  Throw up more than once or have many loose stools  You are so sick you cannot eat or drink  Fever over 101°F (38°C)  Questions about your diet plan  You are not feeling better in 2 to 3 days or you are feeling worse    Helpful tips  Plan ahead. Plan your meals and grocery list before going to the store. This will help you to choose foods that are good for you.  Pack a lunch. Take healthy meals and snacks with you to work.  Keep a food journal to help keep you on track. Take note of foods that keep your blood sugar in goal range. Also note foods and meals that raise or lower your blood sugar. There are apps for your phone that can help with this.  Visit a restaurant's website before eating out. You can see the menu options and nutritional facts. This way, you can see which items best fit into your diet plan. Call ahead if you have questions.    Disclaimer.This generalized information is a limited summary of diagnosis, treatment, and/or medication information. It is not meant to be comprehensive and should be used as a tool to help the user understand and/or assess potential diagnostic and treatment options. It does NOT include all information about conditions, treatments, medications, side effects, or risks that may apply to a specific patient. It is not intended to be medical advice or a substitute for the medical advice, diagnosis, or treatment of a health care provider based on the health care provider's examination and assessment of a patient's specific and unique circumstances. Patients must speak with a health care  provider for complete information about their health, medical questions, and treatment options, including any risks or benefits regarding use of medications. This information does not endorse any treatments or medications as safe, effective, or approved for treating a specific patient. [employer] and its affiliates disclaim any warranty or liability relating to this information or the use thereof. The use of this information is governed by the Terms of Use, available at Terms of Use. ©2022 [employer] and its affiliates and/or licensors. All rights reserved. Avoid anything with sugar in the 1st 3 ingredients including honey and syrup.  Avoid dried fruits like raise nodes and apricots.  Other fruits and vegetables are okay but the sugar content is higher in bananas and grapes.  Avoid large portions on your starchy foods:  Bread rice potatoes and pasta. Restrict portions on carbohydrates, especially rice bread pasta and potatoes, to a fist-sized portion per meal.

## 2024-08-12 ENCOUNTER — PATIENT MESSAGE (OUTPATIENT)
Dept: FAMILY MEDICINE | Facility: CLINIC | Age: 39
End: 2024-08-12
Payer: COMMERCIAL

## 2024-08-12 DIAGNOSIS — F41.8 DEPRESSION WITH ANXIETY: ICD-10-CM

## 2024-08-12 RX ORDER — ESCITALOPRAM OXALATE 10 MG/1
10 TABLET ORAL DAILY
Qty: 90 TABLET | Refills: 0 | Status: SHIPPED | OUTPATIENT
Start: 2024-08-12 | End: 2025-08-12

## 2024-08-12 RX ORDER — ESCITALOPRAM OXALATE 10 MG/1
10 TABLET ORAL DAILY
Qty: 90 TABLET | Refills: 1 | Status: SHIPPED | OUTPATIENT
Start: 2024-08-12 | End: 2025-08-12

## 2024-08-13 ENCOUNTER — PATIENT MESSAGE (OUTPATIENT)
Dept: FAMILY MEDICINE | Facility: CLINIC | Age: 39
End: 2024-08-13
Payer: COMMERCIAL

## 2024-08-14 DIAGNOSIS — E78.2 MIXED HYPERLIPIDEMIA: ICD-10-CM

## 2024-08-15 RX ORDER — ATORVASTATIN CALCIUM 40 MG/1
40 TABLET, FILM COATED ORAL DAILY
Qty: 90 TABLET | Refills: 1 | Status: SHIPPED | OUTPATIENT
Start: 2024-08-15

## 2024-11-09 ENCOUNTER — E-VISIT (OUTPATIENT)
Dept: FAMILY MEDICINE | Facility: CLINIC | Age: 39
End: 2024-11-09
Payer: COMMERCIAL

## 2024-11-09 DIAGNOSIS — F41.8 DEPRESSION WITH ANXIETY: ICD-10-CM

## 2024-11-09 DIAGNOSIS — M1A.00X0 IDIOPATHIC CHRONIC GOUT WITHOUT TOPHUS, UNSPECIFIED SITE: Primary | ICD-10-CM

## 2024-11-09 DIAGNOSIS — E79.0 HYPERURICEMIA: ICD-10-CM

## 2024-11-09 DIAGNOSIS — E79.0 HYPERURICEMIA: Primary | ICD-10-CM

## 2024-11-10 RX ORDER — ALLOPURINOL 300 MG/1
300 TABLET ORAL DAILY
Qty: 90 TABLET | Refills: 3 | Status: SHIPPED | OUTPATIENT
Start: 2024-11-10 | End: 2025-11-10

## 2024-11-10 NOTE — PROGRESS NOTES
Patient ID: Iván Amaral is a 39 y.o. male.    Chief Complaint: General Illness (Entered automatically based on patient selection in [facility].)    The patient initiated a request through [facility] on 11/9/2024 for evaluation and management with a chief complaint of General Illness (Entered automatically based on patient selection in [facility].)     I evaluated the questionnaire submission on 11/10/24.    [facility]Medication    11/9/2024  9:21 AM CST - Filed by Patient   What do you need help with? Medication Request   Do you agree to participate in an E-Visit? Yes   If you have any of the following symptoms, please present to your local emergency room or call 911:  I acknowledge   Medication requests for narcotics will not be addressed via an E-Visit.  Please schedule an appointment. I acknowledge   Do you want to address a new or existing medication? I would like to address a medication I currently take   What is the main issue you would like addressed today? I need Allopurinol refilled but it has disappeared from my medication list   Would you like to change or continue your medication? Continue medication   What medication would you like to continue?  Allopurinol   Are you taking it as prescribed? Yes    What medical condition is the  medication intended to treat? Gout   Is the medication helping your condition? Yes   Are you having any side effects from the medication? No   Provide any additional information you feel is important.    Please attach any relevant images or files    Are you able to take your vital signs? No     Review of the records looks like you were changed from allopurinol to colchicine 0.6 b.i.d. in July of 2023.  At this time you are having an acute gout flare and will usually do his puts you on colchicine once a day and then switch back to allopurinol after 2 weeks when the flare has subsided.  I am perfectly okay with getting you back on the allopurinol daily but you  should keep the colchicine in the cabinet in case you do have a flare.  So when you have a flare you stop the allopurinol start the colchicine and then switch back to the allopurinol after the 2 week.  So continue the allopurinol for now and will recheck her uric acid level on your next office visit.      Encounter Diagnoses   Name Primary?    Idiopathic chronic gout without tophus, unspecified site Yes    Hyperuricemia         No orders of the defined types were placed in this encounter.     Medications Ordered This Encounter   Medications    allopurinoL (ZYLOPRIM) 300 MG tablet     Sig: Take 1 tablet (300 mg total) by mouth once daily.     Dispense:  90 tablet     Refill:  3        Follow up in about 2 months (around 1/9/2025), or As scheduled in January.      E-Visit Time Tracking:10'

## 2024-11-11 RX ORDER — ESCITALOPRAM OXALATE 10 MG/1
10 TABLET ORAL DAILY
Qty: 90 TABLET | Refills: 1 | Status: SHIPPED | OUTPATIENT
Start: 2024-11-11 | End: 2025-11-11

## 2024-11-11 RX ORDER — ALLOPURINOL 300 MG/1
300 TABLET ORAL
Qty: 90 TABLET | Refills: 1 | Status: SHIPPED | OUTPATIENT
Start: 2024-11-11

## 2025-01-24 ENCOUNTER — OFFICE VISIT (OUTPATIENT)
Dept: FAMILY MEDICINE | Facility: CLINIC | Age: 40
End: 2025-01-24
Payer: COMMERCIAL

## 2025-01-24 VITALS
SYSTOLIC BLOOD PRESSURE: 135 MMHG | BODY MASS INDEX: 34.89 KG/M2 | TEMPERATURE: 98 F | RESPIRATION RATE: 17 BRPM | HEIGHT: 71 IN | HEART RATE: 87 BPM | WEIGHT: 249.19 LBS | DIASTOLIC BLOOD PRESSURE: 86 MMHG

## 2025-01-24 DIAGNOSIS — E78.2 MIXED HYPERLIPIDEMIA: ICD-10-CM

## 2025-01-24 DIAGNOSIS — R40.0 DAYTIME SOMNOLENCE: ICD-10-CM

## 2025-01-24 DIAGNOSIS — R56.9 SEIZURES: ICD-10-CM

## 2025-01-24 DIAGNOSIS — Z12.5 ENCOUNTER FOR PROSTATE CANCER SCREENING: ICD-10-CM

## 2025-01-24 DIAGNOSIS — E79.0 HYPERURICEMIA: ICD-10-CM

## 2025-01-24 DIAGNOSIS — Z00.00 PREVENTATIVE HEALTH CARE: Primary | Chronic | ICD-10-CM

## 2025-01-24 PROCEDURE — 1160F RVW MEDS BY RX/DR IN RCRD: CPT | Mod: CPTII,S$GLB,, | Performed by: FAMILY MEDICINE

## 2025-01-24 PROCEDURE — 3079F DIAST BP 80-89 MM HG: CPT | Mod: CPTII,S$GLB,, | Performed by: FAMILY MEDICINE

## 2025-01-24 PROCEDURE — 3075F SYST BP GE 130 - 139MM HG: CPT | Mod: CPTII,S$GLB,, | Performed by: FAMILY MEDICINE

## 2025-01-24 PROCEDURE — 99396 PREV VISIT EST AGE 40-64: CPT | Mod: S$GLB,,, | Performed by: FAMILY MEDICINE

## 2025-01-24 PROCEDURE — 1159F MED LIST DOCD IN RCRD: CPT | Mod: CPTII,S$GLB,, | Performed by: FAMILY MEDICINE

## 2025-01-24 PROCEDURE — 3008F BODY MASS INDEX DOCD: CPT | Mod: CPTII,S$GLB,, | Performed by: FAMILY MEDICINE

## 2025-01-24 PROCEDURE — 99999 PR PBB SHADOW E&M-EST. PATIENT-LVL IV: CPT | Mod: PBBFAC,,, | Performed by: FAMILY MEDICINE

## 2025-01-24 NOTE — PROGRESS NOTES
Subjective:       Patient ID: Iván Ewing is a 39 y.o. male.    Chief Complaint: Annual Exam      History of Present Illness    CHIEF COMPLAINT:  Mr. Ewing presents for an annual wellness visit.    HPI:  Mr. Ewing is here for his annual well visit. The practitioner observes that the patient's abdomen is larger than expected. Mr. Ewing intends to attempt weight loss this year but lacks a specific plan. He recently switched to zero sugar drinks, which he considers a positive start, as he previously consumed large quantities of soda.    Mr. Ewing denies any skin lesions on the head, neck, and back. Mr. Ewing denies being diabetic.    TEST RESULTS:  Mr. Ewing previously completed an A1C test, which yielded normal results.    SOCIAL HISTORY:  Mr. Ewing previously consumed large amounts of soda. He has now switched to drinking zero sugar beverages.      ROS:  Integumentary: -rash          Allergies and Medications:   Review of patient's allergies indicates:   Allergen Reactions    Pcn [penicillins]      Current Outpatient Medications   Medication Sig Dispense Refill    allopurinoL (ZYLOPRIM) 300 MG tablet Take 1 tablet (300 mg total) by mouth once daily. 90 tablet 3    atorvastatin (LIPITOR) 40 MG tablet Take 1 tablet (40 mg total) by mouth once daily. 90 tablet 1    colchicine (MITIGARE) 0.6 mg Cap Take 1 capsule (0.6 mg total) by mouth 2 (two) times a day. 60 capsule 2    EScitalopram oxalate (LEXAPRO) 10 MG tablet Take 1 tablet (10 mg total) by mouth once daily. 90 tablet 1    multivitamin (THERAGRAN) per tablet Take 1 tablet by mouth once daily.       No current facility-administered medications for this visit.       Family History:   Family History   Problem Relation Name Age of Onset    Diabetes Mother      Hypothyroidism Mother      Diabetes Father      Gout Father      Heart failure Father      Heart disease Father      Hypoglycemic Brother      Cancer Maternal Grandfather      Melanoma Maternal  Grandfather         Social History:   Social History     Socioeconomic History    Marital status: Single   Tobacco Use    Smoking status: Never    Smokeless tobacco: Never   Substance and Sexual Activity    Alcohol use: No    Drug use: No    Sexual activity: Not Currently     Social Drivers of Health     Financial Resource Strain: Low Risk  (1/24/2025)    Overall Financial Resource Strain (CARDIA)     Difficulty of Paying Living Expenses: Not hard at all   Food Insecurity: No Food Insecurity (1/24/2025)    Hunger Vital Sign     Worried About Running Out of Food in the Last Year: Never true     Ran Out of Food in the Last Year: Never true   Transportation Needs: No Transportation Needs (3/22/2023)    PRAPARE - Transportation     Lack of Transportation (Medical): No     Lack of Transportation (Non-Medical): No   Physical Activity: Insufficiently Active (1/24/2025)    Exercise Vital Sign     Days of Exercise per Week: 3 days     Minutes of Exercise per Session: 20 min   Stress: No Stress Concern Present (1/24/2025)    [employer] - Occupational Stress Questionnaire     Feeling of Stress : Only a little   Housing Stability: Low Risk  (3/22/2023)    Housing Stability Vital Sign     Unable to Pay for Housing in the Last Year: No     Number of Places Lived in the Last Year: 1     Unstable Housing in the Last Year: No           Objective:     Vitals:    01/24/25 1259   BP: 135/86   Pulse: 87   Resp: 17   Temp: 97.7 °F (36.5 °C)        Physical Exam    General: No acute distress. Well-developed. Well-nourished.  Eyes: EOMI. Sclerae anicteric.  HENT: Normocephalic. Atraumatic. Nares patent. Moist oral mucosa.  Cardiovascular: Regular rate. Regular rhythm. No murmurs. No rubs. No gallops. Normal S1, S2.  Respiratory: Normal respiratory effort. Clear to auscultation bilaterally. No rales. No rhonchi. No wheezing.  Abdomen: Abdomen larger than it should be.  Musculoskeletal: No  obvious  "deformity.  Extremities: No lower extremity edema.  Neurological: Alert & oriented x3. No slurred speech. Normal gait.  Psychiatric: Normal mood. Normal affect. Good insight. Good judgment.  Skin: Warm. Dry. No rash. No skin lesions on exam of the head, neck, and back.            Assessment:       1. Preventative health care    2. Encounter for prostate cancer screening    3. Daytime somnolence    4. Mixed hyperlipidemia    5. Hyperuricemia    6. Seizures        Plan:       Assessment & Plan    IMPRESSION:  - Conducted annual well visit, including physical exam and review of systems  - Noted patient's abdomen larger than ideal  - Will check A1C for diabetes screening, despite previous normal results  - Opted for PSA blood test instead of physical prostate exam due to time constraints and absence of symptoms  - Considered preventive approach, focusing on weight management and dietary changes    ANNUAL WELL VISIT:  - Performed a comprehensive review of systems and physical exam for the patient's annual well visit.  - Noted an enlarged abdominal size during the physical exam.  - Will postpone physical exam due to time constraints and lack of symptoms.    WEIGHT MANAGEMENT:  - Discussed the importance of weight loss and potential dietary changes with the patient.  - Noted that the patient has switched to zero sugar drinks, having previously consumed large amounts of soda.  - Explained the benefits of a diabetic diet for weight management, even for non-diabetics.  - Discussed the importance of reducing sugar and carbohydrate intake for weight loss.  - Emphasized the significance of a team effort in maintaining a healthy diet at home.  - Recommend a diabetic diet with no added sugar and low amounts of starch.  - Implement diabetic diet: avoid added sugars, limit bread, rice, pasta, and potatoes.  - Mr. Ewing to continue drinking zero sugar beverages instead of soda.  - Mr. Ewing to engage family in "team effort" to " "support dietary changes.    DIABETES SCREENING:  - Ordered A1C test for diabetes screening.    PROSTATE SCREENING:  - Ordered PSA blood test for prostate screening.    LABS:  - Scheduled a follow-up visit in 1 week for fasting labs, which the patient plans to complete on Monday morning.  - Instructed the patient to return for fasting labs.        Iván King" was seen today for annual exam.    Diagnoses and all orders for this visit:    Preventative health care  -     Comprehensive Metabolic Panel; Future  -     Vitamin D; Future  -     Hepatitis C Antibody; Future  -     Hemoglobin A1C; Future    Encounter for prostate cancer screening  -     PSA, Screening; Future    Daytime somnolence  -     Home Sleep Study; Future  -     TSH; Future    Mixed hyperlipidemia  -     Lipid Panel; Future    Hyperuricemia  -     URIC ACID; Future    Seizures         No follow-ups on file.  This note was generated with the assistance of ambient listening technology. Verbal consent was obtained by the patient and accompanying visitor(s) for the recording of patient appointment to facilitate this note. I attest to having reviewed and edited the generated note for accuracy, though some syntax or spelling errors may persist. Please contact the author of this note for any clarification.   Subjective:       Patient ID: Iván Amaral is a 39 y.o. male.    Chief Complaint: Annual Exam      Patient is here for annual well visit also is wanting a thyroid evaluation because of some weight gain and sleep issues.  Wt Readings from Last 4 Encounters:  01/24/25 : 113 kg (249 lb 3.2 oz)  07/24/24 : 113.8 kg (250 lb 12.8 oz)  01/02/24 : 109.3 kg (241 lb)  03/22/23 : 109.3 kg (241 lb)            Allergies and Medications:   Review of patient's allergies indicates:   Allergen Reactions    Pcn [penicillins]      Current Outpatient Medications   Medication Sig Dispense Refill    allopurinoL (ZYLOPRIM) 300 MG tablet Take 1 tablet (300 mg total) " by mouth once daily. 90 tablet 3    atorvastatin (LIPITOR) 40 MG tablet Take 1 tablet (40 mg total) by mouth once daily. 90 tablet 1    colchicine (MITIGARE) 0.6 mg Cap Take 1 capsule (0.6 mg total) by mouth 2 (two) times a day. 60 capsule 2    EScitalopram oxalate (LEXAPRO) 10 MG tablet Take 1 tablet (10 mg total) by mouth once daily. 90 tablet 1    multivitamin (THERAGRAN) per tablet Take 1 tablet by mouth once daily.       No current facility-administered medications for this visit.       Family History:   Family History   Problem Relation Name Age of Onset    Diabetes Mother      Hypothyroidism Mother      Diabetes Father      Gout Father      Heart failure Father      Heart disease Father      Hypoglycemic Brother      Cancer Maternal Grandfather      Melanoma Maternal Grandfather         Social History:   Social History     Socioeconomic History    Marital status: Single   Tobacco Use    Smoking status: Never    Smokeless tobacco: Never   Substance and Sexual Activity    Alcohol use: No    Drug use: No    Sexual activity: Not Currently     Social Drivers of Health     Financial Resource Strain: Low Risk  (1/24/2025)    Overall Financial Resource Strain (CARDIA)     Difficulty of Paying Living Expenses: Not hard at all   Food Insecurity: No Food Insecurity (1/24/2025)    Hunger Vital Sign     Worried About Running Out of Food in the Last Year: Never true     Ran Out of Food in the Last Year: Never true   Transportation Needs: No Transportation Needs (3/22/2023)    PRAPARE - Transportation     Lack of Transportation (Medical): No     Lack of Transportation (Non-Medical): No   Physical Activity: Insufficiently Active (1/24/2025)    Exercise Vital Sign     Days of Exercise per Week: 3 days     Minutes of Exercise per Session: 20 min   Stress: No Stress Concern Present (1/24/2025)    [employer] - Occupational Stress Questionnaire     Feeling of Stress : Only a little   Housing  Stability: Low Risk  (3/22/2023)    Housing Stability Vital Sign     Unable to Pay for Housing in the Last Year: No     Number of Places Lived in the Last Year: 1     Unstable Housing in the Last Year: No       Review of Systems   Constitutional:  Negative for activity change, appetite change, chills, diaphoresis, fatigue, fever and unexpected weight change (Unchanged from previous).   HENT:  Negative for congestion, dental problem, drooling, ear discharge, ear pain, facial swelling, hearing loss, mouth sores, nosebleeds, postnasal drip, rhinorrhea, sinus pressure, sinus pain, sneezing, sore throat, tinnitus, trouble swallowing and voice change.    Eyes:  Negative for photophobia, pain, discharge, redness, itching and visual disturbance.   Respiratory:  Negative for apnea, cough, choking, chest tightness, shortness of breath, wheezing and stridor.    Cardiovascular:  Negative for chest pain, palpitations and leg swelling.   Gastrointestinal:  Negative for abdominal distention, abdominal pain (distant history of gout none since uric acid controlled), anal bleeding, blood in stool, constipation, diarrhea, nausea, rectal pain and vomiting.   Endocrine: Negative for cold intolerance, heat intolerance, polydipsia, polyphagia and polyuria.   Genitourinary:  Negative for decreased urine volume, difficulty urinating, dysuria, enuresis, flank pain, frequency, genital sores, hematuria, penile discharge, penile pain, penile swelling, scrotal swelling, testicular pain and urgency.   Musculoskeletal:  Negative for arthralgias, back pain, gait problem, joint swelling, myalgias, neck pain and neck stiffness.   Skin:  Negative for color change, pallor, rash and wound.   Allergic/Immunologic: Negative for environmental allergies, food allergies and immunocompromised state.   Neurological:  Negative for dizziness, tremors, seizures, syncope, facial asymmetry, speech difficulty, weakness, light-headedness, numbness and headaches.    Hematological:  Negative for adenopathy. Does not bruise/bleed easily.   Psychiatric/Behavioral:  Negative for agitation, behavioral problems, confusion, decreased concentration, dysphoric mood, hallucinations, self-injury, sleep disturbance and suicidal ideas. The patient is not nervous/anxious and is not hyperactive.        Objective:     Vitals:    01/24/25 1259   BP: 135/86   Pulse: 87   Resp: 17   Temp: 97.7 °F (36.5 °C)        Physical Exam  Vitals and nursing note reviewed.   Constitutional:       General: He is not in acute distress.     Appearance: He is well-developed. He is obese. He is not ill-appearing, toxic-appearing or diaphoretic.   HENT:      Head: Normocephalic and atraumatic.      Right Ear: Hearing and external ear normal. There is no impacted cerumen.      Left Ear: Hearing and external ear normal. There is no impacted cerumen.      Nose: Nose normal. No congestion or rhinorrhea.      Mouth/Throat:      Pharynx: No oropharyngeal exudate or posterior oropharyngeal erythema.   Eyes:      General: No scleral icterus.        Right eye: No discharge.         Left eye: No discharge.      Conjunctiva/sclera: Conjunctivae normal.      Pupils: Pupils are equal, round, and reactive to light.   Neck:      Thyroid: No thyromegaly.      Vascular: No carotid bruit or JVD.      Trachea: No tracheal deviation.   Cardiovascular:      Rate and Rhythm: Normal rate.      Heart sounds: Normal heart sounds. No murmur heard.     No friction rub. No gallop.   Pulmonary:      Effort: Pulmonary effort is normal. No respiratory distress.      Breath sounds: Normal breath sounds. No stridor. No wheezing, rhonchi or rales.   Chest:      Chest wall: No tenderness.   Abdominal:      General: Bowel sounds are normal. There is no distension.      Palpations: Abdomen is soft. There is no mass.      Tenderness: There is no abdominal tenderness. There is no right CVA tenderness, left CVA tenderness, guarding or rebound.       "Hernia: No hernia is present.   Genitourinary:     Penis: Circumcised. No phimosis, paraphimosis, hypospadias, erythema, tenderness or discharge.       Testes: Cremasteric reflex is present.         Right: Mass, tenderness or swelling not present. Right testis is descended. Cremasteric reflex is present.          Left: Mass, tenderness or swelling not present. Left testis is descended. Cremasteric reflex is present.    Musculoskeletal:         General: No swelling, tenderness, deformity or signs of injury.      Cervical back: Normal range of motion and neck supple. No rigidity or tenderness.      Right lower leg: No edema.      Left lower leg: No edema.   Lymphadenopathy:      Cervical: No cervical adenopathy.   Skin:     General: Skin is warm and dry.      Coloration: Skin is not jaundiced or pale.      Findings: No bruising, erythema, lesion or rash.   Neurological:      Mental Status: He is alert and oriented to person, place, and time.      Cranial Nerves: No cranial nerve deficit.      Sensory: No sensory deficit.      Motor: No weakness or abnormal muscle tone.      Coordination: Coordination normal.      Gait: Gait normal.      Deep Tendon Reflexes: Reflexes are normal and symmetric. Reflexes normal.   Psychiatric:         Behavior: Behavior normal.         Thought Content: Thought content normal.         Judgment: Judgment normal.         Assessment:       1. Preventative health care    2. Encounter for prostate cancer screening    3. Daytime somnolence    4. Mixed hyperlipidemia    5. Hyperuricemia    6. Seizures        Plan:       Iván King" was seen today for annual exam.    Diagnoses and all orders for this visit:    Preventative health care  -     Comprehensive Metabolic Panel; Future  -     Vitamin D; Future  -     Hepatitis C Antibody; Future  -     Hemoglobin A1C; Future    Encounter for prostate cancer screening  -     PSA, Screening; Future    Daytime somnolence  -     Home Sleep Study; " Future  -     TSH; Future    Mixed hyperlipidemia  -     Lipid Panel; Future    Hyperuricemia  -     URIC ACID; Future    Seizures         No follow-ups on file.

## 2025-01-28 ENCOUNTER — LAB VISIT (OUTPATIENT)
Dept: LAB | Facility: HOSPITAL | Age: 40
End: 2025-01-28
Attending: FAMILY MEDICINE
Payer: COMMERCIAL

## 2025-01-28 DIAGNOSIS — E79.0 HYPERURICEMIA: ICD-10-CM

## 2025-01-28 DIAGNOSIS — Z00.00 PREVENTATIVE HEALTH CARE: Chronic | ICD-10-CM

## 2025-01-28 DIAGNOSIS — E78.2 MIXED HYPERLIPIDEMIA: ICD-10-CM

## 2025-01-28 DIAGNOSIS — Z12.5 ENCOUNTER FOR PROSTATE CANCER SCREENING: ICD-10-CM

## 2025-01-28 DIAGNOSIS — R40.0 DAYTIME SOMNOLENCE: ICD-10-CM

## 2025-01-28 LAB
25(OH)D3+25(OH)D2 SERPL-MCNC: 10 NG/ML (ref 30–96)
ALBUMIN SERPL BCP-MCNC: 4.6 G/DL (ref 3.5–5.2)
ALP SERPL-CCNC: 141 U/L (ref 55–135)
ALT SERPL W/O P-5'-P-CCNC: 35 U/L (ref 10–44)
ANION GAP SERPL CALC-SCNC: 7 MMOL/L (ref 8–16)
AST SERPL-CCNC: 20 U/L (ref 10–40)
BILIRUB SERPL-MCNC: 0.6 MG/DL (ref 0.1–1)
BUN SERPL-MCNC: 10 MG/DL (ref 6–20)
CALCIUM SERPL-MCNC: 9.5 MG/DL (ref 8.7–10.5)
CHLORIDE SERPL-SCNC: 105 MMOL/L (ref 95–110)
CHOLEST SERPL-MCNC: 126 MG/DL (ref 120–199)
CHOLEST/HDLC SERPL: 3.9 {RATIO} (ref 2–5)
CO2 SERPL-SCNC: 25 MMOL/L (ref 23–29)
COMPLEXED PSA SERPL-MCNC: 0.33 NG/ML (ref 0–4)
CREAT SERPL-MCNC: 1 MG/DL (ref 0.5–1.4)
EST. GFR  (NO RACE VARIABLE): >60 ML/MIN/1.73 M^2
ESTIMATED AVG GLUCOSE: 117 MG/DL (ref 68–131)
GLUCOSE SERPL-MCNC: 99 MG/DL (ref 70–110)
HBA1C MFR BLD: 5.7 % (ref 4.5–6.2)
HCV AB SERPL QL IA: NEGATIVE
HDLC SERPL-MCNC: 32 MG/DL (ref 40–75)
HDLC SERPL: 25.4 % (ref 20–50)
LDLC SERPL CALC-MCNC: 64 MG/DL (ref 63–159)
NONHDLC SERPL-MCNC: 94 MG/DL
POTASSIUM SERPL-SCNC: 4.1 MMOL/L (ref 3.5–5.1)
PROT SERPL-MCNC: 7.3 G/DL (ref 6–8.4)
SODIUM SERPL-SCNC: 137 MMOL/L (ref 136–145)
TRIGL SERPL-MCNC: 150 MG/DL (ref 30–150)
TSH SERPL DL<=0.005 MIU/L-ACNC: 3.99 UIU/ML (ref 0.34–5.6)
URATE SERPL-MCNC: 5.5 MG/DL (ref 3.4–7)

## 2025-01-28 PROCEDURE — 83036 HEMOGLOBIN GLYCOSYLATED A1C: CPT | Performed by: FAMILY MEDICINE

## 2025-01-28 PROCEDURE — 82306 VITAMIN D 25 HYDROXY: CPT | Performed by: FAMILY MEDICINE

## 2025-01-28 PROCEDURE — 84153 ASSAY OF PSA TOTAL: CPT | Performed by: FAMILY MEDICINE

## 2025-01-28 PROCEDURE — 36415 COLL VENOUS BLD VENIPUNCTURE: CPT | Performed by: FAMILY MEDICINE

## 2025-01-28 PROCEDURE — 80053 COMPREHEN METABOLIC PANEL: CPT | Performed by: FAMILY MEDICINE

## 2025-01-28 PROCEDURE — 86803 HEPATITIS C AB TEST: CPT | Performed by: FAMILY MEDICINE

## 2025-01-28 PROCEDURE — 84550 ASSAY OF BLOOD/URIC ACID: CPT | Performed by: FAMILY MEDICINE

## 2025-01-28 PROCEDURE — 80061 LIPID PANEL: CPT | Performed by: FAMILY MEDICINE

## 2025-01-28 PROCEDURE — 84443 ASSAY THYROID STIM HORMONE: CPT | Performed by: FAMILY MEDICINE

## 2025-01-29 ENCOUNTER — PATIENT MESSAGE (OUTPATIENT)
Dept: FAMILY MEDICINE | Facility: CLINIC | Age: 40
End: 2025-01-29
Payer: COMMERCIAL

## 2025-01-29 DIAGNOSIS — E55.9 VITAMIN D DEFICIENCY: Primary | ICD-10-CM

## 2025-01-29 RX ORDER — ERGOCALCIFEROL 1.25 MG/1
50000 CAPSULE ORAL
Qty: 4 CAPSULE | Refills: 11 | Status: SHIPPED | OUTPATIENT
Start: 2025-01-29 | End: 2026-01-29

## 2025-01-29 NOTE — PROGRESS NOTES
Vitamin-D level is low.  We will initiate vitamin-D 68054 units per week, and recheck vitamin-D in 3 months.  I will send in orders.

## 2025-05-16 DIAGNOSIS — F41.8 DEPRESSION WITH ANXIETY: ICD-10-CM

## 2025-05-16 RX ORDER — ESCITALOPRAM OXALATE 10 MG/1
10 TABLET ORAL
Qty: 90 TABLET | Refills: 2 | Status: SHIPPED | OUTPATIENT
Start: 2025-05-16

## 2025-05-16 NOTE — TELEPHONE ENCOUNTER
No care due was identified.  [facility] Embedded Care Due Messages. Reference number: [id number].   5/16/2025 8:04:46 AM CDT

## 2025-05-17 NOTE — TELEPHONE ENCOUNTER
Refill Decision Note   Iván Amaral  is requesting a refill authorization.  Brief Assessment and Rationale for Refill:  Approve     Medication Therapy Plan:        Comments:     Note composed:11:18 PM 05/16/2025

## 2025-05-29 ENCOUNTER — TELEPHONE (OUTPATIENT)
Dept: FAMILY MEDICINE | Facility: CLINIC | Age: 40
End: 2025-05-29
Payer: COMMERCIAL

## 2025-08-03 ENCOUNTER — PATIENT MESSAGE (OUTPATIENT)
Dept: FAMILY MEDICINE | Facility: CLINIC | Age: 40
End: 2025-08-03
Payer: COMMERCIAL

## 2025-08-04 RX ORDER — CYCLOBENZAPRINE HCL 10 MG
10 TABLET ORAL NIGHTLY
Qty: 30 TABLET | Refills: 3 | Status: SHIPPED | OUTPATIENT
Start: 2025-08-04 | End: 2025-12-02

## 2025-08-04 NOTE — TELEPHONE ENCOUNTER
Patient Message    Hello,     Regarding the earlier problems I was having with muscle twitches and restless feeling in my back that were contributing to insomnia; you previously prescribed me Gabapentin which sadly did not work. A friend of mine had me try Flexeril, though, while we were on a trip and I was having a bad bout of insomnia, and it worked a treat. I was wondering if you would consider prescribing that for me.

## 2025-08-19 DIAGNOSIS — E79.0 HYPERURICEMIA: ICD-10-CM

## 2025-08-19 RX ORDER — ALLOPURINOL 300 MG/1
300 TABLET ORAL
Qty: 90 TABLET | Refills: 1 | OUTPATIENT
Start: 2025-08-19